# Patient Record
Sex: FEMALE | Race: BLACK OR AFRICAN AMERICAN | NOT HISPANIC OR LATINO | Employment: FULL TIME | URBAN - METROPOLITAN AREA
[De-identification: names, ages, dates, MRNs, and addresses within clinical notes are randomized per-mention and may not be internally consistent; named-entity substitution may affect disease eponyms.]

---

## 2017-01-03 ENCOUNTER — PATIENT MESSAGE (OUTPATIENT)
Dept: ENDOCRINOLOGY | Facility: CLINIC | Age: 52
End: 2017-01-03

## 2017-01-21 DIAGNOSIS — E03.9 HYPOTHYROIDISM: ICD-10-CM

## 2017-01-22 RX ORDER — LEVOTHYROXINE SODIUM 75 UG/1
TABLET ORAL
Qty: 30 TABLET | Refills: 11 | Status: SHIPPED | OUTPATIENT
Start: 2017-01-22 | End: 2017-01-23 | Stop reason: SDUPTHER

## 2017-01-23 DIAGNOSIS — E03.9 HYPOTHYROIDISM, UNSPECIFIED TYPE: ICD-10-CM

## 2017-01-23 RX ORDER — LEVOTHYROXINE SODIUM 75 UG/1
75 TABLET ORAL
Qty: 30 TABLET | Refills: 11 | Status: SHIPPED | OUTPATIENT
Start: 2017-01-23 | End: 2017-12-21 | Stop reason: SDUPTHER

## 2017-01-25 RX ORDER — POTASSIUM CHLORIDE 20 MEQ/1
TABLET, EXTENDED RELEASE ORAL
Qty: 30 TABLET | Refills: 5 | Status: SHIPPED | OUTPATIENT
Start: 2017-01-25 | End: 2017-07-20 | Stop reason: SDUPTHER

## 2017-01-31 DIAGNOSIS — I10 ESSENTIAL HYPERTENSION: ICD-10-CM

## 2017-01-31 DIAGNOSIS — E11.9 TYPE 2 DIABETES MELLITUS WITHOUT COMPLICATION: ICD-10-CM

## 2017-01-31 RX ORDER — TRIAMTERENE AND HYDROCHLOROTHIAZIDE 37.5; 25 MG/1; MG/1
1 CAPSULE ORAL DAILY
Qty: 30 CAPSULE | Refills: 5 | Status: SHIPPED | OUTPATIENT
Start: 2017-01-31 | End: 2017-07-20 | Stop reason: SDUPTHER

## 2017-02-01 RX ORDER — METFORMIN HYDROCHLORIDE 750 MG/1
TABLET, EXTENDED RELEASE ORAL
Qty: 90 TABLET | Refills: 0 | OUTPATIENT
Start: 2017-02-01

## 2017-02-01 RX ORDER — VERAPAMIL HYDROCHLORIDE 240 MG/1
TABLET, FILM COATED, EXTENDED RELEASE ORAL
Qty: 90 TABLET | Refills: 0 | OUTPATIENT
Start: 2017-02-01

## 2017-04-13 ENCOUNTER — OFFICE VISIT (OUTPATIENT)
Dept: OBSTETRICS AND GYNECOLOGY | Facility: CLINIC | Age: 52
End: 2017-04-13
Payer: COMMERCIAL

## 2017-04-13 VITALS
BODY MASS INDEX: 43 KG/M2 | HEIGHT: 61 IN | SYSTOLIC BLOOD PRESSURE: 106 MMHG | DIASTOLIC BLOOD PRESSURE: 70 MMHG | WEIGHT: 227.75 LBS

## 2017-04-13 DIAGNOSIS — Z01.419 ROUTINE GYNECOLOGICAL EXAMINATION: Primary | ICD-10-CM

## 2017-04-13 DIAGNOSIS — Z12.31 SCREENING MAMMOGRAM, ENCOUNTER FOR: ICD-10-CM

## 2017-04-13 DIAGNOSIS — Z12.4 SCREENING FOR MALIGNANT NEOPLASM OF THE CERVIX: ICD-10-CM

## 2017-04-13 PROCEDURE — 88175 CYTOPATH C/V AUTO FLUID REDO: CPT

## 2017-04-13 PROCEDURE — 99396 PREV VISIT EST AGE 40-64: CPT | Mod: S$GLB,,, | Performed by: OBSTETRICS & GYNECOLOGY

## 2017-04-13 PROCEDURE — 3078F DIAST BP <80 MM HG: CPT | Mod: S$GLB,,, | Performed by: OBSTETRICS & GYNECOLOGY

## 2017-04-13 PROCEDURE — 3074F SYST BP LT 130 MM HG: CPT | Mod: S$GLB,,, | Performed by: OBSTETRICS & GYNECOLOGY

## 2017-04-13 PROCEDURE — 99999 PR PBB SHADOW E&M-EST. PATIENT-LVL III: CPT | Mod: PBBFAC,,, | Performed by: OBSTETRICS & GYNECOLOGY

## 2017-04-13 NOTE — PROGRESS NOTES
"OBSTETRIC HISTORY:   OB History      Para Term  AB TAB SAB Ectopic Multiple Living    3 3        3         COMPREHENSIVE GYN HISTORY:  PAP History: Denies abnormal Paps.  Infection History: Denies STDs. Denies PID.  Benign History: Denies uterine fibroids. Denies ovarian cysts. Denies endometriosis.   Cancer History: Denies cervical cancer. Denies uterine cancer or hyperplasia. Denies ovarian cancer. Denies vulvar cancer or pre-cancer. Denies vaginal cancer or pre-cancer. Denies breast cancer. Denies colon cancer.  Sexual Activity History:   reports that she currently engages in sexual activity. She reports using the following method of birth control/protection: Surgical.   Menstrual History: Every 30 days, flows for 5 days. Heavy (changes pad and tampon every 1-2 hours)  flow.  Dysmenorrhea History: Denies dysmenorrhea.      HPI:   51 y.o.  Patient's last menstrual period was 2014.   Patient is  here for her annual gynecologic exam.  She has complaints of nipple itching and bilateral sharp breast pain that comes and goes. Requesting something more for vaginal dryness (OTC vaginal moisturizers not helping). She denies bladder and bowel complaints.    ROS:  GENERAL: Denies weight gain or weight loss. Feeling well overall.   SKIN: Denies rash or lesions.   HEAD: Denies headache.   NODES: Denies enlarged lymph nodes.   CHEST: Denies shortness of breath.   ABDOMEN: No abdominal pain, constipation, diarrhea, nausea, vomiting or rectal bleeding.   URINARY: No frequency, dysuria, hematuria, or burning on urination.  REPRODUCTIVE: See HPI.   BREASTS: The patient denies lumps, or nipple discharge.   HEMATOLOGIC: No easy bruisability.   MUSCULOSKELETAL: Denies joint pain or back pain.   NEUROLOGIC: Denies weakness.   PSYCHIATRIC: Denies depression, anxiety or mood swings.    PE:   /70  Ht 5' 1" (1.549 m)  Wt 103.3 kg (227 lb 11.8 oz)  LMP 2014  BMI 43.03 kg/m2  APPEARANCE: Well nourished, " well developed, in no acute distress.  NECK: Neck symmetric without  thyromegaly.  NODES: No inguinal, cervical lymph node enlargement.  CHEST: Lungs clear to auscultation.  HEART: Regular rate and rhythm, no murmurs, rubs or gallops.  ABDOMEN: Soft. No tenderness or masses. No hernias.  BREASTS: Symmetrical, no skin changes or visible lesions other than well healed scars. No palpable masses but breasts are very dense and difficult to examine. No nipple discharge or adenopathy bilaterally.  PELVIC:   VULVA: No lesions. Normal female genitalia.  URETHRAL MEATUS: Normal size and location, no lesions, no prolapse.  URETHRA: No masses, tenderness, prolapse or scarring.  VAGINA: Moist and well rugated, no discharge, no significant cystocele or rectocele.  CERVIX: No lesions and discharge.  UTERUS: Normal size, regular shape, mobile, non-tender, bladder base nontender.  ADNEXA: No masses or tenderness.    PROCEDURES:  Pap smear    Assessment:  Normal Gynecologic Exam  Breast pain  Nipple itching  Atrophy/dyspareunia-- Premarin Cream    Plan:  Mammogram and Colonoscopy if indicated by current recommendations.  Return to clinic in one year or for any problems or complaints.    Counseling:  Patient was counseled today on A.C.S. Pap guidelines and recommendations for yearly pelvic exams and monthly self breast exams; to see her PCP for other health maintenance. Regular exercise and healthy diet.

## 2017-05-15 ENCOUNTER — TELEPHONE (OUTPATIENT)
Dept: INTERNAL MEDICINE | Facility: CLINIC | Age: 52
End: 2017-05-15

## 2017-05-15 DIAGNOSIS — Z00.00 ROUTINE MEDICAL EXAM: Primary | ICD-10-CM

## 2017-05-15 DIAGNOSIS — E11.9 TYPE 2 DIABETES MELLITUS WITHOUT COMPLICATION, WITHOUT LONG-TERM CURRENT USE OF INSULIN: ICD-10-CM

## 2017-05-15 NOTE — TELEPHONE ENCOUNTER
----- Message from Ange Melvin sent at 5/15/2017 10:02 AM CDT -----  Doctor appointment and lab have been scheduled.  Please link lab orders to the lab appointment.  Date of doctor appointment:  7/20  Physical or EP:  epp  Date of lab appointment:  7/13  Comments:

## 2017-06-15 DIAGNOSIS — I10 ESSENTIAL HYPERTENSION: ICD-10-CM

## 2017-06-15 DIAGNOSIS — E11.9 TYPE 2 DIABETES MELLITUS WITHOUT COMPLICATION: ICD-10-CM

## 2017-06-15 RX ORDER — METFORMIN HYDROCHLORIDE 750 MG/1
TABLET, EXTENDED RELEASE ORAL
Qty: 30 TABLET | Refills: 1 | Status: SHIPPED | OUTPATIENT
Start: 2017-06-15 | End: 2017-12-21

## 2017-06-15 RX ORDER — VERAPAMIL HYDROCHLORIDE 240 MG/1
TABLET, FILM COATED, EXTENDED RELEASE ORAL
Qty: 30 TABLET | Refills: 1 | Status: SHIPPED | OUTPATIENT
Start: 2017-06-15 | End: 2017-09-18 | Stop reason: SDUPTHER

## 2017-06-28 DIAGNOSIS — Z12.11 COLON CANCER SCREENING: ICD-10-CM

## 2017-07-13 ENCOUNTER — LAB VISIT (OUTPATIENT)
Dept: LAB | Facility: HOSPITAL | Age: 52
End: 2017-07-13
Payer: COMMERCIAL

## 2017-07-13 DIAGNOSIS — E11.9 TYPE 2 DIABETES MELLITUS WITHOUT COMPLICATION, WITHOUT LONG-TERM CURRENT USE OF INSULIN: ICD-10-CM

## 2017-07-13 DIAGNOSIS — Z00.00 ROUTINE MEDICAL EXAM: ICD-10-CM

## 2017-07-13 LAB
25(OH)D3+25(OH)D2 SERPL-MCNC: 62 NG/ML
ALBUMIN SERPL BCP-MCNC: 3.4 G/DL
ALP SERPL-CCNC: 78 U/L
ALT SERPL W/O P-5'-P-CCNC: 11 U/L
ANION GAP SERPL CALC-SCNC: 8 MMOL/L
AST SERPL-CCNC: 20 U/L
BASOPHILS # BLD AUTO: 0.04 K/UL
BASOPHILS NFR BLD: 0.5 %
BILIRUB SERPL-MCNC: 0.3 MG/DL
BUN SERPL-MCNC: 15 MG/DL
CALCIUM SERPL-MCNC: 9.3 MG/DL
CHLORIDE SERPL-SCNC: 108 MMOL/L
CHOLEST/HDLC SERPL: 5.6 {RATIO}
CO2 SERPL-SCNC: 28 MMOL/L
CREAT SERPL-MCNC: 0.9 MG/DL
DIFFERENTIAL METHOD: ABNORMAL
EOSINOPHIL # BLD AUTO: 0.2 K/UL
EOSINOPHIL NFR BLD: 2.9 %
ERYTHROCYTE [DISTWIDTH] IN BLOOD BY AUTOMATED COUNT: 13.6 %
EST. GFR  (AFRICAN AMERICAN): >60 ML/MIN/1.73 M^2
EST. GFR  (NON AFRICAN AMERICAN): >60 ML/MIN/1.73 M^2
ESTIMATED AVG GLUCOSE: 114 MG/DL
GLUCOSE SERPL-MCNC: 93 MG/DL
HBA1C MFR BLD HPLC: 5.6 %
HCT VFR BLD AUTO: 41.2 %
HDL/CHOLESTEROL RATIO: 17.8 %
HDLC SERPL-MCNC: 185 MG/DL
HDLC SERPL-MCNC: 33 MG/DL
HGB BLD-MCNC: 13.9 G/DL
LDLC SERPL CALC-MCNC: 124.2 MG/DL
LYMPHOCYTES # BLD AUTO: 4.8 K/UL
LYMPHOCYTES NFR BLD: 64.7 %
MCH RBC QN AUTO: 29.9 PG
MCHC RBC AUTO-ENTMCNC: 33.7 %
MCV RBC AUTO: 89 FL
MONOCYTES # BLD AUTO: 0.4 K/UL
MONOCYTES NFR BLD: 4.7 %
NEUTROPHILS # BLD AUTO: 2 K/UL
NEUTROPHILS NFR BLD: 27.1 %
NONHDLC SERPL-MCNC: 152 MG/DL
PLATELET # BLD AUTO: 265 K/UL
PMV BLD AUTO: 10.1 FL
POTASSIUM SERPL-SCNC: 3.6 MMOL/L
PROT SERPL-MCNC: 6.6 G/DL
RBC # BLD AUTO: 4.65 M/UL
SODIUM SERPL-SCNC: 144 MMOL/L
TRIGL SERPL-MCNC: 139 MG/DL
WBC # BLD AUTO: 7.47 K/UL

## 2017-07-13 PROCEDURE — 85025 COMPLETE CBC W/AUTO DIFF WBC: CPT

## 2017-07-13 PROCEDURE — 83036 HEMOGLOBIN GLYCOSYLATED A1C: CPT

## 2017-07-13 PROCEDURE — 36415 COLL VENOUS BLD VENIPUNCTURE: CPT

## 2017-07-13 PROCEDURE — 80053 COMPREHEN METABOLIC PANEL: CPT

## 2017-07-13 PROCEDURE — 82306 VITAMIN D 25 HYDROXY: CPT

## 2017-07-13 PROCEDURE — 80061 LIPID PANEL: CPT

## 2017-07-20 ENCOUNTER — OFFICE VISIT (OUTPATIENT)
Dept: INTERNAL MEDICINE | Facility: CLINIC | Age: 52
End: 2017-07-20
Payer: COMMERCIAL

## 2017-07-20 VITALS
HEART RATE: 61 BPM | HEIGHT: 61 IN | WEIGHT: 227.13 LBS | BODY MASS INDEX: 42.88 KG/M2 | SYSTOLIC BLOOD PRESSURE: 118 MMHG | DIASTOLIC BLOOD PRESSURE: 82 MMHG

## 2017-07-20 DIAGNOSIS — E11.9 TYPE 2 DIABETES MELLITUS WITHOUT COMPLICATION, WITHOUT LONG-TERM CURRENT USE OF INSULIN: ICD-10-CM

## 2017-07-20 DIAGNOSIS — Z00.00 ROUTINE MEDICAL EXAM: Primary | ICD-10-CM

## 2017-07-20 DIAGNOSIS — E11.69 DYSLIPIDEMIA ASSOCIATED WITH TYPE 2 DIABETES MELLITUS: ICD-10-CM

## 2017-07-20 DIAGNOSIS — E66.01 MORBID OBESITY WITH BMI OF 40.0-44.9, ADULT: ICD-10-CM

## 2017-07-20 DIAGNOSIS — Z12.11 COLON CANCER SCREENING: ICD-10-CM

## 2017-07-20 DIAGNOSIS — E78.5 DYSLIPIDEMIA ASSOCIATED WITH TYPE 2 DIABETES MELLITUS: ICD-10-CM

## 2017-07-20 DIAGNOSIS — I10 ESSENTIAL HYPERTENSION: ICD-10-CM

## 2017-07-20 DIAGNOSIS — Z12.31 ENCOUNTER FOR SCREENING MAMMOGRAM FOR BREAST CANCER: ICD-10-CM

## 2017-07-20 LAB
BILIRUB UR QL STRIP: NEGATIVE
CLARITY UR REFRACT.AUTO: CLEAR
COLOR UR AUTO: YELLOW
CREAT UR-MCNC: 97 MG/DL
GLUCOSE UR QL STRIP: NEGATIVE
HGB UR QL STRIP: NEGATIVE
KETONES UR QL STRIP: NEGATIVE
LEUKOCYTE ESTERASE UR QL STRIP: NEGATIVE
MICROALBUMIN UR DL<=1MG/L-MCNC: <2.5 UG/ML
MICROALBUMIN/CREATININE RATIO: NORMAL UG/MG
NITRITE UR QL STRIP: NEGATIVE
PH UR STRIP: 6 [PH] (ref 5–8)
PROT UR QL STRIP: NEGATIVE
SP GR UR STRIP: 1.01 (ref 1–1.03)
URN SPEC COLLECT METH UR: NORMAL
UROBILINOGEN UR STRIP-ACNC: NEGATIVE EU/DL

## 2017-07-20 PROCEDURE — 99999 PR PBB SHADOW E&M-EST. PATIENT-LVL III: CPT | Mod: PBBFAC,,, | Performed by: INTERNAL MEDICINE

## 2017-07-20 PROCEDURE — 99396 PREV VISIT EST AGE 40-64: CPT | Mod: S$GLB,,, | Performed by: INTERNAL MEDICINE

## 2017-07-20 PROCEDURE — 82570 ASSAY OF URINE CREATININE: CPT

## 2017-07-20 PROCEDURE — 81003 URINALYSIS AUTO W/O SCOPE: CPT

## 2017-07-20 RX ORDER — TRIAMTERENE AND HYDROCHLOROTHIAZIDE 37.5; 25 MG/1; MG/1
1 CAPSULE ORAL DAILY
Qty: 30 CAPSULE | Refills: 5 | Status: SHIPPED | OUTPATIENT
Start: 2017-07-20 | End: 2018-02-04 | Stop reason: SDUPTHER

## 2017-07-20 RX ORDER — POTASSIUM CHLORIDE 20 MEQ/1
TABLET, EXTENDED RELEASE ORAL
Qty: 30 TABLET | Refills: 5 | Status: SHIPPED | OUTPATIENT
Start: 2017-07-20 | End: 2017-12-21

## 2017-07-20 NOTE — PROGRESS NOTES
Subjective:       Patient ID: Matt Lomax is a 51 y.o. female.    Chief Complaint: Annual Exam    Last seen 10 months ago - Hypertension and Diabetes were well controlled, but LDL Cholesterol was above goal at 124 - started Pravastatin 10mg. Returns for annual exam and f/u chronic conditions non-compliant with Pravastatin - never took it. In fact, she is hoping to get off her diabetes and blood pressure meds too. Has changed her diet and started exercising a few weeks ago, weight down about 6 pounds from last Fall. Has home BP and glucose monitors but not using them. I advised regular monitoring is crucial if she plans on stopping medication, and she agrees to do this. No new complaints.     Past medical history:     Hypertension.  Pituitary adenoma, with secondary hypothyroidism and secondary adrenal insufficiency.    Morbid Obesity..  Dyslipidemia, TChol 198, , HDL 32,  Sep. '16.   Diabetes Type 2, HbA1c 5.8% Sep. '16.   JUAN on CPAP.   Vitamin D insufficiency    Mammogram normal 3/16 - ordered this year, not done yet. Pelvic exam . BMD ordered, not done. Eye exam 10/16. Podiatry 10/16. Tdap . Flu shot and Pneumovax .       Negative treadmill stress test in 2008. Echocardiogram 2009 showed normal LV function with EF 60%, diastolic dysfunction and mild pulmonary hypertension.     Past Surgical History:  x 1. Transphenoidal Resection of Pituitary Adenoma .    Social History: Nonsmoker, no alcohol consumption,  with 3 children. Drives a school bus.    Family medical history: Mother  age 46 with an MI, and was a diabetic. No family history of cancer.    Allergies: ACE inhibitors - angioedema. Amlodipine - swelling. Clonidine - sedation.    Medications: list reviewed and reconciled.                 Review of Systems   Constitutional: Negative for activity change, appetite change, fatigue, fever and unexpected weight change.   HENT: Negative for  "congestion, hearing loss, rhinorrhea, sneezing, sore throat, trouble swallowing and voice change.    Eyes: Negative for pain, redness and visual disturbance.   Respiratory: Negative for cough, chest tightness, shortness of breath and wheezing.    Cardiovascular: Negative for chest pain, palpitations and leg swelling.   Gastrointestinal: Negative for abdominal pain, blood in stool, constipation, diarrhea, nausea and vomiting.   Genitourinary: Negative for difficulty urinating, dysuria, flank pain, frequency, hematuria, menstrual problem and urgency.   Musculoskeletal: Negative for arthralgias, back pain, joint swelling, myalgias and neck pain.   Skin: Negative for color change and rash.   Neurological: Negative for dizziness, syncope, facial asymmetry, speech difficulty, weakness, numbness and headaches.   Hematological: Negative for adenopathy. Does not bruise/bleed easily.   Psychiatric/Behavioral: Negative for decreased concentration, dysphoric mood and sleep disturbance. The patient is not nervous/anxious.        Objective:    /82, Pulse 61, Ht 5' 1", Wt 227 lbs, BMI=43  Physical Exam   Constitutional: She is oriented to person, place, and time. She appears well-developed and well-nourished. No distress.   HENT:   Head: Normocephalic and atraumatic.   Right Ear: External ear normal.   Left Ear: External ear normal.   Nose: Nose normal.   Mouth/Throat: Oropharynx is clear and moist. No oropharyngeal exudate.   Eyes: Conjunctivae and EOM are normal. Pupils are equal, round, and reactive to light. Right conjunctiva is not injected. Left conjunctiva is not injected. No scleral icterus.   Neck: Normal range of motion. Neck supple. No JVD present. Carotid bruit is not present. No thyromegaly present.   Cardiovascular: Normal rate, regular rhythm, normal heart sounds and intact distal pulses.  Exam reveals no gallop and no friction rub.    No murmur heard.  Pulmonary/Chest: Effort normal and breath sounds normal. " No respiratory distress. She has no wheezes. She has no rhonchi. She has no rales.   Abdominal: Soft. Bowel sounds are normal. She exhibits no distension and no mass. There is no hepatosplenomegaly. There is no tenderness. There is no CVA tenderness.   Musculoskeletal: Normal range of motion. She exhibits no edema, tenderness or deformity.   Lymphadenopathy:     She has no cervical adenopathy.   Neurological: She is alert and oriented to person, place, and time. She has normal strength. No cranial nerve deficit. She exhibits normal muscle tone. Coordination and gait normal.   Skin: Skin is warm and dry. No lesion and no rash noted. She is not diaphoretic. No cyanosis or erythema. No pallor. Nails show no clubbing.   Psychiatric: She has a normal mood and affect. Her behavior is normal. Judgment and thought content normal.   Vitals reviewed.      7/13/17: CBC normal, CMP normal, HbA1c 5.6%, Vit D 62, TChol 185, , HDL 33, .     Assessment:       1. Routine medical exam        Plan:       Routine medical exam  -     Urinalysis    Type 2 diabetes mellitus without complication, without long-term current use of insulin  -     Microalbumin/creatinine urine ratio  -     May hold Metformin, monitor fasting and evening glucose - parameters given to resume treatment.    Essential hypertension well controlled        -     May hold Verapamil, begin home monitoring - parameters given to resume treatment.   -     triamterene-hydrochlorothiazide 37.5-25 mg (DYAZIDE) 37.5-25 mg per capsule; Take 1 capsule by mouth once daily.  Dispense: 30 capsule; Refill: 5  -     potassium chloride SA (K-DUR,KLOR-CON) 20 MEQ tablet; TAKE 1 TABLET (20 MEQ TOTAL) BY MOUTH ONCE DAILY.  Dispense: 30 tablet; Refill: 5    Dyslipidemia associated with type 2 diabetes mellitus - counseled on diet and exercise, she declines medication.    Morbid obesity with BMI of 40.0-44.9, adult - continue diet and exercise.     Encounter for screening  mammogram for breast cancer - MMG ordered by Gyn is scheduled.    Colon cancer screening  -     Case request GI: COLONOSCOPY    Eye exam in October. Flu shot when available.

## 2017-07-28 ENCOUNTER — HOSPITAL ENCOUNTER (OUTPATIENT)
Dept: RADIOLOGY | Facility: HOSPITAL | Age: 52
Discharge: HOME | End: 2017-07-28
Attending: OBSTETRICS & GYNECOLOGY
Payer: COMMERCIAL

## 2017-07-28 DIAGNOSIS — Z12.31 SCREENING MAMMOGRAM, ENCOUNTER FOR: ICD-10-CM

## 2017-07-28 PROCEDURE — 77067 SCR MAMMO BI INCL CAD: CPT | Mod: 26,,, | Performed by: RADIOLOGY

## 2017-07-28 PROCEDURE — 77067 SCR MAMMO BI INCL CAD: CPT | Mod: TC

## 2017-07-28 PROCEDURE — 77063 BREAST TOMOSYNTHESIS BI: CPT | Mod: 26,,, | Performed by: RADIOLOGY

## 2017-07-31 DIAGNOSIS — Z12.11 SPECIAL SCREENING FOR MALIGNANT NEOPLASMS, COLON: Primary | ICD-10-CM

## 2017-07-31 RX ORDER — POLYETHYLENE GLYCOL 3350, SODIUM SULFATE ANHYDROUS, SODIUM BICARBONATE, SODIUM CHLORIDE, POTASSIUM CHLORIDE 236; 22.74; 6.74; 5.86; 2.97 G/4L; G/4L; G/4L; G/4L; G/4L
4 POWDER, FOR SOLUTION ORAL ONCE
Qty: 4000 ML | Refills: 0 | Status: SHIPPED | OUTPATIENT
Start: 2017-07-31 | End: 2017-07-31

## 2017-08-07 ENCOUNTER — PATIENT MESSAGE (OUTPATIENT)
Dept: INTERNAL MEDICINE | Facility: CLINIC | Age: 52
End: 2017-08-07

## 2017-08-07 ENCOUNTER — PATIENT MESSAGE (OUTPATIENT)
Dept: ENDOCRINOLOGY | Facility: CLINIC | Age: 52
End: 2017-08-07

## 2017-08-07 DIAGNOSIS — D35.2 PITUITARY MACROADENOMA: ICD-10-CM

## 2017-08-07 DIAGNOSIS — E03.8 SECONDARY HYPOTHYROIDISM: Primary | ICD-10-CM

## 2017-08-07 DIAGNOSIS — E11.9 TYPE 2 DIABETES MELLITUS WITHOUT COMPLICATION, WITHOUT LONG-TERM CURRENT USE OF INSULIN: Primary | ICD-10-CM

## 2017-08-07 DIAGNOSIS — E11.9 TYPE 2 DIABETES MELLITUS WITHOUT COMPLICATION, WITHOUT LONG-TERM CURRENT USE OF INSULIN: ICD-10-CM

## 2017-08-07 RX ORDER — LANCETS
1 EACH MISCELLANEOUS DAILY
Qty: 100 EACH | Refills: 3 | Status: SHIPPED | OUTPATIENT
Start: 2017-08-07 | End: 2017-10-20

## 2017-08-08 ENCOUNTER — PATIENT MESSAGE (OUTPATIENT)
Dept: ENDOCRINOLOGY | Facility: CLINIC | Age: 52
End: 2017-08-08

## 2017-08-09 ENCOUNTER — ANESTHESIA (OUTPATIENT)
Dept: ENDOSCOPY | Facility: HOSPITAL | Age: 52
End: 2017-08-09
Payer: COMMERCIAL

## 2017-08-09 ENCOUNTER — SURGERY (OUTPATIENT)
Age: 52
End: 2017-08-09

## 2017-08-09 ENCOUNTER — ANESTHESIA EVENT (OUTPATIENT)
Dept: ENDOSCOPY | Facility: HOSPITAL | Age: 52
End: 2017-08-09
Payer: COMMERCIAL

## 2017-08-09 ENCOUNTER — HOSPITAL ENCOUNTER (OUTPATIENT)
Facility: HOSPITAL | Age: 52
Discharge: HOME | End: 2017-08-09
Attending: INTERNAL MEDICINE | Admitting: INTERNAL MEDICINE
Payer: COMMERCIAL

## 2017-08-09 VITALS
TEMPERATURE: 98 F | RESPIRATION RATE: 17 BRPM | WEIGHT: 224 LBS | HEART RATE: 67 BPM | BODY MASS INDEX: 42.29 KG/M2 | DIASTOLIC BLOOD PRESSURE: 77 MMHG | SYSTOLIC BLOOD PRESSURE: 119 MMHG | OXYGEN SATURATION: 97 % | HEIGHT: 61 IN

## 2017-08-09 VITALS — RESPIRATION RATE: 16 BRPM

## 2017-08-09 DIAGNOSIS — Z12.11 COLON CANCER SCREENING: Primary | ICD-10-CM

## 2017-08-09 DIAGNOSIS — Z12.11 SCREENING FOR COLON CANCER: ICD-10-CM

## 2017-08-09 LAB — POCT GLUCOSE: 91 MG/DL (ref 70–110)

## 2017-08-09 PROCEDURE — 45385 COLONOSCOPY W/LESION REMOVAL: CPT | Performed by: INTERNAL MEDICINE

## 2017-08-09 PROCEDURE — D9220A PRA ANESTHESIA: Mod: 33,ANES,, | Performed by: ANESTHESIOLOGY

## 2017-08-09 PROCEDURE — 63600175 PHARM REV CODE 636 W HCPCS: Performed by: NURSE ANESTHETIST, CERTIFIED REGISTERED

## 2017-08-09 PROCEDURE — 82962 GLUCOSE BLOOD TEST: CPT | Performed by: INTERNAL MEDICINE

## 2017-08-09 PROCEDURE — D9220A PRA ANESTHESIA: Mod: 33,CRNA,, | Performed by: NURSE ANESTHETIST, CERTIFIED REGISTERED

## 2017-08-09 PROCEDURE — 88305 TISSUE EXAM BY PATHOLOGIST: CPT | Performed by: PATHOLOGY

## 2017-08-09 PROCEDURE — 25000003 PHARM REV CODE 250: Performed by: INTERNAL MEDICINE

## 2017-08-09 PROCEDURE — 88305 TISSUE EXAM BY PATHOLOGIST: CPT | Mod: 26,,, | Performed by: PATHOLOGY

## 2017-08-09 PROCEDURE — 37000009 HC ANESTHESIA EA ADD 15 MINS: Performed by: ANESTHESIOLOGY

## 2017-08-09 PROCEDURE — 25000003 PHARM REV CODE 250: Performed by: NURSE ANESTHETIST, CERTIFIED REGISTERED

## 2017-08-09 PROCEDURE — 37000008 HC ANESTHESIA 1ST 15 MINUTES: Performed by: INTERNAL MEDICINE

## 2017-08-09 PROCEDURE — 27201089 HC SNARE, DISP (ANY): Performed by: INTERNAL MEDICINE

## 2017-08-09 PROCEDURE — 37000008 HC ANESTHESIA 1ST 15 MINUTES: Performed by: ANESTHESIOLOGY

## 2017-08-09 PROCEDURE — 37000009 HC ANESTHESIA EA ADD 15 MINS: Performed by: INTERNAL MEDICINE

## 2017-08-09 PROCEDURE — 45385 COLONOSCOPY W/LESION REMOVAL: CPT | Mod: 33,,, | Performed by: INTERNAL MEDICINE

## 2017-08-09 RX ORDER — SODIUM CHLORIDE 9 MG/ML
INJECTION, SOLUTION INTRAVENOUS CONTINUOUS
Status: DISCONTINUED | OUTPATIENT
Start: 2017-08-09 | End: 2017-08-09 | Stop reason: HOSPADM

## 2017-08-09 RX ORDER — GLYCOPYRROLATE 0.2 MG/ML
INJECTION INTRAMUSCULAR; INTRAVENOUS
Status: DISCONTINUED | OUTPATIENT
Start: 2017-08-09 | End: 2017-08-09

## 2017-08-09 RX ORDER — PROPOFOL 10 MG/ML
INJECTION, EMULSION INTRAVENOUS
Status: DISCONTINUED | OUTPATIENT
Start: 2017-08-09 | End: 2017-08-09

## 2017-08-09 RX ORDER — PROPOFOL 10 MG/ML
INJECTION, EMULSION INTRAVENOUS CONTINUOUS PRN
Status: DISCONTINUED | OUTPATIENT
Start: 2017-08-09 | End: 2017-08-09

## 2017-08-09 RX ORDER — LIDOCAINE HCL/PF 100 MG/5ML
SYRINGE (ML) INTRAVENOUS
Status: DISCONTINUED | OUTPATIENT
Start: 2017-08-09 | End: 2017-08-09

## 2017-08-09 RX ADMIN — SODIUM CHLORIDE: 0.9 INJECTION, SOLUTION INTRAVENOUS at 09:08

## 2017-08-09 RX ADMIN — PROPOFOL 200 MCG/KG/MIN: 10 INJECTION, EMULSION INTRAVENOUS at 10:08

## 2017-08-09 RX ADMIN — PROPOFOL 70 MG: 10 INJECTION, EMULSION INTRAVENOUS at 10:08

## 2017-08-09 RX ADMIN — LIDOCAINE HYDROCHLORIDE 100 MG: 20 INJECTION, SOLUTION INTRAVENOUS at 10:08

## 2017-08-09 RX ADMIN — GLYCOPYRROLATE 0.2 MG: 0.2 INJECTION, SOLUTION INTRAMUSCULAR; INTRAVENOUS at 10:08

## 2017-08-09 NOTE — ANESTHESIA POSTPROCEDURE EVALUATION
"Anesthesia Post Evaluation    Patient: Matt Lomax    Procedure(s) Performed: Procedure(s) (LRB):  COLONOSCOPY (N/A)    Final Anesthesia Type: general  Patient location during evaluation: GI PACU  Patient participation: Yes- Able to Participate  Level of consciousness: awake and alert, oriented and awake  Post-procedure vital signs: reviewed and stable  Pain management: adequate  Airway patency: patent  PONV status at discharge: No PONV  Anesthetic complications: no      Cardiovascular status: blood pressure returned to baseline and hemodynamically stable  Respiratory status: unassisted, spontaneous ventilation and room air  Hydration status: euvolemic  Follow-up not needed.        Visit Vitals  /77 (BP Location: Left arm, Patient Position: Sitting, BP Method: Automatic)   Pulse 67   Temp 36.6 °C (97.8 °F) (Oral)   Resp 17   Ht 5' 1" (1.549 m)   Wt 101.6 kg (224 lb)   LMP 07/21/2014   SpO2 97%   Breastfeeding? No   BMI 42.32 kg/m²       Pain/Tye Score: Pain Assessment Performed: Yes (8/9/2017 11:15 AM)  Presence of Pain: denies (8/9/2017 11:15 AM)  Pain Rating Prior to Med Admin: 0 (8/9/2017 11:15 AM)  Tye Score: 10 (8/9/2017 11:15 AM)      "

## 2017-08-09 NOTE — TRANSFER OF CARE
"Anesthesia Transfer of Care Note    Patient: Matt Lomax    Procedure(s) Performed: Procedure(s) (LRB):  COLONOSCOPY (N/A)    Patient location: GI    Anesthesia Type: general    Transport from OR: Transported from OR on room air with adequate spontaneous ventilation    Post pain: adequate analgesia    Post assessment: no apparent anesthetic complications and tolerated procedure well    Post vital signs: stable    Level of consciousness: awake, alert and oriented    Nausea/Vomiting: no nausea/vomiting    Complications: none    Transfer of care protocol was followed      Last vitals:   Visit Vitals  /84 (Patient Position: Lying, BP Method: Automatic)   Pulse 71   Temp 37.1 °C (98.8 °F) (Oral)   Resp 16   Ht 5' 1" (1.549 m)   Wt 101.6 kg (224 lb)   LMP 07/21/2014   SpO2 97%   Breastfeeding? No   BMI 42.32 kg/m²     "

## 2017-08-09 NOTE — PATIENT INSTRUCTIONS
Discharge Summary/Instructions after an Endoscopic Procedure  Patient Name: Matt Lomax  Patient MRN: [id number]  Patient YOB: 1965 Wednesday, August 09, 2017  Ciro Rebolledo MD  RESTRICTIONS ON ACTIVITY:  - DO NOT drive a car, operate machinery, make legal/financial decisions, or   drink alcohol until the day after the procedure.    - The following day: return to full activity including work, except no heavy   lifting, straining or running for 3 days if polyps were removed.  - Diet: Eat and drink normally unless instructed otherwise.  TREATMENT FOR COMMON SIDE EFFECTS:  - Mild abdominal pain, bloating or excessive gas: rest, eat lightly and use   a heating pad.  - Sore Throat - treat with throat lozenges. Gargle with warm salt water.  SYMPTOMS TO WATCH FOR AND REPORT TO YOUR PHYSICIAN:  1. Severe abdominal pain or bloating.  2. Pain in chest.  3. Chills or fever occurring within 24 hours after a procedure.  4. A large amount of rectal bleeding, which would show as bright red,   maroon, or black stools. (A small amount of blood from the rectum is not   serious, especially if hemorrhoids are present.)  5. Because air was used during the procedure, expelling large amounts of air   from your rectum or belching is normal.  6. If a bowel prep was taken, you may not have a bowel movement for 1-3   days.  This is normal.  7. Go directly to the emergency room if you notice any of the following:   Chills and/or fever over 101 F   Persistent vomiting   Severe abdominal pain, other than gas cramps   Severe chest pain   Black, tarry stools   Any bleeding - exceeding one tablespoon  Your doctor recommends these additional instructions:  If any biopsies were performed, my office will call you in 5 to 6 business   days with any results.  You have a contact number available for emergencies.  The signs and symptoms   of potential delayed complications were discussed with you.  You may return   to normal  activities tomorrow.  Written discharge instructions were   provided to you.   You are being discharged to home.   Resume your previous diet.   Continue your present medications.   We are waiting for your pathology results.   Your physician has recommended a repeat colonoscopy in five years for   surveillance.  For questions, problems or results please call your physician - Ciro Rebolledo MD at Work:  [phone].  [facility], EMERGENCY ROOM PHONE NUMBER: [phone]  IF A COMPLICATION OR EMERGENCY SITUATION ARISES AND YOU ARE UNABLE TO REACH   YOUR PHYSICIAN - GO TO THE EMERGENCY ROOM.  Ciro Rebolledo MD  8/9/2017 10:39:54 AM  This report has been verified and signed electronically.

## 2017-08-09 NOTE — INTERVAL H&P NOTE
The patient has been examined and the H&P has been reviewed:    There is no interval changes since last encounter.  Colonoscopy: Screening for CRC  Sedation: GA  ASA: per anesthesia  Mallampati: Per anesthesia    Endoscopy risks, benefits and alternative options discussed and understood by patient/family.          Active Hospital Problems    Diagnosis  POA    Screening for colon cancer [Z12.11]  Not Applicable      Resolved Hospital Problems    Diagnosis Date Resolved POA   No resolved problems to display.

## 2017-08-09 NOTE — ANESTHESIA PREPROCEDURE EVALUATION
08/09/2017  Matt Lomax is a 51 y.o., female.    Anesthesia Evaluation    I have reviewed the Patient Summary Reports.     I have reviewed the Medications.   Cortisone    Review of Systems  Anesthesia Hx:  No problems with previous Anesthesia Denies Hx of Anesthetic complications  History of prior surgery of interest to airway management or planning: Denies Family Hx of Anesthesia complications.   Denies Personal Hx of Anesthesia complications.   Cardiovascular:   Hypertension    Pulmonary:   Sleep Apnea    Renal/:  Renal/ Normal     Hepatic/GI:  Hepatic/GI Normal    Neurological:   Pituitary tumor resection 7/21/14   Endocrine:  Endocrine Normal        Physical Exam  General:  Well nourished, Morbid Obesity    Airway/Jaw/Neck:  Airway Findings: Mouth Opening: Normal Tongue: Normal  General Airway Assessment: Adult, Average  Mallampati: III  TM Distance: Normal, at least 6 cm      Dental:  Dental Findings: In tact   Chest/Lungs:  Chest/Lungs Findings: Clear to auscultation, Normal Respiratory Rate     Heart/Vascular:  Heart Findings: Rate: Normal  Rhythm: Regular Rhythm  Sounds: Normal        Mental Status:  Mental Status Findings:  Alert and Oriented, Cooperative, Flat Affect         Anesthesia Plan  Type of Anesthesia, risks & benefits discussed:  Anesthesia Type:  general  Patient's Preference: general  Intra-op Monitoring Plan: standard ASA monitors  Intra-op Monitoring Plan Comments:   Post Op Pain Control Plan:   Post Op Pain Control Plan Comments:   Induction:   IV  Beta Blocker:  Patient is not currently on a Beta-Blocker (No further documentation required).       Informed Consent: Patient understands risks and agrees with Anesthesia plan.  Questions answered. Anesthesia consent signed with patient.  ASA Score: 3     Day of Surgery Review of History & Physical:    H&P update referred to  the surgeon.         Ready For Surgery From Anesthesia Perspective.

## 2017-08-10 ENCOUNTER — TELEPHONE (OUTPATIENT)
Dept: GASTROENTEROLOGY | Facility: CLINIC | Age: 52
End: 2017-08-10

## 2017-08-13 ENCOUNTER — PATIENT MESSAGE (OUTPATIENT)
Dept: INTERNAL MEDICINE | Facility: CLINIC | Age: 52
End: 2017-08-13

## 2017-08-16 ENCOUNTER — TELEPHONE (OUTPATIENT)
Dept: ENDOSCOPY | Facility: HOSPITAL | Age: 52
End: 2017-08-16

## 2017-09-18 DIAGNOSIS — I10 ESSENTIAL HYPERTENSION: ICD-10-CM

## 2017-09-18 RX ORDER — VERAPAMIL HYDROCHLORIDE 240 MG/1
TABLET, FILM COATED, EXTENDED RELEASE ORAL
Qty: 30 TABLET | Refills: 5 | Status: SHIPPED | OUTPATIENT
Start: 2017-09-18 | End: 2018-03-18 | Stop reason: SDUPTHER

## 2017-10-20 ENCOUNTER — OFFICE VISIT (OUTPATIENT)
Dept: INTERNAL MEDICINE | Facility: CLINIC | Age: 52
End: 2017-10-20
Payer: COMMERCIAL

## 2017-10-20 ENCOUNTER — IMMUNIZATION (OUTPATIENT)
Dept: INTERNAL MEDICINE | Facility: CLINIC | Age: 52
End: 2017-10-20
Payer: COMMERCIAL

## 2017-10-20 VITALS
WEIGHT: 220.88 LBS | DIASTOLIC BLOOD PRESSURE: 82 MMHG | HEART RATE: 58 BPM | SYSTOLIC BLOOD PRESSURE: 116 MMHG | HEIGHT: 61 IN | BODY MASS INDEX: 41.7 KG/M2

## 2017-10-20 DIAGNOSIS — E78.5 DYSLIPIDEMIA ASSOCIATED WITH TYPE 2 DIABETES MELLITUS: ICD-10-CM

## 2017-10-20 DIAGNOSIS — M25.562 ACUTE PAIN OF LEFT KNEE: ICD-10-CM

## 2017-10-20 DIAGNOSIS — I10 ESSENTIAL HYPERTENSION: ICD-10-CM

## 2017-10-20 DIAGNOSIS — E11.69 DYSLIPIDEMIA ASSOCIATED WITH TYPE 2 DIABETES MELLITUS: ICD-10-CM

## 2017-10-20 DIAGNOSIS — E11.9 TYPE 2 DIABETES MELLITUS WITHOUT COMPLICATION, WITHOUT LONG-TERM CURRENT USE OF INSULIN: Primary | ICD-10-CM

## 2017-10-20 DIAGNOSIS — Z23 INFLUENZA VACCINE NEEDED: ICD-10-CM

## 2017-10-20 DIAGNOSIS — M25.521 RIGHT ELBOW PAIN: ICD-10-CM

## 2017-10-20 DIAGNOSIS — G47.00 INSOMNIA, UNSPECIFIED TYPE: ICD-10-CM

## 2017-10-20 PROCEDURE — 90686 IIV4 VACC NO PRSV 0.5 ML IM: CPT | Mod: S$GLB,,, | Performed by: INTERNAL MEDICINE

## 2017-10-20 PROCEDURE — 99214 OFFICE O/P EST MOD 30 MIN: CPT | Mod: S$GLB,,, | Performed by: INTERNAL MEDICINE

## 2017-10-20 PROCEDURE — 90471 IMMUNIZATION ADMIN: CPT | Mod: S$GLB,,, | Performed by: INTERNAL MEDICINE

## 2017-10-20 PROCEDURE — 99999 PR PBB SHADOW E&M-EST. PATIENT-LVL III: CPT | Mod: PBBFAC,,, | Performed by: INTERNAL MEDICINE

## 2017-10-20 RX ORDER — DIPHENHYDRAMINE HCL 25 MG
TABLET ORAL
COMMUNITY
Start: 2017-08-08 | End: 2019-03-08

## 2017-10-20 RX ORDER — ZOLPIDEM TARTRATE 5 MG/1
5 TABLET ORAL NIGHTLY PRN
Qty: 20 TABLET | Refills: 0 | Status: SHIPPED | OUTPATIENT
Start: 2017-10-20 | End: 2018-09-14 | Stop reason: SDUPTHER

## 2017-10-20 RX ORDER — GLUCOSAM/CHON-MSM1/C/MANG/BOSW 500-416.6
TABLET ORAL
COMMUNITY
Start: 2017-08-07 | End: 2019-03-08

## 2017-10-20 NOTE — PROGRESS NOTES
Subjective:       Patient ID: Matt Lomax is a 51 y.o. female.    Chief Complaint: Hypertension    Last seen 3 months ago for annual exam. She had started a new diet and exercise regimen and was hoping to reduce medication. Never took the statin ordered for mild dyslipidemia. And wanted to stop Metformin. Returns with glucose readings ranging mostly , occasionally up to 124 on diet and exercise alone. Goes to the gym 3-4 days per week doing a variety of exercises with a . Has lost some weight. Currently c/o acute right elbow pain since yesterday and acute left knee pain for a few days, no trauma in either instance. Does not recall any incident at the gym, but her exercise may certainly be contributing. No swelling of either joint. Gets relief with a topical analgesic and Ibuprofen 200mg three tabs BID (I advised she reduce this to 400mg). Also acute onset insomnia which includes both difficulty falling asleep and staying asleep, not due to any physical discomfort or emotional stress. No relief with Melatonin or ZZQuil.     Past medical history:     Hypertension.  Pituitary adenoma, with secondary hypothyroidism and secondary adrenal insufficiency, Endo following.    Morbid Obesity..  Dyslipidemia.  Diabetes Type 2, HbA1c 5.6% .   JUAN on CPAP.   Vitamin D insufficiency    Mammogram normal . Pelvic exam . BMD ordered, not done. Colonoscopy  one 3mm hyperplastic polyp, repeat in 5 yrs. Eye exam 10/16. Podiatry 10/16. Tdap . Flu shot and Pneumovax .  Labs : CBC and CMP normal, Vit D 62, TChol 185, , HDL 33, , Urinalysis clear, Urine Microalbumin normal.      Negative treadmill stress test in 2008. Echocardiogram 2009 showed normal LV function with EF 60%, diastolic dysfunction and mild pulmonary hypertension.     Past Surgical History:  x 1. Transphenoidal Resection of Pituitary Adenoma .    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    Family medical history: Mother  age 46 with an MI, and was a diabetic. No family history of cancer.    Allergies: ACE inhibitors - angioedema. Amlodipine - swelling. Clonidine - sedation.    Medications: list reviewed and reconciled. Off Metformin x 3 months.                 Review of Systems   Constitutional: Negative for fatigue and fever.   Respiratory: Negative for cough and shortness of breath.    Cardiovascular: Negative for chest pain, palpitations and leg swelling.   Gastrointestinal: Negative for abdominal pain, nausea and vomiting.   Endocrine: Negative for polydipsia, polyphagia and polyuria.   Skin: Negative for color change and rash.   Neurological: Negative for dizziness, tremors, seizures, speech difficulty, weakness and headaches.   Psychiatric/Behavioral: Negative for agitation, confusion and dysphoric mood. The patient is not nervous/anxious.        Objective:    /82, Pulse 58, Wt 221 lbs (from 227)  Physical Exam   Constitutional: She is oriented to person, place, and time. She appears well-developed and well-nourished. No distress.   HENT:   Nose: Nose normal.   Mouth/Throat: Oropharynx is clear and moist.   Eyes: Conjunctivae are normal. No scleral icterus.   Neck: Normal range of motion. Neck supple. No JVD present.   Cardiovascular: Normal rate, regular rhythm and normal heart sounds.    Pulmonary/Chest: Effort normal and breath sounds normal. No respiratory distress. She has no wheezes. She has no rales.   Musculoskeletal: Normal range of motion. She exhibits no edema, tenderness or deformity.   Right elbow and left knee exams are normal, no swelling/effusion, erythema, warmth, tenderness or pain on range of motion .   Neurological: She is alert and oriented to person, place, and time.   Skin: Skin is warm and dry. She is not diaphoretic.       Assessment:       1. Type 2 diabetes mellitus without complication,  without long-term current use of insulin    2. Essential hypertension    3. Dyslipidemia associated with type 2 diabetes mellitus    4. Insomnia, unspecified type    5. Right elbow pain    6. Acute pain of left knee    7. Influenza vaccine needed        Plan:       Type 2 diabetes mellitus without complication, without long-term current use of insulin - stable off med, HbA1c as scheduled for Endo next month.     Essential hypertension well controlled, continue same.    Dyslipidemia associated with type 2 diabetes mellitus - diet and exercise.    Insomnia, unspecified type  -     zolpidem (AMBIEN) 5 MG Tab; Take 1 tablet (5 mg total) by mouth nightly as needed.  Dispense: 20 tablet; Refill: 0 - short term use only.    Right elbow pain  Acute pain of left knee        -     Minor soft tissue sprain suspected, continue NSAIDS, topical analgesics, ice. Note activities that aggravate it.     Influenza vaccine needed        -     Flu shot today.

## 2017-10-20 NOTE — PROGRESS NOTES
Flu vaccine ordered per Dr Hardy . Two patient identifiers addressed, allergies reviewed, and vaccine verified. Flu vaccine administered to left deltoid. Patient  tolerated injection well; no swelling, redness, or bruising noted at injection site. Patient advised to remain in clinic 15 minutes following injection for observation,verbalizes understanding.

## 2017-11-27 ENCOUNTER — LAB VISIT (OUTPATIENT)
Dept: LAB | Facility: HOSPITAL | Age: 52
End: 2017-11-27
Attending: INTERNAL MEDICINE
Payer: COMMERCIAL

## 2017-11-27 DIAGNOSIS — E03.8 SECONDARY HYPOTHYROIDISM: ICD-10-CM

## 2017-11-27 DIAGNOSIS — D35.2 PITUITARY MACROADENOMA: ICD-10-CM

## 2017-11-27 DIAGNOSIS — E11.9 TYPE 2 DIABETES MELLITUS WITHOUT COMPLICATION, WITHOUT LONG-TERM CURRENT USE OF INSULIN: ICD-10-CM

## 2017-11-27 LAB
ESTIMATED AVG GLUCOSE: 108 MG/DL
HBA1C MFR BLD HPLC: 5.4 %
PROLACTIN SERPL IA-MCNC: 24.3 NG/ML
T4 FREE SERPL-MCNC: 1.12 NG/DL

## 2017-11-27 PROCEDURE — 84439 ASSAY OF FREE THYROXINE: CPT

## 2017-11-27 PROCEDURE — 83036 HEMOGLOBIN GLYCOSYLATED A1C: CPT

## 2017-11-27 PROCEDURE — 84146 ASSAY OF PROLACTIN: CPT

## 2017-11-27 PROCEDURE — 36415 COLL VENOUS BLD VENIPUNCTURE: CPT

## 2017-12-21 ENCOUNTER — OFFICE VISIT (OUTPATIENT)
Dept: ENDOCRINOLOGY | Facility: CLINIC | Age: 52
End: 2017-12-21
Payer: COMMERCIAL

## 2017-12-21 VITALS
HEIGHT: 61 IN | BODY MASS INDEX: 41.78 KG/M2 | DIASTOLIC BLOOD PRESSURE: 70 MMHG | HEART RATE: 78 BPM | WEIGHT: 221.31 LBS | SYSTOLIC BLOOD PRESSURE: 116 MMHG

## 2017-12-21 DIAGNOSIS — E66.01 OBESITY, MORBID, BMI 40.0-49.9: ICD-10-CM

## 2017-12-21 DIAGNOSIS — E27.49 SECONDARY ADRENAL INSUFFICIENCY: ICD-10-CM

## 2017-12-21 DIAGNOSIS — E03.9 HYPOTHYROIDISM, UNSPECIFIED TYPE: ICD-10-CM

## 2017-12-21 DIAGNOSIS — D35.2 PITUITARY MACROADENOMA: Primary | ICD-10-CM

## 2017-12-21 DIAGNOSIS — E22.1 HYPERPROLACTINEMIA: ICD-10-CM

## 2017-12-21 DIAGNOSIS — R73.02 IMPAIRED GLUCOSE TOLERANCE: ICD-10-CM

## 2017-12-21 DIAGNOSIS — E03.8 SECONDARY HYPOTHYROIDISM: ICD-10-CM

## 2017-12-21 PROCEDURE — 99999 PR PBB SHADOW E&M-EST. PATIENT-LVL IV: CPT | Mod: PBBFAC,,, | Performed by: INTERNAL MEDICINE

## 2017-12-21 PROCEDURE — 99214 OFFICE O/P EST MOD 30 MIN: CPT | Mod: S$GLB,,, | Performed by: INTERNAL MEDICINE

## 2017-12-21 RX ORDER — LEVOTHYROXINE SODIUM 75 UG/1
75 TABLET ORAL
Qty: 30 TABLET | Refills: 11 | Status: SHIPPED | OUTPATIENT
Start: 2017-12-21 | End: 2019-01-24 | Stop reason: SDUPTHER

## 2017-12-21 RX ORDER — HYDROCORTISONE 10 MG/1
TABLET ORAL
Qty: 45 TABLET | Refills: 11 | Status: SHIPPED | OUTPATIENT
Start: 2017-12-21 | End: 2019-01-10 | Stop reason: SDUPTHER

## 2017-12-21 NOTE — PROGRESS NOTES
"Subjective:      Patient ID: Matt Lomax is a 52 y.o. female.    Chief Complaint: Secondary adrenal insufficiency     is presenting for follow up of secondary AI, hypothyroidism, prolactinoma.    Last saw Dr. Bangura in 12/2016    Ms.Neatrice HENRI Lomax was diagnosed with prolactinoma ~ in 2006 since then she was on cabergoline and was getting regular MRI. Her MRI did not show decreased in size but increase in size so she was referred to  and she had surgery in 7/2014.       Denies breast tenderness or nipple discharge.     Secondary adrenal insufficiency. .  HC 10-0-5-0.   She denies any dizziness, nausea, vomiting, lightheadedness. Decreased appetite. Weight stable. No myalgias     Optometry visit 10/2016 - no retinopathy     Secondary hypothyroidism  On levothyroxine 75 mcg daily. Takes on empty stomach separate from other meds.  Weight stable. Occ cold intolerance. Has hot flashes as well. Regular bm's. Has some dry skin. No excessive hair loss. No tremors or palpitations.      HbA1c improved and metformin d/c by PCP in summer 2017     Has hypertension on dyazide and verapamil with stable blood pressure.     Vit d insufficiency - takes vit d 2000 IU daily    Doing ellipitical and  3-4 days per week    Review of Systems   Constitutional: Negative for unexpected weight change.   Eyes: Negative for visual disturbance.   Respiratory: Negative for shortness of breath.    Cardiovascular: Negative for chest pain.   Gastrointestinal: Negative for abdominal pain.   Musculoskeletal: Negative for arthralgias.   Skin: Negative for wound.   Neurological: Negative for headaches.   Hematological: Does not bruise/bleed easily.   Psychiatric/Behavioral: Negative for sleep disturbance.       Objective:     /70   Pulse 78   Ht 5' 1" (1.549 m)   Wt 100.4 kg (221 lb 5.5 oz)   LMP 07/21/2014   BMI 41.82 kg/m²      Physical Exam   Neck: No thyromegaly present.   Cardiovascular: Normal rate.  "   Pulmonary/Chest: Effort normal.   Abdominal: Soft.   Musculoskeletal: She exhibits no edema.   Vitals reviewed.      Assessment:     1. Pituitary macroadenoma    2. Hyperprolactinemia    3. Obesity, morbid, BMI 40.0-49.9    4. Impaired glucose tolerance    5. Secondary adrenal insufficiency    6. Secondary hypothyroidism    7. Hypothyroidism, unspecified type        Plan:   1. Discussed with Dr. Kiersten Bower and she recommend repeating MRI brain. Will order MRI brain to ensure no tumor recurrence.  2. Prolactin level normal. Monitor prolactin yearly.  3. Currently exercising and losing weight.  4. Off of metformin per pcp. Periodic a1c monitoring.  5. Continue HC 10mg in the morning, 5mg afternoon. Recommended medical alert bracelet. She plans to order online. She declined emergency dexamethasone Rx. Discussed sick day precautions including increasing HC by 2-3x for moderate/severe illness for 3 days.  6. FT4 in normal range. Goal upper half normal range. Continue lt4 75mcg daily. Yearly FT4.  7. Management as above.      Return in about 1 year (around 12/21/2018).    Discussed with Dr. Sveta Staton MD

## 2017-12-29 ENCOUNTER — TELEPHONE (OUTPATIENT)
Dept: ENDOCRINOLOGY | Facility: CLINIC | Age: 52
End: 2017-12-29

## 2017-12-29 DIAGNOSIS — E22.1 HYPERPROLACTINEMIA: Primary | ICD-10-CM

## 2018-01-04 ENCOUNTER — PATIENT MESSAGE (OUTPATIENT)
Dept: ENDOCRINOLOGY | Facility: CLINIC | Age: 53
End: 2018-01-04

## 2018-01-04 ENCOUNTER — TELEPHONE (OUTPATIENT)
Dept: ENDOCRINOLOGY | Facility: CLINIC | Age: 53
End: 2018-01-04

## 2018-01-04 DIAGNOSIS — D35.2 PITUITARY ADENOMA: Primary | ICD-10-CM

## 2018-01-15 ENCOUNTER — TELEPHONE (OUTPATIENT)
Dept: ENDOCRINOLOGY | Facility: CLINIC | Age: 53
End: 2018-01-15

## 2018-01-15 ENCOUNTER — TELEPHONE (OUTPATIENT)
Dept: NEUROSURGERY | Facility: CLINIC | Age: 53
End: 2018-01-15

## 2018-01-15 ENCOUNTER — PATIENT MESSAGE (OUTPATIENT)
Dept: ENDOCRINOLOGY | Facility: CLINIC | Age: 53
End: 2018-01-15

## 2018-01-15 DIAGNOSIS — E23.7 PITUITARY ABNORMALITY: Primary | ICD-10-CM

## 2018-01-15 NOTE — TELEPHONE ENCOUNTER
Spoke with pt regarding follow-up with Dr. Willis and visual field testing.  Discussed recent MRI findings.

## 2018-01-15 NOTE — TELEPHONE ENCOUNTER
----- Message from Kavitha Willingham MA sent at 1/4/2018  2:47 PM CST -----  Dr. Enoch Staton would like to ask if you could schedule patient for Neurosurgery follow up for ptiuitary adenoma, thanks

## 2018-01-15 NOTE — TELEPHONE ENCOUNTER
Spoke to patient and she did read the message and she is confused due to she thought the tumor was totally taking out and not sure why she needs these appointment's. She asked if Dr Carrion could please call her not message her threw the protal to go over everything with her.

## 2018-01-15 NOTE — TELEPHONE ENCOUNTER
----- Message from Marci Franklin sent at 1/15/2018  3:25 PM CST -----  Contact: self  Pt is calling in regards to why she was scheduled to see Dr. Willis. Per pt would like a call back in regards to this.    Pt can be reached at [phone].    Thank you

## 2018-01-16 ENCOUNTER — TELEPHONE (OUTPATIENT)
Dept: NEUROSURGERY | Facility: CLINIC | Age: 53
End: 2018-01-16

## 2018-01-16 NOTE — TELEPHONE ENCOUNTER
Spoke to pt about having labs today at [facility] (since IGF-1 goes to [place]). Pt agreed.

## 2018-01-19 ENCOUNTER — PATIENT MESSAGE (OUTPATIENT)
Dept: ENDOCRINOLOGY | Facility: CLINIC | Age: 53
End: 2018-01-19

## 2018-01-19 ENCOUNTER — OFFICE VISIT (OUTPATIENT)
Dept: NEUROSURGERY | Facility: CLINIC | Age: 53
End: 2018-01-19
Payer: COMMERCIAL

## 2018-01-19 VITALS
SYSTOLIC BLOOD PRESSURE: 138 MMHG | DIASTOLIC BLOOD PRESSURE: 86 MMHG | HEIGHT: 60 IN | TEMPERATURE: 99 F | HEART RATE: 65 BPM

## 2018-01-19 DIAGNOSIS — E23.7 PITUITARY ABNORMALITY: ICD-10-CM

## 2018-01-19 DIAGNOSIS — D35.2 PITUITARY ADENOMA: Primary | ICD-10-CM

## 2018-01-19 PROCEDURE — 99999 PR PBB SHADOW E&M-EST. PATIENT-LVL III: CPT | Mod: PBBFAC,,, | Performed by: NEUROLOGICAL SURGERY

## 2018-01-19 PROCEDURE — 99214 OFFICE O/P EST MOD 30 MIN: CPT | Mod: S$GLB,,, | Performed by: NEUROLOGICAL SURGERY

## 2018-01-19 NOTE — PATIENT INSTRUCTIONS
MRI Brain W/WO contrast dated 1/02/2018:  MRI stable, no new nodular or mass effect. Grossly unchanged from previous films.     Patient knows to call Dr. Staton's office to see if he can lower her thyroid replacement medication or further evaluate for the low TSH levels.    Will see patient in 2 years for follow up with MRI.

## 2018-01-19 NOTE — PROGRESS NOTES
Subjective:   I, Mabel Heaton, attest that this documentation has been prepared under the direction and in the presence of Iker Willis MD.     Patient ID: Matt Lomax is a 52 y.o. female     Chief Complaint: No chief complaint on file.      HPI  The patient is a 52 y.o. female who presents today for a follow up. The patient has a known pituitary tumor diagnosed 4 years ago. We did a resection of a transphenoidal pituitary adenoma on 07/21/2014. Patient last followed up 2 years ago. The patient reports she has been doing well, she reports recent 50lb weight loss for her health. She has no complaints at this time.       Review of Systems   Constitutional: Negative for activity change, fatigue, fever and unexpected weight change.   HENT: Negative for facial swelling.    Eyes: Negative.    Respiratory: Negative.    Cardiovascular: Negative.    Gastrointestinal: Negative for diarrhea, nausea and vomiting.   Genitourinary: Negative.    Musculoskeletal: Negative for back pain, joint swelling, myalgias and neck pain.   Neurological: Negative for dizziness, weakness, numbness and headaches.   Psychiatric/Behavioral: Negative.       Past Medical History:   Diagnosis Date    HTN (hypertension)     Hyperprolactinemia     Morbid obesity     JUAN (obstructive sleep apnea)     Pituitary adenoma     Secondary hypothyroidism 4/21/2015    Type II or unspecified type diabetes mellitus without mention of complication, uncontrolled     Vitamin D deficiency disease        Objective:      Vitals:    01/19/18 1317   BP: 138/86   Pulse: 65   Temp: 99 °F (37.2 °C)      Physical Exam   Constitutional: She is oriented to person, place, and time. She appears well-developed and well-nourished.   HENT:   Head: Normocephalic and atraumatic.   Neck: Neck supple.   Neurological: She is alert and oriented to person, place, and time. No cranial nerve deficit. She displays a negative Romberg sign. GCS eye subscore is 4. GCS verbal  subscore is 5. GCS motor subscore is 6.     Labs only significant for low TSH. Patient states she is on thyroid replacement therapy and will consult with her endocrinologist for this issue.    IMAGING:  MRI Brain W/WO contrast dated 1/02/2018:  MRI stable, no new nodular or mass effect. Grossly unchanged from previous films.     I have personally reviewed the images with the pt.      I, Dr. Iker Willis, personally performed the services described in this documentation. All medical record entries made by the scribe, Mabel Heaton, were at my direction and in my presence.  I have reviewed the chart and agree that the record reflects my personal performance and is accurate and complete. Iker Willis MD.  12:59 PM 01/19/2018     Assessment:       1. Pituitary adenoma    2. Pituitary abnormality         Plan:       MRI Brain W/WO contrast dated 1/02/2018:  MRI stable, no new nodular or mass effect. Grossly unchanged from previous films.     Patient knows to call Dr. Staton's office to see if he can lower her thyroid replacement medication or further evaluate for the low TSH levels found on labs.    Will see patient in 2 years for follow up with MRI.

## 2018-01-19 NOTE — LETTER
January 19, 2018      Enoch Staton MD  [address]  [facility] [address]           [facility]  [facility]  [address]  [facility] [address]  Phone: [phone]          Patient: Matt Lomax   MR Number: [id number]   YOB: 1965   Date of Visit: 1/19/2018       Dear Dr. Enoch Staton:    Thank you for referring Matt Lomax to me for evaluation. Attached you will find relevant portions of my assessment and plan of care.    If you have questions, please do not hesitate to call me. I look forward to following Matt Lomax along with you.    Sincerely,    Jacqueline Beavers, RN    Enclosure  CC:  No Recipients    If you would like to receive this communication electronically, please contact [email or web address] or [phone] to request more information on [employer] access.    For providers and/or their staff who would like to refer a patient to [facility], please contact us through our one-stop-shop provider referral line, [facility], at [phone].    If you feel you have received this communication in error or would no longer like to receive these types of communications, please e-mail [email or web address]

## 2018-01-22 ENCOUNTER — PATIENT MESSAGE (OUTPATIENT)
Dept: ENDOCRINOLOGY | Facility: CLINIC | Age: 53
End: 2018-01-22

## 2018-01-22 ENCOUNTER — PATIENT MESSAGE (OUTPATIENT)
Dept: NEUROSURGERY | Facility: CLINIC | Age: 53
End: 2018-01-22

## 2018-01-22 ENCOUNTER — TELEPHONE (OUTPATIENT)
Dept: ENDOCRINOLOGY | Facility: CLINIC | Age: 53
End: 2018-01-22

## 2018-02-04 DIAGNOSIS — I10 ESSENTIAL HYPERTENSION: ICD-10-CM

## 2018-02-04 RX ORDER — TRIAMTERENE AND HYDROCHLOROTHIAZIDE 37.5; 25 MG/1; MG/1
1 CAPSULE ORAL DAILY
Qty: 30 CAPSULE | Refills: 5 | Status: SHIPPED | OUTPATIENT
Start: 2018-02-04 | End: 2018-07-19 | Stop reason: SDUPTHER

## 2018-03-18 DIAGNOSIS — I10 ESSENTIAL HYPERTENSION: ICD-10-CM

## 2018-03-18 RX ORDER — VERAPAMIL HYDROCHLORIDE 240 MG/1
TABLET, FILM COATED, EXTENDED RELEASE ORAL
Qty: 30 TABLET | Refills: 5 | Status: SHIPPED | OUTPATIENT
Start: 2018-03-18 | End: 2018-09-13 | Stop reason: SDUPTHER

## 2018-06-11 ENCOUNTER — CLINICAL SUPPORT (OUTPATIENT)
Dept: OCCUPATIONAL MEDICINE | Facility: CLINIC | Age: 53
End: 2018-06-11

## 2018-06-11 DIAGNOSIS — Z00.00 ENCOUNTER FOR PHYSICAL EXAMINATION: ICD-10-CM

## 2018-06-11 PROCEDURE — 99499 UNLISTED E&M SERVICE: CPT | Mod: S$GLB,,, | Performed by: PREVENTIVE MEDICINE

## 2018-07-19 DIAGNOSIS — I10 ESSENTIAL HYPERTENSION: ICD-10-CM

## 2018-07-19 RX ORDER — TRIAMTERENE AND HYDROCHLOROTHIAZIDE 37.5; 25 MG/1; MG/1
1 CAPSULE ORAL DAILY
Qty: 90 CAPSULE | Refills: 1 | Status: SHIPPED | OUTPATIENT
Start: 2018-07-19 | End: 2019-02-15 | Stop reason: SDUPTHER

## 2018-08-17 DIAGNOSIS — E11.9 TYPE 2 DIABETES MELLITUS WITHOUT COMPLICATION, UNSPECIFIED WHETHER LONG TERM INSULIN USE: ICD-10-CM

## 2018-09-13 DIAGNOSIS — I10 ESSENTIAL HYPERTENSION: ICD-10-CM

## 2018-09-13 RX ORDER — VERAPAMIL HYDROCHLORIDE 240 MG/1
TABLET, FILM COATED, EXTENDED RELEASE ORAL
Qty: 30 TABLET | Refills: 5 | Status: SHIPPED | OUTPATIENT
Start: 2018-09-13 | End: 2019-04-01 | Stop reason: SDUPTHER

## 2018-09-14 ENCOUNTER — IMMUNIZATION (OUTPATIENT)
Dept: INTERNAL MEDICINE | Facility: CLINIC | Age: 53
End: 2018-09-14
Payer: COMMERCIAL

## 2018-09-14 ENCOUNTER — LAB VISIT (OUTPATIENT)
Dept: LAB | Facility: HOSPITAL | Age: 53
End: 2018-09-14
Attending: INTERNAL MEDICINE
Payer: COMMERCIAL

## 2018-09-14 ENCOUNTER — OFFICE VISIT (OUTPATIENT)
Dept: INTERNAL MEDICINE | Facility: CLINIC | Age: 53
End: 2018-09-14
Payer: COMMERCIAL

## 2018-09-14 VITALS
HEART RATE: 65 BPM | SYSTOLIC BLOOD PRESSURE: 112 MMHG | WEIGHT: 220.5 LBS | BODY MASS INDEX: 43.29 KG/M2 | DIASTOLIC BLOOD PRESSURE: 70 MMHG | HEIGHT: 60 IN

## 2018-09-14 DIAGNOSIS — E11.9 TYPE 2 DIABETES MELLITUS WITHOUT COMPLICATION, WITHOUT LONG-TERM CURRENT USE OF INSULIN: ICD-10-CM

## 2018-09-14 DIAGNOSIS — Z23 INFLUENZA VACCINE NEEDED: ICD-10-CM

## 2018-09-14 DIAGNOSIS — G47.00 INSOMNIA, UNSPECIFIED TYPE: ICD-10-CM

## 2018-09-14 DIAGNOSIS — Z00.00 ROUTINE MEDICAL EXAM: Primary | ICD-10-CM

## 2018-09-14 DIAGNOSIS — G47.33 OSA ON CPAP: ICD-10-CM

## 2018-09-14 DIAGNOSIS — E03.8 SECONDARY HYPOTHYROIDISM: ICD-10-CM

## 2018-09-14 DIAGNOSIS — I10 ESSENTIAL HYPERTENSION: ICD-10-CM

## 2018-09-14 DIAGNOSIS — Z12.31 ENCOUNTER FOR SCREENING MAMMOGRAM FOR BREAST CANCER: ICD-10-CM

## 2018-09-14 DIAGNOSIS — Z00.00 ROUTINE MEDICAL EXAM: ICD-10-CM

## 2018-09-14 DIAGNOSIS — E66.01 MORBID OBESITY WITH BMI OF 40.0-44.9, ADULT: ICD-10-CM

## 2018-09-14 DIAGNOSIS — N95.2 POST-MENOPAUSAL ATROPHIC VAGINITIS: ICD-10-CM

## 2018-09-14 DIAGNOSIS — E11.69 DYSLIPIDEMIA ASSOCIATED WITH TYPE 2 DIABETES MELLITUS: ICD-10-CM

## 2018-09-14 DIAGNOSIS — E78.5 DYSLIPIDEMIA ASSOCIATED WITH TYPE 2 DIABETES MELLITUS: ICD-10-CM

## 2018-09-14 LAB
25(OH)D3+25(OH)D2 SERPL-MCNC: 34 NG/ML
ALBUMIN SERPL BCP-MCNC: 4 G/DL
ALP SERPL-CCNC: 85 U/L
ALT SERPL W/O P-5'-P-CCNC: 12 U/L
ANION GAP SERPL CALC-SCNC: 7 MMOL/L
AST SERPL-CCNC: 20 U/L
BASOPHILS # BLD AUTO: 0.05 K/UL
BASOPHILS NFR BLD: 0.8 %
BILIRUB SERPL-MCNC: 0.4 MG/DL
BILIRUB UR QL STRIP: NEGATIVE
BUN SERPL-MCNC: 13 MG/DL
CALCIUM SERPL-MCNC: 9.6 MG/DL
CHLORIDE SERPL-SCNC: 104 MMOL/L
CHOLEST SERPL-MCNC: 216 MG/DL
CHOLEST/HDLC SERPL: 6.2 {RATIO}
CLARITY UR REFRACT.AUTO: CLEAR
CO2 SERPL-SCNC: 30 MMOL/L
COLOR UR AUTO: YELLOW
CREAT SERPL-MCNC: 1 MG/DL
DIFFERENTIAL METHOD: NORMAL
EOSINOPHIL # BLD AUTO: 0.1 K/UL
EOSINOPHIL NFR BLD: 1.8 %
ERYTHROCYTE [DISTWIDTH] IN BLOOD BY AUTOMATED COUNT: 13.5 %
EST. GFR  (AFRICAN AMERICAN): >60 ML/MIN/1.73 M^2
EST. GFR  (NON AFRICAN AMERICAN): >60 ML/MIN/1.73 M^2
ESTIMATED AVG GLUCOSE: 111 MG/DL
GLUCOSE SERPL-MCNC: 93 MG/DL
GLUCOSE UR QL STRIP: NEGATIVE
HBA1C MFR BLD HPLC: 5.5 %
HCT VFR BLD AUTO: 43.1 %
HDLC SERPL-MCNC: 35 MG/DL
HDLC SERPL: 16.2 %
HGB BLD-MCNC: 14.5 G/DL
HGB UR QL STRIP: NEGATIVE
KETONES UR QL STRIP: NEGATIVE
LDLC SERPL CALC-MCNC: 151.6 MG/DL
LEUKOCYTE ESTERASE UR QL STRIP: NEGATIVE
LYMPHOCYTES # BLD AUTO: 2.9 K/UL
LYMPHOCYTES NFR BLD: 43.4 %
MCH RBC QN AUTO: 29.9 PG
MCHC RBC AUTO-ENTMCNC: 33.6 G/DL
MCV RBC AUTO: 89 FL
MONOCYTES # BLD AUTO: 0.3 K/UL
MONOCYTES NFR BLD: 4.2 %
NEUTROPHILS # BLD AUTO: 3.3 K/UL
NEUTROPHILS NFR BLD: 49.6 %
NITRITE UR QL STRIP: NEGATIVE
NONHDLC SERPL-MCNC: 181 MG/DL
PH UR STRIP: 7 [PH] (ref 5–8)
PLATELET # BLD AUTO: 292 K/UL
PMV BLD AUTO: 10.5 FL
POTASSIUM SERPL-SCNC: 3.6 MMOL/L
PROT SERPL-MCNC: 7.7 G/DL
PROT UR QL STRIP: NEGATIVE
RBC # BLD AUTO: 4.85 M/UL
SODIUM SERPL-SCNC: 141 MMOL/L
SP GR UR STRIP: 1.01 (ref 1–1.03)
T4 FREE SERPL-MCNC: 1.23 NG/DL
TRIGL SERPL-MCNC: 147 MG/DL
TSH SERPL DL<=0.005 MIU/L-ACNC: <0.01 UIU/ML
URN SPEC COLLECT METH UR: NORMAL
UROBILINOGEN UR STRIP-ACNC: NEGATIVE EU/DL
WBC # BLD AUTO: 6.59 K/UL

## 2018-09-14 PROCEDURE — 3074F SYST BP LT 130 MM HG: CPT | Mod: CPTII,S$GLB,, | Performed by: INTERNAL MEDICINE

## 2018-09-14 PROCEDURE — 99999 PR PBB SHADOW E&M-EST. PATIENT-LVL III: CPT | Mod: PBBFAC,,, | Performed by: INTERNAL MEDICINE

## 2018-09-14 PROCEDURE — 36415 COLL VENOUS BLD VENIPUNCTURE: CPT

## 2018-09-14 PROCEDURE — 83036 HEMOGLOBIN GLYCOSYLATED A1C: CPT

## 2018-09-14 PROCEDURE — 3078F DIAST BP <80 MM HG: CPT | Mod: CPTII,S$GLB,, | Performed by: INTERNAL MEDICINE

## 2018-09-14 PROCEDURE — 80053 COMPREHEN METABOLIC PANEL: CPT

## 2018-09-14 PROCEDURE — 80061 LIPID PANEL: CPT

## 2018-09-14 PROCEDURE — 84439 ASSAY OF FREE THYROXINE: CPT

## 2018-09-14 PROCEDURE — 84443 ASSAY THYROID STIM HORMONE: CPT

## 2018-09-14 PROCEDURE — 82306 VITAMIN D 25 HYDROXY: CPT

## 2018-09-14 PROCEDURE — 3044F HG A1C LEVEL LT 7.0%: CPT | Mod: CPTII,S$GLB,, | Performed by: INTERNAL MEDICINE

## 2018-09-14 PROCEDURE — 85025 COMPLETE CBC W/AUTO DIFF WBC: CPT

## 2018-09-14 PROCEDURE — 90686 IIV4 VACC NO PRSV 0.5 ML IM: CPT | Mod: S$GLB,,, | Performed by: INTERNAL MEDICINE

## 2018-09-14 PROCEDURE — 81003 URINALYSIS AUTO W/O SCOPE: CPT

## 2018-09-14 PROCEDURE — 99396 PREV VISIT EST AGE 40-64: CPT | Mod: 25,S$GLB,, | Performed by: INTERNAL MEDICINE

## 2018-09-14 PROCEDURE — 90471 IMMUNIZATION ADMIN: CPT | Mod: S$GLB,,, | Performed by: INTERNAL MEDICINE

## 2018-09-14 RX ORDER — ZOLPIDEM TARTRATE 5 MG/1
5 TABLET ORAL NIGHTLY PRN
Qty: 20 TABLET | Refills: 1 | Status: SHIPPED | OUTPATIENT
Start: 2018-09-14 | End: 2020-02-28 | Stop reason: SDUPTHER

## 2018-09-14 NOTE — PROGRESS NOTES
Subjective:       Patient ID: Matt Lomax is a 52 y.o. female.    Chief Complaint: Annual Exam    Last seen 11 months ago. Returns for annual exam and f/u chronic medical conditions. No home glucose monitoring for diet-controlled diabetes. She has been exercising at the gym with cardio and weight training five days per week. She feels that she has lost fat and gained muscle because her clothes fit very differently, though her actual weight has not changed.      Past medical history:     Hypertension.  Pituitary adenoma, with secondary hypothyroidism and secondary adrenal insufficiency, Endo and Neurosurgery following, MRI stable .    Morbid Obesity..  Dyslipidemia.  Diabetes Type 2, HbA1c 5.3% Dec. '17.   JUAN on CPAP.   Vitamin D insufficiency    Mammogram normal . Pelvic exam . BMD ordered, not done. Colonoscopy  one 3mm hyperplastic polyp, repeat in 5 yrs. Eye exam 10/16. Podiatry 10/16. Tdap . Flu shot and Pneumovax .  Labs : CBC and CMP normal, Vit D 62, TChol 185, , HDL 33, , Urinalysis clear, Urine Microalbumin normal.      Negative treadmill stress test in 2008. Echocardiogram 2009 showed normal LV function with EF 60%, diastolic dysfunction and mild pulmonary hypertension.     Past Surgical History:  x 1. Transphenoidal Resection of Pituitary Adenoma .    Social History: Nonsmoker, no alcohol consumption,  with 3 children. Drives a school bus.    Family medical history: Mother  age 46 with an MI, and was a diabetic. No family history of cancer.    Allergies: ACE inhibitors - angioedema. Amlodipine - swelling. Clonidine - sedation.    Medications: list reviewed and reconciled.                 Review of Systems   Constitutional: Negative for activity change, appetite change, fatigue, fever and unexpected weight change.   HENT: Negative for congestion, hearing loss, rhinorrhea, sneezing, sore throat, trouble swallowing  and voice change.    Eyes: Negative for pain and visual disturbance.   Respiratory: Negative for cough, chest tightness, shortness of breath and wheezing.    Cardiovascular: Negative for chest pain, palpitations and leg swelling.   Gastrointestinal: Negative for abdominal pain, blood in stool, constipation, diarrhea, nausea and vomiting.   Genitourinary: Negative for difficulty urinating, dysuria, flank pain, frequency, hematuria, menstrual problem and urgency.   Musculoskeletal: Negative for back pain, joint swelling, myalgias and neck pain.        Bilateral foot pain associated with bunions, but she does not want surgery at this time.    Skin: Negative for color change and rash.   Neurological: Negative for dizziness, syncope, facial asymmetry, speech difficulty, weakness, numbness and headaches.   Hematological: Negative for adenopathy. Does not bruise/bleed easily.   Psychiatric/Behavioral: Negative for agitation, dysphoric mood and sleep disturbance. The patient is not nervous/anxious.        Objective:    /70, Pulse 65, Ht 5', Wt 220.5 lbs (unchanged), BMI=43  Physical Exam   Constitutional: She is oriented to person, place, and time. She appears well-developed and well-nourished. No distress.   HENT:   Head: Normocephalic and atraumatic.   Right Ear: External ear normal.   Left Ear: External ear normal.   Nose: Nose normal.   Mouth/Throat: Oropharynx is clear and moist. No oropharyngeal exudate.   Eyes: Conjunctivae and EOM are normal. Pupils are equal, round, and reactive to light. Right conjunctiva is not injected. Left conjunctiva is not injected. No scleral icterus.   Neck: Normal range of motion. Neck supple. No JVD present. Carotid bruit is not present. No thyromegaly present.   Cardiovascular: Normal rate, regular rhythm, normal heart sounds and intact distal pulses. Exam reveals no gallop and no friction rub.   No murmur heard.  Pulmonary/Chest: Effort normal and breath sounds normal. No  respiratory distress. She has no wheezes. She has no rhonchi. She has no rales.   Abdominal: Soft. Bowel sounds are normal. She exhibits no distension and no mass. There is no hepatosplenomegaly. There is no tenderness. There is no CVA tenderness.   Musculoskeletal: Normal range of motion. She exhibits no edema, tenderness or deformity.   Protective Sensation (w/ 10 gram monofilament):  Right: Intact  Left: Intact    Visual Inspection:  Normal -  Bilateral except severe bunion deformity right worse than left, and one small callus on sole of left foot below first MTP joint.    Pedal Pulses:   Right: Present  Left: Present    Posterior tibialis:   Right:Present  Left: Present     Lymphadenopathy:     She has no cervical adenopathy.   Neurological: She is alert and oriented to person, place, and time. She has normal strength and normal reflexes. No cranial nerve deficit. She exhibits normal muscle tone. Coordination and gait normal.   Skin: Skin is warm and dry. No lesion and no rash noted. She is not diaphoretic. No cyanosis or erythema. No pallor. Nails show no clubbing.   Psychiatric: She has a normal mood and affect. Her behavior is normal. Judgment and thought content normal.   Vitals reviewed.      Assessment:       1. Routine medical exam    2. Type 2 diabetes mellitus without complication, without long-term current use of insulin    3. Dyslipidemia associated with type 2 diabetes mellitus    4. Essential hypertension    5. Secondary hypothyroidism    6. JUAN on CPAP    7. Morbid obesity with BMI of 40.0-44.9, adult    8. Insomnia, unspecified type    9. Post-menopausal atrophic vaginitis    10. Encounter for screening mammogram for breast cancer    11. Influenza vaccine needed        Plan:       Routine medical exam  -     CBC auto differential; Future; Expected date: 09/14/2018  -     Comprehensive metabolic panel; Future; Expected date: 09/14/2018  -     Lipid panel; Future; Expected date: 09/14/2018  -      Vitamin D; Future; Expected date: 09/14/2018  -     Urinalysis    Type 2 diabetes mellitus without complication, without long-term current use of insulin  -     Hemoglobin A1c; Future; Expected date: 09/14/2018  -     Microalbumin/creatinine urine ratio  -     Ambulatory Referral to Optometry    Dyslipidemia associated with type 2 diabetes mellitus    Essential hypertension controlled, continue Dyazide and Verapamil recently refilled.    Secondary hypothyroidism  -     TSH; Future; Expected date: 09/14/2018    JUAN on CPAP - she is non-compliant, encouraged nightly use.     Morbid obesity with BMI of 40.0-44.9, adult - continue diet and exercise.     Insomnia, unspecified type  -     zolpidem (AMBIEN) 5 MG Tab; Take 1 tablet (5 mg total) by mouth nightly as needed (Insomnia.).  Dispense: 20 tablet; Refill: 1    Post-menopausal atrophic vaginitis  -     conjugated estrogens (PREMARIN) vaginal cream; Use 1 gram nightly 2- 3 times per week  Dispense: 30 g; Refill: 5    Encounter for screening mammogram for breast cancer  -     Mammo Digital Screening Bilat with CAD due now.    Influenza vaccine needed - Flu shot today.

## 2018-09-23 ENCOUNTER — PATIENT MESSAGE (OUTPATIENT)
Dept: INTERNAL MEDICINE | Facility: CLINIC | Age: 53
End: 2018-09-23

## 2018-09-23 DIAGNOSIS — E78.5 HYPERLIPIDEMIA, UNSPECIFIED HYPERLIPIDEMIA TYPE: ICD-10-CM

## 2018-09-23 RX ORDER — PRAVASTATIN SODIUM 20 MG/1
20 TABLET ORAL DAILY
Qty: 30 TABLET | Refills: 3 | Status: SHIPPED | OUTPATIENT
Start: 2018-09-23 | End: 2018-11-18 | Stop reason: SDUPTHER

## 2018-09-23 NOTE — TELEPHONE ENCOUNTER
Results posted. Starting Pravastatin for high cholesterol, schedule repeat fasting lipids in 2-3 months.

## 2018-09-29 DIAGNOSIS — E78.5 DYSLIPIDEMIA ASSOCIATED WITH TYPE 2 DIABETES MELLITUS: ICD-10-CM

## 2018-09-29 DIAGNOSIS — E11.69 DYSLIPIDEMIA ASSOCIATED WITH TYPE 2 DIABETES MELLITUS: ICD-10-CM

## 2018-09-30 RX ORDER — PRAVASTATIN SODIUM 10 MG/1
10 TABLET ORAL DAILY
Qty: 30 TABLET | Refills: 0 | OUTPATIENT
Start: 2018-09-30

## 2018-10-02 DIAGNOSIS — E78.5 DYSLIPIDEMIA ASSOCIATED WITH TYPE 2 DIABETES MELLITUS: ICD-10-CM

## 2018-10-02 DIAGNOSIS — E11.69 DYSLIPIDEMIA ASSOCIATED WITH TYPE 2 DIABETES MELLITUS: ICD-10-CM

## 2018-10-02 RX ORDER — PRAVASTATIN SODIUM 10 MG/1
10 TABLET ORAL DAILY
Qty: 30 TABLET | Refills: 0 | OUTPATIENT
Start: 2018-10-02

## 2018-10-16 ENCOUNTER — HOSPITAL ENCOUNTER (OUTPATIENT)
Dept: RADIOLOGY | Facility: HOSPITAL | Age: 53
Discharge: HOME | End: 2018-10-16
Attending: INTERNAL MEDICINE
Payer: COMMERCIAL

## 2018-10-16 VITALS — WEIGHT: 220 LBS | BODY MASS INDEX: 43.19 KG/M2 | HEIGHT: 60 IN

## 2018-10-16 DIAGNOSIS — Z12.31 ENCOUNTER FOR SCREENING MAMMOGRAM FOR BREAST CANCER: ICD-10-CM

## 2018-10-16 PROCEDURE — 77067 SCR MAMMO BI INCL CAD: CPT | Mod: 26,,, | Performed by: RADIOLOGY

## 2018-10-16 PROCEDURE — 77063 BREAST TOMOSYNTHESIS BI: CPT | Mod: TC

## 2018-10-16 PROCEDURE — 77063 BREAST TOMOSYNTHESIS BI: CPT | Mod: 26,,, | Performed by: RADIOLOGY

## 2018-10-16 PROCEDURE — 77067 SCR MAMMO BI INCL CAD: CPT | Mod: TC

## 2018-11-01 ENCOUNTER — OFFICE VISIT (OUTPATIENT)
Dept: OPTOMETRY | Facility: CLINIC | Age: 53
End: 2018-11-01
Payer: COMMERCIAL

## 2018-11-01 DIAGNOSIS — H52.203 MYOPIA WITH ASTIGMATISM AND PRESBYOPIA, BILATERAL: ICD-10-CM

## 2018-11-01 DIAGNOSIS — H52.4 MYOPIA WITH ASTIGMATISM AND PRESBYOPIA, BILATERAL: ICD-10-CM

## 2018-11-01 DIAGNOSIS — D35.2 PITUITARY ADENOMA: ICD-10-CM

## 2018-11-01 DIAGNOSIS — H34.211 HOLLENHORST PLAQUE, RIGHT EYE: ICD-10-CM

## 2018-11-01 DIAGNOSIS — H04.123 BILATERAL DRY EYES: ICD-10-CM

## 2018-11-01 DIAGNOSIS — H25.13 NUCLEAR SCLEROTIC CATARACT OF BOTH EYES: ICD-10-CM

## 2018-11-01 DIAGNOSIS — E11.9 TYPE 2 DIABETES MELLITUS WITHOUT RETINOPATHY: Primary | ICD-10-CM

## 2018-11-01 DIAGNOSIS — H11.153 PINGUECULA OF BOTH EYES: ICD-10-CM

## 2018-11-01 DIAGNOSIS — H52.13 MYOPIA WITH ASTIGMATISM AND PRESBYOPIA, BILATERAL: ICD-10-CM

## 2018-11-01 PROCEDURE — 92014 COMPRE OPH EXAM EST PT 1/>: CPT | Mod: S$GLB,,, | Performed by: OPTOMETRIST

## 2018-11-01 PROCEDURE — 99999 PR PBB SHADOW E&M-EST. PATIENT-LVL III: CPT | Mod: PBBFAC,,, | Performed by: OPTOMETRIST

## 2018-11-01 PROCEDURE — 92015 DETERMINE REFRACTIVE STATE: CPT | Mod: S$GLB,,, | Performed by: OPTOMETRIST

## 2018-11-01 NOTE — Clinical Note
Dear Dr. Hardy,Thank you for referring Ms. Lomax for a diabetic eye examination. There is no diabetic retinopathy. However, she has a small cholesterol plaque in one of her retinal vessels. She said she is not taking the Pravastatin, and needs a new prescription for this medication sent to the [facility] in [place] on [address]. Can you please take care of this?Also, I have asked her to start 81mg Aspirin daily. Is this okay with you? I will check her eyes again in 1 year. Please let me know if you have questions.Sincerely,Gertrude Ferrer OD

## 2018-11-01 NOTE — PATIENT INSTRUCTIONS
"You have dryness of your eyes. Please use over-the-counter artificial tears or lubricants (such as Systane, Refresh, Blink, Genteal), 1 drop in each eye 3-4 times per day. Please avoid drops that advertise redness-relief as these can be unhealthy for your eyes and can cause permanent redness if used long-term.    ==============================================    PINGUECULA    A pinguecula (itr-OLPU-[id number]-estela) is a yellowish, slightly raised thickening of the conjunctiva on the white part of the eye (sclera), close to the edge of the cornea. Pingueculae typically occur on the part of the sclera that is between your eyelids and therefore exposed to the sun.  While pingueculae are more common in middle-aged or older people who spend a lot of time in the sun, they can also be found in younger people and even children -- especially those who are often outdoors without protection such as sunglasses or hats.    Pinguecula Signs and Symptoms  In most people, pingueculae cause few symptoms. But a pinguecula that is irritated might create a feeling that something is in the eye (called a "foreign body sensation").  In some cases, pingueculae become swollen and inflamed, a condition called pingueculitis. Irritation and eye redness from pingueculitis usually result from exposure to sun, wind, dust or extremely dry conditions.    ==============================================       "

## 2018-11-01 NOTE — PROGRESS NOTES
HPI     Ms. Matt Lomax was referred by Selin Hardy MD  for a   diabetic eye exam.    Occasional dry eyes, uses OTC drops prn (maybe once per week). She also   reports occasional flare-up of ocular allergies (about once per month).     She reports satisfactory vision all ranges with glasses.  Would patient like a refraction today? yes    (+)drops: OTC drops (name unknown, prn)  (-)flashes  (-)floaters  (-)diplopia  (-)eye pain    Diabetic: ? (pt was referred by PCP for Type 2 DM, but pt denies having DM   and it is not on her Problem List)  Hemoglobin A1C       Date                     Value               Ref Range             Status           09/14/2018               5.5                 4.0 - 5.6 %         Final  12/29/2017               5.3                 4.0 - 5.6 %         Final  11/27/2017               5.4                 4.0 - 5.6 %         Final    OCULAR HISTORY  Last Eye Exam: 10/03/16 with Dr. Ferrer  (-)eye surgery   Pituitary macroadenoma   * S/p removal in 2013.    * Last HVF in 2015 normal OD/OS.   Dry eyes    FAMILY HISTORY  (-)Glaucoma none          Last edited by Gertrude Ferrer, OD on 11/1/2018  2:04 PM. (History)            Assessment /Plan     For exam results, see Encounter Report.    Type 2 diabetes mellitus without retinopathy   No retinopathy noted OU. Monitor with yearly DFE.     Bilateral dry eyes  Pinguecula of both eyes   Recommended artificial tears BID-TID OU and UV protection. Monitor.    Nuclear sclerotic cataract of both eyes   Not visually significant OU. Continue UV protection. Monitor.      Hollenhorst plaque, right eye   Not previously noted. +hyperlipidemia. Pt says she is not taking Pravastatin as prescribed; encouraged her to do so (pt requests that I ask Dr. Hardy to re-send Rx).    Stressed importance of cholesterol control. Continue diet changes and regular exercise. Start daily 81mg Aspirin.   RTC of go to ED with any transient vision loss.     Pituitary  adenoma   H/o pituitary macroadenoma, s/p removal in 2013. Last HVF in 2015 normal OD/OS. Confrontation visual fields normal today OD/OS. Monitor.    Myopia with astigmatism and presbyopia, bilateral   Small change OU. New glasses prescription released, adaptation expected.  New glasses optional.   Eyeglass Final Rx     Eyeglass Final Rx       Sphere Cylinder Axis Add    Right -1.50 +0.75 005 +2.00    Left -1.50 +1.25 010 +2.00    Expiration Date:  11/2/2019                 RTC 1 year

## 2018-11-02 ENCOUNTER — PATIENT MESSAGE (OUTPATIENT)
Dept: INTERNAL MEDICINE | Facility: CLINIC | Age: 53
End: 2018-11-02

## 2018-11-05 ENCOUNTER — PATIENT MESSAGE (OUTPATIENT)
Dept: INTERNAL MEDICINE | Facility: CLINIC | Age: 53
End: 2018-11-05

## 2018-11-16 ENCOUNTER — LAB VISIT (OUTPATIENT)
Dept: LAB | Facility: HOSPITAL | Age: 53
End: 2018-11-16
Attending: INTERNAL MEDICINE
Payer: COMMERCIAL

## 2018-11-16 DIAGNOSIS — E78.5 HYPERLIPIDEMIA, UNSPECIFIED HYPERLIPIDEMIA TYPE: ICD-10-CM

## 2018-11-16 LAB
ALT SERPL W/O P-5'-P-CCNC: 14 U/L
AST SERPL-CCNC: 19 U/L
CHOLEST SERPL-MCNC: 182 MG/DL
CHOLEST/HDLC SERPL: 4.7 {RATIO}
HDLC SERPL-MCNC: 39 MG/DL
HDLC SERPL: 21.4 %
LDLC SERPL CALC-MCNC: 122.6 MG/DL
NONHDLC SERPL-MCNC: 143 MG/DL
TRIGL SERPL-MCNC: 102 MG/DL

## 2018-11-16 PROCEDURE — 84450 TRANSFERASE (AST) (SGOT): CPT

## 2018-11-16 PROCEDURE — 84460 ALANINE AMINO (ALT) (SGPT): CPT

## 2018-11-16 PROCEDURE — 80061 LIPID PANEL: CPT

## 2018-11-16 PROCEDURE — 36415 COLL VENOUS BLD VENIPUNCTURE: CPT

## 2018-11-18 ENCOUNTER — PATIENT MESSAGE (OUTPATIENT)
Dept: INTERNAL MEDICINE | Facility: CLINIC | Age: 53
End: 2018-11-18

## 2018-11-18 DIAGNOSIS — E78.5 HYPERLIPIDEMIA, UNSPECIFIED HYPERLIPIDEMIA TYPE: ICD-10-CM

## 2018-11-18 RX ORDER — PRAVASTATIN SODIUM 20 MG/1
20 TABLET ORAL DAILY
Qty: 90 TABLET | Refills: 1 | Status: SHIPPED | OUTPATIENT
Start: 2018-11-18 | End: 2019-08-12 | Stop reason: SDUPTHER

## 2019-01-10 RX ORDER — HYDROCORTISONE 10 MG/1
TABLET ORAL
Qty: 135 TABLET | Refills: 1 | Status: SHIPPED | OUTPATIENT
Start: 2019-01-10 | End: 2019-05-17 | Stop reason: SDUPTHER

## 2019-01-24 DIAGNOSIS — E03.9 HYPOTHYROIDISM, UNSPECIFIED TYPE: ICD-10-CM

## 2019-01-24 RX ORDER — LEVOTHYROXINE SODIUM 75 UG/1
75 TABLET ORAL
Qty: 90 TABLET | Refills: 3 | Status: SHIPPED | OUTPATIENT
Start: 2019-01-24 | End: 2019-05-17 | Stop reason: SDUPTHER

## 2019-01-24 NOTE — TELEPHONE ENCOUNTER
----- Message from Avtar Jacobo sent at 1/24/2019 12:48 PM CST -----  Contact: self  Pt called to speak with nurse about  levothyroxine (SYNTHROID) 75 MCG tablet she says she completely out.Pt have new pharmacy.            Pt callback number [phone]

## 2019-02-15 DIAGNOSIS — I10 ESSENTIAL HYPERTENSION: ICD-10-CM

## 2019-02-15 RX ORDER — TRIAMTERENE AND HYDROCHLOROTHIAZIDE 37.5; 25 MG/1; MG/1
CAPSULE ORAL
Qty: 90 CAPSULE | Refills: 1 | Status: SHIPPED | OUTPATIENT
Start: 2019-02-15 | End: 2019-08-12 | Stop reason: SDUPTHER

## 2019-03-08 ENCOUNTER — OFFICE VISIT (OUTPATIENT)
Dept: OBSTETRICS AND GYNECOLOGY | Facility: CLINIC | Age: 54
End: 2019-03-08
Payer: COMMERCIAL

## 2019-03-08 VITALS
SYSTOLIC BLOOD PRESSURE: 114 MMHG | WEIGHT: 224.44 LBS | BODY MASS INDEX: 43.83 KG/M2 | DIASTOLIC BLOOD PRESSURE: 78 MMHG

## 2019-03-08 DIAGNOSIS — R82.90 ABNORMAL URINE ODOR: ICD-10-CM

## 2019-03-08 DIAGNOSIS — Z01.419 WELL WOMAN EXAM WITH ROUTINE GYNECOLOGICAL EXAM: Primary | ICD-10-CM

## 2019-03-08 DIAGNOSIS — Z12.4 ROUTINE CERVICAL SMEAR: ICD-10-CM

## 2019-03-08 PROCEDURE — 87510 GARDNER VAG DNA DIR PROBE: CPT

## 2019-03-08 PROCEDURE — 3078F DIAST BP <80 MM HG: CPT | Mod: CPTII,S$GLB,, | Performed by: OBSTETRICS & GYNECOLOGY

## 2019-03-08 PROCEDURE — 3078F PR MOST RECENT DIASTOLIC BLOOD PRESSURE < 80 MM HG: ICD-10-PCS | Mod: CPTII,S$GLB,, | Performed by: OBSTETRICS & GYNECOLOGY

## 2019-03-08 PROCEDURE — 3074F PR MOST RECENT SYSTOLIC BLOOD PRESSURE < 130 MM HG: ICD-10-PCS | Mod: CPTII,S$GLB,, | Performed by: OBSTETRICS & GYNECOLOGY

## 2019-03-08 PROCEDURE — 99396 PR PREVENTIVE VISIT,EST,40-64: ICD-10-PCS | Mod: S$GLB,,, | Performed by: OBSTETRICS & GYNECOLOGY

## 2019-03-08 PROCEDURE — 87086 URINE CULTURE/COLONY COUNT: CPT

## 2019-03-08 PROCEDURE — 3074F SYST BP LT 130 MM HG: CPT | Mod: CPTII,S$GLB,, | Performed by: OBSTETRICS & GYNECOLOGY

## 2019-03-08 PROCEDURE — 88175 CYTOPATH C/V AUTO FLUID REDO: CPT

## 2019-03-08 PROCEDURE — 99999 PR PBB SHADOW E&M-EST. PATIENT-LVL III: ICD-10-PCS | Mod: PBBFAC,,, | Performed by: OBSTETRICS & GYNECOLOGY

## 2019-03-08 PROCEDURE — 99999 PR PBB SHADOW E&M-EST. PATIENT-LVL III: CPT | Mod: PBBFAC,,, | Performed by: OBSTETRICS & GYNECOLOGY

## 2019-03-08 PROCEDURE — 87480 CANDIDA DNA DIR PROBE: CPT

## 2019-03-08 PROCEDURE — 99396 PREV VISIT EST AGE 40-64: CPT | Mod: S$GLB,,, | Performed by: OBSTETRICS & GYNECOLOGY

## 2019-03-08 NOTE — PROGRESS NOTES
OBSTETRIC HISTORY:   OB History      Para Term  AB Living    3 3 0     3    SAB TAB Ectopic Multiple Live Births            3         COMPREHENSIVE GYN HISTORY:  PAP History: Denies abnormal Paps.  Infection History: Denies STDs. Denies PID.  Benign History: Denies uterine fibroids. Denies ovarian cysts. Denies endometriosis.   Cancer History: Denies cervical cancer. Denies uterine cancer or hyperplasia. Denies ovarian cancer. Denies vulvar cancer or pre-cancer. Denies vaginal cancer or pre-cancer. Denies breast cancer. Denies colon cancer.  Sexual Activity History:   reports that she currently engages in sexual activity. She reports using the following method of birth control/protection: Surgical.   Menstrual History: Every 30 days, flows for 5 days. Heavy (changes pad and tampon every 1-2 hours)  flow.  Dysmenorrhea History: Denies dysmenorrhea.         HPI:   53 y.o.  Patient's last menstrual period was 2014.   Patient is  here for her annual gynecologic exam.  She has complaints of urine odor. She denies breast and bowel complaints.    ROS:  GENERAL: Denies weight gain or weight loss. Feeling well overall.   SKIN: Denies rash or lesions.   HEAD: Denies headache.   NODES: Denies enlarged lymph nodes.   CHEST: Denies shortness of breath.   ABDOMEN: No abdominal pain, constipation, diarrhea, nausea, vomiting or rectal bleeding.   URINARY: No frequency, dysuria, hematuria, or burning on urination.  REPRODUCTIVE: See HPI.   BREASTS: The patient denies pain, lumps, or nipple discharge.   HEMATOLOGIC: No easy bruisability.   MUSCULOSKELETAL: Denies joint pain or back pain.   NEUROLOGIC: Denies weakness.   PSYCHIATRIC: Denies depression, anxiety or mood swings.    PE:   /78   Wt 101.8 kg (224 lb 6.9 oz)   LMP 2014   BMI 43.83 kg/m²   APPEARANCE: Well nourished, well developed, in no acute distress.  NECK: Neck symmetric without  thyromegaly.  NODES: No inguinal, cervical lymph node  enlargement.  CHEST: Lungs clear to auscultation.  HEART: Regular rate and rhythm, no murmurs, rubs or gallops.  ABDOMEN: Soft. No tenderness or masses. No hernias.  BREASTS: Symmetrical, no skin changes or visible lesions. No palpable masses, nipple discharge or adenopathy bilaterally.  PELVIC:   VULVA: No lesions. Normal female genitalia.  URETHRAL MEATUS: Normal size and location, no lesions, no prolapse.  URETHRA: No masses, tenderness, prolapse or scarring.  VAGINA: Moist and well rugated, no discharge, no significant cystocele or rectocele.  CERVIX: No lesions and discharge.  UTERUS: Normal size, regular shape, mobile, non-tender, bladder base nontender.  ADNEXA: No masses or tenderness.    PROCEDURES:  Pap smear    Assessment:  Normal Gynecologic Exam  Urine odor-- u/a negative (urine culture and Affirm done)    Plan:  Mammogram and Colonoscopy if indicated by current recommendations.  Return to clinic in one year or for any problems or complaints.    Counseling:  Patient was counseled today on A.C.S. Pap guidelines and recommendations for yearly pelvic exams and monthly self breast exams; to see her PCP for other health maintenance. Regular exercise and healthy diet.

## 2019-03-10 ENCOUNTER — TELEPHONE (OUTPATIENT)
Dept: OBSTETRICS AND GYNECOLOGY | Facility: CLINIC | Age: 54
End: 2019-03-10

## 2019-03-10 LAB
BACTERIA UR CULT: NORMAL
BACTERIAL VAGINOSIS DNA: POSITIVE
CANDIDA GLABRATA DNA: NEGATIVE
CANDIDA KRUSEI DNA: NEGATIVE
CANDIDA RRNA VAG QL PROBE: NEGATIVE
T VAGINALIS RRNA GENITAL QL PROBE: NEGATIVE

## 2019-03-10 RX ORDER — METRONIDAZOLE 500 MG/1
500 TABLET ORAL EVERY 12 HOURS
Qty: 14 TABLET | Refills: 0 | Status: SHIPPED | OUTPATIENT
Start: 2019-03-10 | End: 2019-03-17

## 2019-03-11 NOTE — TELEPHONE ENCOUNTER
Per patient's request, for future prescriptions, preferred pharmacy has been changed to [facility] in [place]

## 2019-04-01 DIAGNOSIS — I10 ESSENTIAL HYPERTENSION: ICD-10-CM

## 2019-04-01 RX ORDER — VERAPAMIL HYDROCHLORIDE 240 MG/1
TABLET, FILM COATED, EXTENDED RELEASE ORAL
Qty: 30 TABLET | Refills: 5 | Status: SHIPPED | OUTPATIENT
Start: 2019-04-01 | End: 2019-09-10 | Stop reason: SDUPTHER

## 2019-04-05 DIAGNOSIS — E11.9 TYPE 2 DIABETES MELLITUS WITHOUT COMPLICATION: ICD-10-CM

## 2019-05-15 DIAGNOSIS — E11.9 TYPE 2 DIABETES MELLITUS WITHOUT COMPLICATION: ICD-10-CM

## 2019-05-17 ENCOUNTER — OFFICE VISIT (OUTPATIENT)
Dept: ENDOCRINOLOGY | Facility: CLINIC | Age: 54
End: 2019-05-17
Payer: COMMERCIAL

## 2019-05-17 VITALS
DIASTOLIC BLOOD PRESSURE: 92 MMHG | HEIGHT: 60 IN | HEART RATE: 72 BPM | SYSTOLIC BLOOD PRESSURE: 124 MMHG | WEIGHT: 227.06 LBS | BODY MASS INDEX: 44.58 KG/M2 | RESPIRATION RATE: 18 BRPM

## 2019-05-17 DIAGNOSIS — Z86.39 H/O SECONDARY HYPOGONADISM: ICD-10-CM

## 2019-05-17 DIAGNOSIS — E03.9 HYPOTHYROIDISM, UNSPECIFIED TYPE: ICD-10-CM

## 2019-05-17 DIAGNOSIS — D35.2 PITUITARY ADENOMA: ICD-10-CM

## 2019-05-17 DIAGNOSIS — E03.8 SECONDARY HYPOTHYROIDISM: ICD-10-CM

## 2019-05-17 DIAGNOSIS — E22.1 HYPERPROLACTINEMIA: Primary | ICD-10-CM

## 2019-05-17 DIAGNOSIS — E27.49 SECONDARY ADRENAL INSUFFICIENCY: ICD-10-CM

## 2019-05-17 DIAGNOSIS — I10 HTN (HYPERTENSION), BENIGN: ICD-10-CM

## 2019-05-17 DIAGNOSIS — E55.9 VITAMIN D DEFICIENCY: ICD-10-CM

## 2019-05-17 DIAGNOSIS — R73.02 IMPAIRED GLUCOSE TOLERANCE: ICD-10-CM

## 2019-05-17 PROCEDURE — 99214 PR OFFICE/OUTPT VISIT, EST, LEVL IV, 30-39 MIN: ICD-10-PCS | Mod: S$GLB,,, | Performed by: INTERNAL MEDICINE

## 2019-05-17 PROCEDURE — 99214 OFFICE O/P EST MOD 30 MIN: CPT | Mod: S$GLB,,, | Performed by: INTERNAL MEDICINE

## 2019-05-17 PROCEDURE — 3080F DIAST BP >= 90 MM HG: CPT | Mod: CPTII,S$GLB,, | Performed by: INTERNAL MEDICINE

## 2019-05-17 PROCEDURE — 99999 PR PBB SHADOW E&M-EST. PATIENT-LVL III: CPT | Mod: PBBFAC,,, | Performed by: INTERNAL MEDICINE

## 2019-05-17 PROCEDURE — 3080F PR MOST RECENT DIASTOLIC BLOOD PRESSURE >= 90 MM HG: ICD-10-PCS | Mod: CPTII,S$GLB,, | Performed by: INTERNAL MEDICINE

## 2019-05-17 PROCEDURE — 3074F SYST BP LT 130 MM HG: CPT | Mod: CPTII,S$GLB,, | Performed by: INTERNAL MEDICINE

## 2019-05-17 PROCEDURE — 3008F BODY MASS INDEX DOCD: CPT | Mod: CPTII,S$GLB,, | Performed by: INTERNAL MEDICINE

## 2019-05-17 PROCEDURE — 3074F PR MOST RECENT SYSTOLIC BLOOD PRESSURE < 130 MM HG: ICD-10-PCS | Mod: CPTII,S$GLB,, | Performed by: INTERNAL MEDICINE

## 2019-05-17 PROCEDURE — 99999 PR PBB SHADOW E&M-EST. PATIENT-LVL III: ICD-10-PCS | Mod: PBBFAC,,, | Performed by: INTERNAL MEDICINE

## 2019-05-17 PROCEDURE — 3008F PR BODY MASS INDEX (BMI) DOCUMENTED: ICD-10-PCS | Mod: CPTII,S$GLB,, | Performed by: INTERNAL MEDICINE

## 2019-05-17 RX ORDER — HYDROCORTISONE 10 MG/1
TABLET ORAL
Qty: 135 TABLET | Refills: 1 | Status: SHIPPED | OUTPATIENT
Start: 2019-05-17 | End: 2020-01-24 | Stop reason: SDUPTHER

## 2019-05-17 RX ORDER — LEVOTHYROXINE SODIUM 75 UG/1
75 TABLET ORAL
Qty: 90 TABLET | Refills: 3 | Status: SHIPPED | OUTPATIENT
Start: 2019-05-17 | End: 2020-02-14 | Stop reason: SDUPTHER

## 2019-05-17 NOTE — PROGRESS NOTES
Endocrinology Return Visit     Matt Lomax is a 53 y.o. female who returns for follow-up of pituitary macroadenoma s/p resection, secondary AI, hypothyroidism, elevated prolactin     Previously seen by , new to me    Diagnosed with macroadenoma, possible prolactinoma, ~ in 2006 and started on cabergoline at that time due to elevated prolactin (highest value I can find is 67).  Monitored with MRI with increase in size over time so she was referred to  and she had surgery in 7/2014.      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     Secondary adrenal insufficiency.   HC 10-0-5-0 but taking afternoon dose right before bed  She denies any dizziness, nausea, vomiting, lightheadedness. Appetite so-so. Weight stable. No myalgias  Cortisol in 2018 13.4    Secondary hypothyroidism  On levothyroxine 75 mcg daily. Takes on empty stomach separate from other meds.  Weight stable. Occ cold intolerance. Has hot flashes as well. Regular bm's. Has some dry skin. No excessive hair loss. No tremors or palpitations.     No cycles since day of surgery  Occasional hot flashes    Optometry visit 11/2018 - no retinopathy  Denies headache or vision change  Seen by [facility] 1/2018. Recommended repeat MRI in 2 years       HbA1c improved and metformin d/c by PCP in summer 2017     Has hypertension on dyazide and verapamil with stable blood pressure.      Vit d insufficiency - takes vit d daily but unsure dose, maybe 1000 units     Will be having foot surgery in coming weeks        MEDICATIONS    Current Outpatient Medications:     conjugated estrogens (PREMARIN) vaginal cream, Use 1 gram nightly 2- 3 times per week, Disp: 30 g, Rfl: 5    hydrocortisone (CORTEF) 10 MG Tab, TAKE 1 TABLET (10MG) WITH BREAKFAST AND 1/2 TABLET (5 MG) WITH DINNER, Disp: 135 tablet, Rfl: 1    levothyroxine (SYNTHROID) 75 MCG  tablet, Take 1 tablet (75 mcg total) by mouth before breakfast., Disp: 90 tablet, Rfl: 3    multivitamin (THERAGRAN) per tablet, Take 1 tablet by mouth every morning. , Disp: , Rfl:     pravastatin (PRAVACHOL) 20 MG tablet, Take 1 tablet (20 mg total) by mouth once daily. For Cholesterol., Disp: 90 tablet, Rfl: 1    triamterene-hydrochlorothiazide 37.5-25 mg (DYAZIDE) 37.5-25 mg per capsule, TAKE 1 CAPSULE BY MOUTH EVERY DAY, Disp: 90 capsule, Rfl: 1    verapamil (CALAN-SR) 240 MG CR tablet, TAKE 1 TABLET (240 MG TOTAL) BY MOUTH ONCE DAILY., Disp: 30 tablet, Rfl: 5    zolpidem (AMBIEN) 5 MG Tab, Take 1 tablet (5 mg total) by mouth nightly as needed (Insomnia.)., Disp: 20 tablet, Rfl: 1    ALLERGIES  Review of patient's allergies indicates:   Allergen Reactions    Lisinopril Hives       PHYSICAL EXAM  BP (!) 124/92   Pulse 72   Resp 18   Ht 5' (1.524 m)   Wt 103 kg (227 lb 1.2 oz)   LMP 07/21/2014   BMI 44.35 kg/m²   Body mass index is 44.35 kg/m².  General Appearance:  well appearing, pleasant, NAD  Skin:  no rashes or lesions, +acanthosis  HEENT:  EOMI, MMM, sclera anicteric  Neck: no thyromegaly  Neurologic:  A&O x3  Psychiatric:  normal mood and affect    LABS  Component      Latest Ref Rng & Units 1/17/2018   Somatomedin (IGF-I)      40 - 217 ng/mL 45   Z Score      -2.0 - 2.0 SD -1.65   Cortisol -8 AM      4.30 - 22.40 ug/dL 13.2   Estrogen      pg/mL 46   FSH      See Text mIU/mL 0.20   Growth Hormone      0.00 - 8.00 ng/mL <0.1   TSH      0.400 - 4.000 uIU/mL <0.015 (L)   Free T4      0.71 - 1.51 ng/dL 1.13   Prolactin      5.2 - 26.5 ng/mL 22.5   LH      See Text mIU/mL <0.1       IMAGING  MRI 1/2018:Stable postsurgical changes from prior transsphenoidal sellar mass partial resection as described above with findings suggestive of a intraosseous or partially calcified meningioma centered at the level of the dorsum sellae (with pituitary macroadenoma in the differential diagnosis). Residual  enhancement appears similar to the postoperative study of 04/21/2015. No new nodular or mass effect.    Path 7/2014:  FINAL PATHOLOGIC DIAGNOSIS  1. A FRAGMENT OF CILIATED COLUMNAR EPITHELIAL LINED SOFT TISSUE. NO NEOPLASIA  IDENTIFIED.  2. FRAGMENTS OF NORMAL APPEARING PITUITARY TISSUE AND SURROUNDING DENSE  FIBROCONNECTIVE TISSUE WITH HEMORRHAGE AND CAUTERY ARTIFACT.  NOTE: THIS CASE WAS REVIEWED BY DR. CHILD WHO CONCURS WITH THE DIAGNOSIS.    ASSESSMENT/PLAN  Pituitary adenoma  S/p resection in 2014  Stable imaging in 1/2018 with repeat planned for 1/2020  Management of panhypopit as below    Hyperprolactinemia  Resolved following adenoma resection and likely due to stalk effect    Secondary adrenal insufficiency  Continue HC 10 mg AM/5 mg PM but move PM dose to early afternoon when reports symptoms of fatigue  Will repeat cortisol after holding preceding PM and AM dose as last 13 but suspect on treatment    Secondary hypothyroidism  Check FT4  TSH not reliable in setting of central disease so would not titrate based on this result  Continue LT4 75 mcg daily with titration pending result    Impaired glucose tolerance  Last A1c at goal  Continue annual monitoring    H/O secondary hypogonadism  Continue premarin vaginal cream  Occasional hot flashes    HTN (hypertension), benign  BP at goal      RTC 1 year. Can be scheduled in pituitary clinic    Trudy Qiu MD

## 2019-05-17 NOTE — ASSESSMENT & PLAN NOTE
Check FT4  TSH not reliable in setting of central disease so would not titrate based on this result  Continue LT4 75 mcg daily with titration pending result

## 2019-05-17 NOTE — PATIENT INSTRUCTIONS
"Surgery HC dosing  The day of surgery: Hydrocortisone IV or oral 25mg every 8hr, first dose given on the way to OR  Can take 25 mg oral in afternoon if discharged home before second dose given    Day 1 after surgery: Hydrocortisone, oral, 25mg twice daily     Day 2 after surgery: Hydrocortisone, oral, 20mg morning and 10mg afternoon     Day 3 after surgery: back to home dose, Hydrocortisone, oral, 10mg morning and 5mg afternoon      Adrenal insufficiency self-care instructions and sick day rules     Adrenal insufficiency, which involves a deficiency in steroid hormones that are normally produced by the body, can result in symptoms that include generalized and severe fatigue, malaise, dizziness, and low blood pressure. Should you develop any of these symptoms, please call us immediately or go to the ER if severe symptoms develop.    Please refer to the following instructions for patients with adrenal insufficiency:  1. Please get an alert bracelet that says "Adrenal insufficiency. Give stress doses of steroids in case of illness."     2. If you become nauseous and are unable to keep hydrocortisone down, you need to go to an emergency room to get this medication via IV.     3. If you are ill with a low-grade fever of  F, please double your dose of hydrocortisone. If you have a fever of >100 F, please triple your hydrocortisone dose for 3 days or until fever resolves.     4. If you are undergoing a planned medical procedure (such as minor surgery, colonoscopy) or a major dental procedure (tooth extraction, root canal etc) you should double your dose the day before and the day of and the day after the procedure.    5. If a major surgery requiring general anesthesia is being planned, please notify your endocrinologist so that we can give instructions to the anesthesia team that will be taking care of you with regards to the amount of hydrocortisone to be given during surgery.    Please call us with any questions " and to notify us that you are ill at home or are heading to ER/hospital so that we can help you through the situation and communicate with the physicians taking care of you.

## 2019-05-17 NOTE — ASSESSMENT & PLAN NOTE
S/p resection in 2014  Stable imaging in 1/2018 with repeat planned for 1/2020  Management of panhypopit as below

## 2019-05-17 NOTE — ASSESSMENT & PLAN NOTE
Continue HC 10 mg AM/5 mg PM but move PM dose to early afternoon when reports symptoms of fatigue  Will repeat cortisol after holding preceding PM and AM dose as last 13 but suspect on treatment

## 2019-05-29 ENCOUNTER — PATIENT MESSAGE (OUTPATIENT)
Dept: ENDOCRINOLOGY | Facility: CLINIC | Age: 54
End: 2019-05-29

## 2019-06-18 ENCOUNTER — OFFICE VISIT (OUTPATIENT)
Dept: INTERNAL MEDICINE | Facility: CLINIC | Age: 54
End: 2019-06-18
Payer: COMMERCIAL

## 2019-06-18 VITALS
BODY MASS INDEX: 45.1 KG/M2 | SYSTOLIC BLOOD PRESSURE: 134 MMHG | HEART RATE: 66 BPM | DIASTOLIC BLOOD PRESSURE: 80 MMHG | HEIGHT: 60 IN | WEIGHT: 229.75 LBS

## 2019-06-18 DIAGNOSIS — Z01.818 PRE-OP EXAMINATION: ICD-10-CM

## 2019-06-18 DIAGNOSIS — I10 HTN (HYPERTENSION), BENIGN: ICD-10-CM

## 2019-06-18 DIAGNOSIS — E78.5 HYPERLIPIDEMIA, UNSPECIFIED HYPERLIPIDEMIA TYPE: ICD-10-CM

## 2019-06-18 DIAGNOSIS — E27.49 SECONDARY ADRENAL INSUFFICIENCY: ICD-10-CM

## 2019-06-18 DIAGNOSIS — G47.33 OSA ON CPAP: ICD-10-CM

## 2019-06-18 DIAGNOSIS — G47.00 INSOMNIA, UNSPECIFIED TYPE: ICD-10-CM

## 2019-06-18 DIAGNOSIS — D35.2 PITUITARY ADENOMA: ICD-10-CM

## 2019-06-18 DIAGNOSIS — R73.02 IMPAIRED GLUCOSE TOLERANCE: ICD-10-CM

## 2019-06-18 DIAGNOSIS — E03.8 SECONDARY HYPOTHYROIDISM: ICD-10-CM

## 2019-06-18 DIAGNOSIS — E66.01 OBESITY, MORBID, BMI 40.0-49.9: ICD-10-CM

## 2019-06-18 DIAGNOSIS — E55.9 VITAMIN D DEFICIENCY: ICD-10-CM

## 2019-06-18 LAB
ALBUMIN/CREAT UR: 8 UG/MG (ref 0–30)
CREAT UR-MCNC: 75 MG/DL (ref 15–325)
MICROALBUMIN UR DL<=1MG/L-MCNC: 6 UG/ML

## 2019-06-18 PROCEDURE — 93005 ELECTROCARDIOGRAM TRACING: CPT | Mod: S$GLB,,, | Performed by: FAMILY MEDICINE

## 2019-06-18 PROCEDURE — 99213 OFFICE O/P EST LOW 20 MIN: CPT | Mod: S$GLB,,, | Performed by: FAMILY MEDICINE

## 2019-06-18 PROCEDURE — 82043 UR ALBUMIN QUANTITATIVE: CPT

## 2019-06-18 PROCEDURE — 3008F BODY MASS INDEX DOCD: CPT | Mod: CPTII,S$GLB,, | Performed by: FAMILY MEDICINE

## 2019-06-18 PROCEDURE — 3079F DIAST BP 80-89 MM HG: CPT | Mod: CPTII,S$GLB,, | Performed by: FAMILY MEDICINE

## 2019-06-18 PROCEDURE — 3075F SYST BP GE 130 - 139MM HG: CPT | Mod: CPTII,S$GLB,, | Performed by: FAMILY MEDICINE

## 2019-06-18 PROCEDURE — 93010 EKG 12-LEAD: ICD-10-PCS | Mod: S$GLB,,, | Performed by: INTERNAL MEDICINE

## 2019-06-18 PROCEDURE — 99999 PR PBB SHADOW E&M-EST. PATIENT-LVL III: ICD-10-PCS | Mod: PBBFAC,,, | Performed by: FAMILY MEDICINE

## 2019-06-18 PROCEDURE — 99213 PR OFFICE/OUTPT VISIT, EST, LEVL III, 20-29 MIN: ICD-10-PCS | Mod: S$GLB,,, | Performed by: FAMILY MEDICINE

## 2019-06-18 PROCEDURE — 93010 ELECTROCARDIOGRAM REPORT: CPT | Mod: S$GLB,,, | Performed by: INTERNAL MEDICINE

## 2019-06-18 PROCEDURE — 3079F PR MOST RECENT DIASTOLIC BLOOD PRESSURE 80-89 MM HG: ICD-10-PCS | Mod: CPTII,S$GLB,, | Performed by: FAMILY MEDICINE

## 2019-06-18 PROCEDURE — 99999 PR PBB SHADOW E&M-EST. PATIENT-LVL III: CPT | Mod: PBBFAC,,, | Performed by: FAMILY MEDICINE

## 2019-06-18 PROCEDURE — 93005 EKG 12-LEAD: ICD-10-PCS | Mod: S$GLB,,, | Performed by: FAMILY MEDICINE

## 2019-06-18 PROCEDURE — 3008F PR BODY MASS INDEX (BMI) DOCUMENTED: ICD-10-PCS | Mod: CPTII,S$GLB,, | Performed by: FAMILY MEDICINE

## 2019-06-18 PROCEDURE — 3075F PR MOST RECENT SYSTOLIC BLOOD PRESS GE 130-139MM HG: ICD-10-PCS | Mod: CPTII,S$GLB,, | Performed by: FAMILY MEDICINE

## 2019-06-18 NOTE — PROGRESS NOTES
Subjective:      Patient ID: Matt Lomax is a 53 y.o. female.    Chief Complaint: Establish Care      HPI:  Matt Lomax is a 53 year old female with history of secondary hypogonadism, hyperlipidemia, hyperprolactinemia, hypertension, hypothyroidism, impaired glucose tolerance, morbid obesity, JUAN on CPAP, pituitary adenoma, and secondary adrenal insufficiency who presents to clinic today to establish care.    States she would like to get a bunion on her right foot removed.  Previously saw Dr. Madrid, podiatry, but was told she needed clearance.  Has not surgery date.  Denies any history of problems with anesthesia.  Denies any recent chest pain, shortness of breath, or palpitations.    ?Can take care of self, such as eat, dress, or use the toilet (1 MET):  YES  ?Can walk up a flight of steps or a hill or walk on level ground at 3 to 4 mph (4 METs):  YES  ?Can do heavy work around the house, such as scrubbing floors or lifting or moving heavy furniture, or climb two flights of stairs (between 4 and 10 METs): YES  ?Can participate in strenuous sports such as swimming, singles tennis, football, basketball, and skiing (>10 METs): YES    Adrenal insufficiency:  Takes hydrocortisone 10 mg by mouth with breakfast and half a tablet with dinner.  Followed by endocrinology    HLD:  Prescribed pravastatin 20 mg by mouth daily.  HDL 39 11/16/18, lipid panel otherwise normal.    HTN:  Blood pressure mildly above goal today.  Prescribed triamterene-hydrochlorothiazide 37.5-25 mg by mouth daily and verapamil 240 mg by mouth daily.  Does not monitor home blood pressures.  Does not adhere to a low sodium.    Hypothyroidism:  Prescribed levothyroxine 75 mcg by mouth daily.  TSH within normal limits 9/14/18, FT4 normal 5/29/19.    Hyperprolactinemia; pituitary adenoma:  Diagnosed with macroadenoma, possible prolactinoma, ~ in 2006 and started on cabergoline at that time due to elevated prolactin (highest value I can  find is 67).  Monitored with MRI with increase in size over time so she was referred to  and she had surgery in 2014.  Prolactin has been normal since time of surgery off cabergoline.  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  Previously on metformin but this was discontinued in 2017 by PCP.    Insomnia:  Prescribed Ambien 5 mg by mouth nightly as needed for sleep.  States she does not take this all the time.    Obesity:  BMI 44.86.  States she walks 3-4 days per week, 3 miles on those occasions.  States she goes to the gym four days per week but has not been going as much recently.    JUAN:  On CPAP; does not use nightly.  States it is hard to use due to her hair dc currently.      Health Care Maintenance:  Last tetanus booster:  16  Shingles vaccination:  Deferred today.  Pneumovax:  16  Last routine labs:  18  Last pap smear:  3/8/19; negative for malignancy  Last mammogram:  10/16/18; no evidence of malignancy; ordered through OBGYN  Last colonoscopy:  17, one 3 mm polyp removed; repeat 5 years      Past Medical History:   Diagnosis Date    HTN (hypertension)     Hyperprolactinemia     Morbid obesity     JUAN (obstructive sleep apnea)     Pituitary adenoma     Secondary hypothyroidism 2015    Type II or unspecified type diabetes mellitus without mention of complication, uncontrolled     Vitamin D deficiency disease        Past Surgical History:   Procedure Laterality Date    Breast Reduction       SECTION      x 3    COLONOSCOPY N/A 2017    [facility] (4TH [facility])    GRAFT- FAT N/A 2014    [facility] at [facility] OR 2ND FLR    INSERTION-SPINAL DRAIN N/A 2014    Performed [facility] at [facility] OR 2ND FLR    REPAIR CSF LEAK- SPINE N/A 2014    Per[facility] at [facility] OR 2ND FLR     RESECTION-TRANSPHENOIDAL PITUITARY ADENOMA N/A 7/21/2014    [facility]    TOTAL REDUCTION MAMMOPLASTY      TRANSPHENOIDAL PITUITARY RESECTION  07/21/14    TUBAL LIGATION         Family History   Problem Relation Age of Onset    Diabetes Mother     Hypertension Mother     Heart disease Mother     Hypertension Father     Diabetes Maternal Aunt     No Known Problems Brother     No Known Problems Daughter     No Known Problems Son     No Known Problems Brother     No Known Problems Daughter     No Known Problems Sister     No Known Problems Maternal Uncle     No Known Problems Paternal Aunt     No Known Problems Paternal Uncle     No Known Problems Maternal Grandmother     No Known Problems Maternal Grandfather     No Known Problems Paternal Grandmother     No Known Problems Paternal Grandfather     Breast cancer Neg Hx     Colon cancer Neg Hx     Ovarian cancer Neg Hx     Amblyopia Neg Hx     Blindness Neg Hx     Cancer Neg Hx     Cataracts Neg Hx     Glaucoma Neg Hx     Macular degeneration Neg Hx     Retinal detachment Neg Hx     Strabismus Neg Hx     Stroke Neg Hx     Thyroid disease Neg Hx        Social History     Socioeconomic History    Marital status:      Spouse name: Not on file    Number of children: Not on file    Years of education: Not on file    Highest education level: Not on file   Occupational History    Occupation:      Employer: [employer]   Social Needs    Financial resource strain: Not on file    Food insecurity:     Worry: Not on file     Inability: Not on file    Transportation needs:     Medical: Not on file     Non-medical: Not on file   Tobacco Use    Smoking status: Never Smoker    Smokeless tobacco: Never Used   Substance and Sexual Activity    Alcohol use: No    Drug use: No    Sexual activity: Not Currently     Partners: Male     Birth control/protection: Surgical,  Post-menopausal   Lifestyle    Physical activity:     Days per week: Not on file     Minutes per session: Not on file    Stress: Not on file   Relationships    Social connections:     Talks on phone: Not on file     Gets together: Not on file     Attends Holiness service: Not on file     Active member of club or organization: Not on file     Attends meetings of clubs or organizations: Not on file     Relationship status: Not on file   Other Topics Concern    Not on file   Social History Narrative    Not on file       Review of Systems   Constitutional: Negative for chills, fatigue and fever.   HENT: Negative for congestion, hearing loss, nosebleeds, rhinorrhea, sore throat and trouble swallowing.    Eyes: Negative for pain and visual disturbance.   Respiratory: Negative for cough, shortness of breath and wheezing.    Cardiovascular: Negative for chest pain and palpitations.   Gastrointestinal: Negative for abdominal distention, abdominal pain, constipation, diarrhea, nausea and vomiting.   Genitourinary: Negative for decreased urine volume, difficulty urinating, dysuria, hematuria and urgency.   Musculoskeletal: Negative for arthralgias, back pain and myalgias.        + Bunions   Skin: Negative for color change and rash.   Neurological: Negative for dizziness, tremors, weakness, light-headedness, numbness and headaches.   Psychiatric/Behavioral: Negative for agitation, behavioral problems and confusion. The patient is not nervous/anxious.      Objective:     Vitals:    06/18/19 0903   BP: 134/80   BP Location: Left arm   Patient Position: Sitting   BP Method: Large (Automatic)   Pulse: 66   Weight: 104.2 kg (229 lb 11.5 oz)   Height: 5' (1.524 m)       Physical Exam   Constitutional: She is oriented to person, place, and time. She appears well-developed and well-nourished.   HENT:   Head: Normocephalic and atraumatic.   Right Ear: External ear normal.   Left Ear: External ear normal.   Nose: Nose normal.    Mouth/Throat: Oropharynx is clear and moist.   Eyes: Pupils are equal, round, and reactive to light. Conjunctivae and EOM are normal.   Neck: Normal range of motion. Neck supple. No tracheal deviation present.   Cardiovascular: Normal rate, regular rhythm and normal heart sounds. Exam reveals no gallop and no friction rub.   No murmur heard.  Pulmonary/Chest: Effort normal and breath sounds normal. No respiratory distress. She has no wheezes. She has no rales.   Abdominal: Soft. Bowel sounds are normal. She exhibits no distension. There is no tenderness. There is no rebound and no guarding.   Musculoskeletal: Normal range of motion. She exhibits no edema.        Right foot: There is deformity.        Left foot: There is deformity.   Bunions bilaterally   Neurological: She is alert and oriented to person, place, and time. Coordination normal.   Skin: Skin is warm and dry.   Psychiatric: She has a normal mood and affect. Her behavior is normal.   Nursing note and vitals reviewed.     Assessment:      1. Hyperlipidemia, unspecified hyperlipidemia type    2. HTN (hypertension), benign    3. Impaired glucose tolerance    4. Insomnia, unspecified type    5. Obesity, morbid, BMI 40.0-49.9    6. JUAN on CPAP    7. Pituitary adenoma    8. Pre-op examination    9. Secondary adrenal insufficiency    10. Secondary hypothyroidism    11. Vitamin D deficiency      Plan:   Matt was seen today for establish care.    Diagnoses and all orders for this visit:    Hyperlipidemia, unspecified hyperlipidemia type  -     Lipid panel; Future; continue statin    HTN (hypertension), benign  -     CBC auto differential; Future  -     Comprehensive metabolic panel; Future  -     MICROALBUMIN / CREATININE RATIO URINE  -     Continue current regimen; recommended low sodium diet, daily aerobic exercise, and weight loss    Impaired glucose tolerance  -     Hemoglobin A1c; Future; recommended weight loss    Insomnia, unspecified type        -      Stable, continue current regimen.    Obesity, morbid, BMI 40.0-49.9        -     Counseled patient on the importance of a healthy low calorie diet, increased daily aerobic exercise, and weight loss.    JUAN on CPAP        -     Instructed patient to use CPAP nightly; recommended weight loss.    Pituitary adenoma        -     Continue regular follow up with endocrinology    Pre-op examination  -     IN OFFICE EKG 12-LEAD (to Muse) showing sinus bradycardia and shallow T wave inversions in all leads  -     Walking treadmill stress test ordered; EKG portion negative for ischemia, no chest pain reported, average exercise capacity without dysrhythmias  -      Cleared for surgery and anesthesia.    Secondary adrenal insufficiency        -     Reviewed surgery steroid dosing as recommended by endocrinology; recommendations included in after visit summary    Secondary hypothyroidism  -     TSH; Future  -     T4, free; Future  -     Continue current regimen    Vitamin D deficiency  -     Vitamin D; Future

## 2019-06-18 NOTE — PATIENT INSTRUCTIONS
The day of surgery: Hydrocortisone IV or oral 25mg every 8hr, first dose given on the way to OR  Can take 25 mg oral in afternoon if discharged home before second dose given     Day 1 after surgery: Hydrocortisone, oral, 25mg twice daily      Day 2 after surgery: Hydrocortisone, oral, 20mg morning and 10mg afternoon      Day 3 after surgery: back to home dose, Hydrocortisone, oral, 10mg morning and 5mg afternoon

## 2019-06-19 ENCOUNTER — PATIENT MESSAGE (OUTPATIENT)
Dept: INTERNAL MEDICINE | Facility: CLINIC | Age: 54
End: 2019-06-19

## 2019-06-21 ENCOUNTER — LAB VISIT (OUTPATIENT)
Dept: LAB | Facility: HOSPITAL | Age: 54
End: 2019-06-21
Payer: COMMERCIAL

## 2019-06-21 ENCOUNTER — HOSPITAL ENCOUNTER (OUTPATIENT)
Dept: CARDIOLOGY | Facility: CLINIC | Age: 54
Discharge: HOME | End: 2019-06-21
Attending: FAMILY MEDICINE
Payer: COMMERCIAL

## 2019-06-21 DIAGNOSIS — E03.8 SECONDARY HYPOTHYROIDISM: ICD-10-CM

## 2019-06-21 DIAGNOSIS — E78.5 HYPERLIPIDEMIA, UNSPECIFIED HYPERLIPIDEMIA TYPE: ICD-10-CM

## 2019-06-21 DIAGNOSIS — R73.02 IMPAIRED GLUCOSE TOLERANCE: ICD-10-CM

## 2019-06-21 DIAGNOSIS — Z01.818 PRE-OP EXAMINATION: ICD-10-CM

## 2019-06-21 DIAGNOSIS — E55.9 VITAMIN D DEFICIENCY: ICD-10-CM

## 2019-06-21 DIAGNOSIS — I10 HTN (HYPERTENSION), BENIGN: ICD-10-CM

## 2019-06-21 LAB
25(OH)D3+25(OH)D2 SERPL-MCNC: 23 NG/ML (ref 30–96)
ALBUMIN SERPL BCP-MCNC: 3.7 G/DL (ref 3.5–5.2)
ALP SERPL-CCNC: 82 U/L (ref 55–135)
ALT SERPL W/O P-5'-P-CCNC: 9 U/L (ref 10–44)
ANION GAP SERPL CALC-SCNC: 8 MMOL/L (ref 8–16)
AST SERPL-CCNC: 16 U/L (ref 10–40)
BASOPHILS # BLD AUTO: 0.03 K/UL (ref 0–0.2)
BASOPHILS NFR BLD: 0.5 % (ref 0–1.9)
BILIRUB SERPL-MCNC: 0.4 MG/DL (ref 0.1–1)
BUN SERPL-MCNC: 14 MG/DL (ref 6–20)
CALCIUM SERPL-MCNC: 9.7 MG/DL (ref 8.7–10.5)
CHLORIDE SERPL-SCNC: 103 MMOL/L (ref 95–110)
CHOLEST SERPL-MCNC: 162 MG/DL (ref 120–199)
CHOLEST/HDLC SERPL: 5.4 {RATIO} (ref 2–5)
CO2 SERPL-SCNC: 31 MMOL/L (ref 23–29)
CREAT SERPL-MCNC: 1 MG/DL (ref 0.5–1.4)
CV STRESS BASE HR: 70 BPM
DIASTOLIC BLOOD PRESSURE: 80 MMHG
DIFFERENTIAL METHOD: ABNORMAL
EOSINOPHIL # BLD AUTO: 0.2 K/UL (ref 0–0.5)
EOSINOPHIL NFR BLD: 3.7 % (ref 0–8)
ERYTHROCYTE [DISTWIDTH] IN BLOOD BY AUTOMATED COUNT: 13.1 % (ref 11.5–14.5)
EST. GFR  (AFRICAN AMERICAN): >60 ML/MIN/1.73 M^2
EST. GFR  (NON AFRICAN AMERICAN): >60 ML/MIN/1.73 M^2
ESTIMATED AVG GLUCOSE: 117 MG/DL (ref 68–131)
GLUCOSE SERPL-MCNC: 99 MG/DL (ref 70–110)
HBA1C MFR BLD HPLC: 5.7 % (ref 4–5.6)
HCT VFR BLD AUTO: 42.5 % (ref 37–48.5)
HDLC SERPL-MCNC: 30 MG/DL (ref 40–75)
HDLC SERPL: 18.5 % (ref 20–50)
HGB BLD-MCNC: 14.2 G/DL (ref 12–16)
LDLC SERPL CALC-MCNC: 81.6 MG/DL (ref 63–159)
LYMPHOCYTES # BLD AUTO: 3.6 K/UL (ref 1–4.8)
LYMPHOCYTES NFR BLD: 57.9 % (ref 18–48)
MCH RBC QN AUTO: 29.8 PG (ref 27–31)
MCHC RBC AUTO-ENTMCNC: 33.4 G/DL (ref 32–36)
MCV RBC AUTO: 89 FL (ref 82–98)
MONOCYTES # BLD AUTO: 0.2 K/UL (ref 0.3–1)
MONOCYTES NFR BLD: 3.9 % (ref 4–15)
NEUTROPHILS # BLD AUTO: 2.1 K/UL (ref 1.8–7.7)
NEUTROPHILS NFR BLD: 33.8 % (ref 38–73)
NONHDLC SERPL-MCNC: 132 MG/DL
OHS CV CPX 1 MINUTE RECOVERY HEART RATE: 115 BPM
OHS CV CPX 85 PERCENT MAX PREDICTED HEART RATE MALE: 136
OHS CV CPX ESTIMATED METS: 11
OHS CV CPX MAX PREDICTED HEART RATE: 160
OHS CV CPX PATIENT IS FEMALE: 1
OHS CV CPX PATIENT IS MALE: 0
OHS CV CPX PEAK DIASTOLIC BLOOD PRESSURE: 64 MMHG
OHS CV CPX PEAK HEAR RATE: 136 BPM
OHS CV CPX PEAK RATE PRESSURE PRODUCT: NORMAL
OHS CV CPX PEAK SYSTOLIC BLOOD PRESSURE: 188 MMHG
OHS CV CPX PERCENT MAX PREDICTED HEART RATE ACHIEVED: 85
OHS CV CPX RATE PRESSURE PRODUCT PRESENTING: 9940
PLATELET # BLD AUTO: 275 K/UL (ref 150–350)
PMV BLD AUTO: 9.9 FL (ref 9.2–12.9)
POTASSIUM SERPL-SCNC: 3.4 MMOL/L (ref 3.5–5.1)
PROT SERPL-MCNC: 7.3 G/DL (ref 6–8.4)
RBC # BLD AUTO: 4.76 M/UL (ref 4–5.4)
SODIUM SERPL-SCNC: 142 MMOL/L (ref 136–145)
STRESS ECHO POST EXERCISE DUR MIN: 6 MINUTES
STRESS ECHO POST EXERCISE DUR SEC: 40 SECONDS
STRESS ECHO TARGET HR: 142.8 BPM
SYSTOLIC BLOOD PRESSURE: 142 MMHG
T4 FREE SERPL-MCNC: 1.05 NG/DL (ref 0.71–1.51)
TRIGL SERPL-MCNC: 252 MG/DL (ref 30–150)
TSH SERPL DL<=0.005 MIU/L-ACNC: <0.01 UIU/ML (ref 0.4–4)
WBC # BLD AUTO: 6.17 K/UL (ref 3.9–12.7)

## 2019-06-21 PROCEDURE — 36415 COLL VENOUS BLD VENIPUNCTURE: CPT

## 2019-06-21 PROCEDURE — 93015 TREADMILL STRESS TEST (CUPID ONLY): ICD-10-PCS | Mod: S$GLB,,, | Performed by: INTERNAL MEDICINE

## 2019-06-21 PROCEDURE — 84443 ASSAY THYROID STIM HORMONE: CPT

## 2019-06-21 PROCEDURE — 85025 COMPLETE CBC W/AUTO DIFF WBC: CPT

## 2019-06-21 PROCEDURE — 80061 LIPID PANEL: CPT

## 2019-06-21 PROCEDURE — 80053 COMPREHEN METABOLIC PANEL: CPT

## 2019-06-21 PROCEDURE — 83036 HEMOGLOBIN GLYCOSYLATED A1C: CPT

## 2019-06-21 PROCEDURE — 93015 CV STRESS TEST SUPVJ I&R: CPT | Mod: S$GLB,,, | Performed by: INTERNAL MEDICINE

## 2019-06-21 PROCEDURE — 82306 VITAMIN D 25 HYDROXY: CPT

## 2019-06-21 PROCEDURE — 84439 ASSAY OF FREE THYROXINE: CPT

## 2019-06-22 ENCOUNTER — PATIENT MESSAGE (OUTPATIENT)
Dept: INTERNAL MEDICINE | Facility: CLINIC | Age: 54
End: 2019-06-22

## 2019-06-24 ENCOUNTER — PATIENT MESSAGE (OUTPATIENT)
Dept: INTERNAL MEDICINE | Facility: CLINIC | Age: 54
End: 2019-06-24

## 2019-06-25 ENCOUNTER — PATIENT MESSAGE (OUTPATIENT)
Dept: INTERNAL MEDICINE | Facility: CLINIC | Age: 54
End: 2019-06-25

## 2019-06-28 ENCOUNTER — OCCUPATIONAL HEALTH (OUTPATIENT)
Dept: URGENT CARE | Facility: CLINIC | Age: 54
End: 2019-06-28

## 2019-06-28 DIAGNOSIS — Z02.89 ENCOUNTER FOR EXAMINATION REQUIRED BY DEPARTMENT OF TRANSPORTATION (DOT): Primary | ICD-10-CM

## 2019-06-28 PROCEDURE — 99499 UNLISTED E&M SERVICE: CPT | Mod: S$GLB,,, | Performed by: NURSE PRACTITIONER

## 2019-06-28 PROCEDURE — 99499 PHYSICAL, RECERT DOT/CDL: ICD-10-PCS | Mod: S$GLB,,, | Performed by: NURSE PRACTITIONER

## 2019-07-02 ENCOUNTER — PATIENT MESSAGE (OUTPATIENT)
Dept: INTERNAL MEDICINE | Facility: CLINIC | Age: 54
End: 2019-07-02

## 2019-08-12 DIAGNOSIS — I10 ESSENTIAL HYPERTENSION: ICD-10-CM

## 2019-08-12 DIAGNOSIS — E78.5 HYPERLIPIDEMIA, UNSPECIFIED HYPERLIPIDEMIA TYPE: ICD-10-CM

## 2019-08-12 RX ORDER — TRIAMTERENE AND HYDROCHLOROTHIAZIDE 37.5; 25 MG/1; MG/1
CAPSULE ORAL
Qty: 90 CAPSULE | Refills: 3 | Status: SHIPPED | OUTPATIENT
Start: 2019-08-12 | End: 2020-07-31 | Stop reason: SDUPTHER

## 2019-08-12 RX ORDER — PRAVASTATIN SODIUM 20 MG/1
20 TABLET ORAL DAILY
Qty: 90 TABLET | Refills: 3 | Status: SHIPPED | OUTPATIENT
Start: 2019-08-12 | End: 2020-07-31 | Stop reason: SDUPTHER

## 2019-09-10 DIAGNOSIS — I10 ESSENTIAL HYPERTENSION: ICD-10-CM

## 2019-09-10 RX ORDER — VERAPAMIL HYDROCHLORIDE 240 MG/1
TABLET, FILM COATED, EXTENDED RELEASE ORAL
Qty: 30 TABLET | Refills: 11 | Status: SHIPPED | OUTPATIENT
Start: 2019-09-10 | End: 2020-07-31 | Stop reason: SDUPTHER

## 2019-11-21 ENCOUNTER — OFFICE VISIT (OUTPATIENT)
Dept: URGENT CARE | Facility: CLINIC | Age: 54
End: 2019-11-21
Payer: COMMERCIAL

## 2019-11-21 VITALS
BODY MASS INDEX: 44.96 KG/M2 | DIASTOLIC BLOOD PRESSURE: 82 MMHG | SYSTOLIC BLOOD PRESSURE: 128 MMHG | HEIGHT: 60 IN | WEIGHT: 229 LBS | TEMPERATURE: 98 F | OXYGEN SATURATION: 97 % | RESPIRATION RATE: 16 BRPM | HEART RATE: 59 BPM

## 2019-11-21 DIAGNOSIS — R05.9 COUGH: ICD-10-CM

## 2019-11-21 DIAGNOSIS — J32.9 SINUSITIS, UNSPECIFIED CHRONICITY, UNSPECIFIED LOCATION: Primary | ICD-10-CM

## 2019-11-21 LAB
CTP QC/QA: YES
CTP QC/QA: YES
FLUAV AG NPH QL: NEGATIVE
FLUBV AG NPH QL: NEGATIVE
S PYO RRNA THROAT QL PROBE: NEGATIVE

## 2019-11-21 PROCEDURE — 87880 POCT RAPID STREP A: ICD-10-PCS | Mod: QW,S$GLB,, | Performed by: NURSE PRACTITIONER

## 2019-11-21 PROCEDURE — 87880 STREP A ASSAY W/OPTIC: CPT | Mod: QW,S$GLB,, | Performed by: NURSE PRACTITIONER

## 2019-11-21 PROCEDURE — 99214 OFFICE O/P EST MOD 30 MIN: CPT | Mod: S$GLB,,, | Performed by: NURSE PRACTITIONER

## 2019-11-21 PROCEDURE — 99214 PR OFFICE/OUTPT VISIT, EST, LEVL IV, 30-39 MIN: ICD-10-PCS | Mod: S$GLB,,, | Performed by: NURSE PRACTITIONER

## 2019-11-21 PROCEDURE — 3074F SYST BP LT 130 MM HG: CPT | Mod: CPTII,S$GLB,, | Performed by: NURSE PRACTITIONER

## 2019-11-21 PROCEDURE — 3008F PR BODY MASS INDEX (BMI) DOCUMENTED: ICD-10-PCS | Mod: CPTII,S$GLB,, | Performed by: NURSE PRACTITIONER

## 2019-11-21 PROCEDURE — 87804 INFLUENZA ASSAY W/OPTIC: CPT | Mod: QW,S$GLB,, | Performed by: NURSE PRACTITIONER

## 2019-11-21 PROCEDURE — 87804 POCT INFLUENZA A/B: ICD-10-PCS | Mod: 59,QW,S$GLB, | Performed by: NURSE PRACTITIONER

## 2019-11-21 PROCEDURE — 3079F PR MOST RECENT DIASTOLIC BLOOD PRESSURE 80-89 MM HG: ICD-10-PCS | Mod: CPTII,S$GLB,, | Performed by: NURSE PRACTITIONER

## 2019-11-21 PROCEDURE — 3008F BODY MASS INDEX DOCD: CPT | Mod: CPTII,S$GLB,, | Performed by: NURSE PRACTITIONER

## 2019-11-21 PROCEDURE — 3079F DIAST BP 80-89 MM HG: CPT | Mod: CPTII,S$GLB,, | Performed by: NURSE PRACTITIONER

## 2019-11-21 PROCEDURE — 3074F PR MOST RECENT SYSTOLIC BLOOD PRESSURE < 130 MM HG: ICD-10-PCS | Mod: CPTII,S$GLB,, | Performed by: NURSE PRACTITIONER

## 2019-11-21 RX ORDER — AMOXICILLIN AND CLAVULANATE POTASSIUM 875; 125 MG/1; MG/1
1 TABLET, FILM COATED ORAL 2 TIMES DAILY
Qty: 20 TABLET | Refills: 0 | Status: SHIPPED | OUTPATIENT
Start: 2019-11-21 | End: 2019-12-01

## 2019-11-21 RX ORDER — FLUCONAZOLE 150 MG/1
150 TABLET ORAL DAILY
Qty: 1 TABLET | Refills: 0 | Status: SHIPPED | OUTPATIENT
Start: 2019-11-21 | End: 2019-11-22

## 2019-11-21 RX ORDER — PROMETHAZINE HYDROCHLORIDE AND DEXTROMETHORPHAN HYDROBROMIDE 6.25; 15 MG/5ML; MG/5ML
5 SYRUP ORAL NIGHTLY PRN
Qty: 118 ML | Refills: 0 | Status: SHIPPED | OUTPATIENT
Start: 2019-11-21 | End: 2019-12-01

## 2019-11-21 NOTE — PATIENT INSTRUCTIONS
"Please follow up with your Primary care provider within 2-5 days if your signs and symptoms have not resolved or worsen.  The usual course of cold symptoms are 10-14 days.     If your condition worsens or fails to improve we recommend that you receive another evaluation at the emergency room immediately or contact your primary medical clinic to discuss your concerns.     You must understand that you have received an Urgent Care treatment only and that you may be released before all of your medical problems are known or treated.   You, the patient, will arrange for follow up care as instructed.     Tylenol or Ibuprofen can also be used as directed for pain/fever unless you have an allergy to them or medical condition such as stomach ulcers, kidney or liver disease or blood thinners etc for which you should not be taking these type of medications.     Take over the counter cough medication as directed as needed for cough.  You should avoid medications with pseudoephedrine or phenylephrine (any medication with "D") if you have high blood pressure as this can cause an elevation in your blood pressure. Instead consider Corcidin HBP as needed to prevent an elevated blood pressure.     Natural remedies of symptoms (as needed) include humidification, saline nasal sprays, and/or steamy showers.  Increase fluids, warm tea with honey, cough drops as needed.  You may also use salt water gargles for sore throat.    IF you received a steroid shot today - As discussed, this can elevate your blood pressure, elevate your blood sugar, water weight gain, nervous energy, redness to the face and dimpling of the skin at the injection site.       You have been given Augmentin for an infection today.  Please take all the antibiotic as prescribed on the bottle.      Augmentin is hard on the stomach and should always be taken with food.      Augmentin can cause diarrhea.  As with any antibiotics, use probiotics and/or high culture yogurt " about 2 hours apart from the antibiotic and about 1 week after the antibiotic to replace the gut javier lost with antibiotic use.      If you are female and on BCP use additional methods to prevent pregnancy while on antibiotics and for one cycle after.       Sinusitis (Antibiotic Treatment)    The sinuses are air-filled spaces within the bones of the face. They connect to the inside of the nose. Sinusitis is an inflammation of the tissue lining the sinus cavity. Sinus inflammation can occur during a cold. It can also be due to allergies to pollens and other particles in the air. Sinusitis can cause symptoms of sinus congestion and fullness. A sinus infection causes fever, headache and facial pain. There is often green or yellow drainage from the nose or into the back of the throat (post-nasal drip). You have been given antibiotics to treat this condition.  Home care:  · Take the full course of antibiotics as instructed. Do not stop taking them, even if you feel better.  · Drink plenty of water, hot tea, and other liquids. This may help thin mucus. It also may promote sinus drainage.  · Heat may help soothe painful areas of the face. Use a towel soaked in hot water. Or,  the shower and direct the hot spray onto your face. Using a vaporizer along with a menthol rub at night may also help.   · An expectorant containing guaifenesin may help thin the mucus and promote drainage from the sinuses.  · Over-the-counter decongestants may be used unless a similar medicine was prescribed. Nasal sprays work the fastest. Use one that contains phenylephrine or oxymetazoline. First blow the nose gently. Then use the spray. Do not use these medicines more often than directed on the label or symptoms may get worse. You may also use tablets containing pseudoephedrine. Avoid products that combine ingredients, because side effects may be increased. Read labels. You can also ask the pharmacist for help. (NOTE: Persons with high  blood pressure should not use decongestants. They can raise blood pressure.)  · Over-the-counter antihistamines may help if allergies contributed to your sinusitis.    · Do not use nasal rinses or irrigation during an acute sinus infection, unless told to by your health care provider. Rinsing may spread the infection to other sinuses.  · Use acetaminophen or ibuprofen to control pain, unless another pain medicine was prescribed. (If you have chronic liver or kidney disease or ever had a stomach ulcer, talk with your doctor before using these medicines. Aspirin should never be used in anyone under 18 years of age who is ill with a fever. It may cause severe liver damage.)  · Don't smoke. This can worsen symptoms.  Follow-up care  Follow up with your healthcare provider or our staff if you are not improving within the next week.  When to seek medical advice  Call your healthcare provider if any of these occur:  · Facial pain or headache becoming more severe  · Stiff neck  · Unusual drowsiness or confusion  · Swelling of the forehead or eyelids  · Vision problems, including blurred or double vision  · Fever of 100.4ºF (38ºC) or higher, or as directed by your healthcare provider  · Seizure  · Breathing problems  · Symptoms not resolving within 10 days  Date Last Reviewed: 4/13/2015  © 4483-1962 [employer]. [address] [place] [address], [place] [address]. All rights reserved. This information is not intended as a substitute for professional medical care. Always follow your healthcare professional's instructions.

## 2019-11-21 NOTE — PROGRESS NOTES
Subjective:       Patient ID: Matt Lomax is a 53 y.o. female.    Vitals:  height is 5' (1.524 m) and weight is 103.9 kg (229 lb). Her tympanic temperature is 97.6 °F (36.4 °C). Her blood pressure is 128/82 and her pulse is 59 (abnormal). Her respiration is 16 and oxygen saturation is 97%.     Chief Complaint: Sore Throat    Sore Throat    This is a new problem. Episode onset: 5 days. The problem has been gradually worsening. The pain is at a severity of 5/10. The pain is moderate. Associated symptoms include congestion, coughing, ear pain, headaches and a hoarse voice. Pertinent negatives include no abdominal pain, diarrhea, drooling, ear discharge, plugged ear sensation, neck pain, shortness of breath, stridor, swollen glands, trouble swallowing or vomiting. Treatments tried: cough drops. The treatment provided no relief.       Constitution: Positive for chills. Negative for sweating, fatigue and fever.   HENT: Positive for ear pain, congestion and sore throat. Negative for ear discharge, drooling, sinus pain, sinus pressure, trouble swallowing and voice change.    Neck: Negative for neck pain and painful lymph nodes.   Eyes: Negative for eye redness.   Respiratory: Positive for cough and sputum production. Negative for chest tightness, bloody sputum, COPD, shortness of breath, stridor, wheezing and asthma.    Gastrointestinal: Negative for abdominal pain, nausea, vomiting and diarrhea.   Musculoskeletal: Negative for muscle ache.   Skin: Negative for rash.   Allergic/Immunologic: Negative for seasonal allergies and asthma.   Neurological: Positive for headaches.   Hematologic/Lymphatic: Negative for swollen lymph nodes.       Objective:      Physical Exam   Constitutional: She is oriented to person, place, and time. She appears well-developed and well-nourished. She is cooperative.  Non-toxic appearance. She does not have a sickly appearance. She does not appear ill. No distress.   HENT:   Head:  Normocephalic and atraumatic.   Right Ear: Hearing, tympanic membrane, external ear and ear canal normal.   Left Ear: Hearing, tympanic membrane, external ear and ear canal normal.   Nose: Mucosal edema and rhinorrhea present. No nasal deformity. No epistaxis. Right sinus exhibits frontal sinus tenderness. Right sinus exhibits no maxillary sinus tenderness. Left sinus exhibits frontal sinus tenderness. Left sinus exhibits no maxillary sinus tenderness.   Mouth/Throat: Uvula is midline and mucous membranes are normal. No trismus in the jaw. Normal dentition. No uvula swelling. Posterior oropharyngeal erythema present. No oropharyngeal exudate or posterior oropharyngeal edema.   Eyes: Conjunctivae and lids are normal. No scleral icterus.   Neck: Trachea normal, full passive range of motion without pain and phonation normal. Neck supple. No neck rigidity. No edema and no erythema present.   Cardiovascular: Normal rate, regular rhythm, normal heart sounds, intact distal pulses and normal pulses.   Pulmonary/Chest: Effort normal and breath sounds normal. No respiratory distress. She has no decreased breath sounds. She has no rhonchi.   Musculoskeletal: Normal range of motion. She exhibits no edema or deformity.   Lymphadenopathy:     She has cervical adenopathy.        Right cervical: Superficial cervical adenopathy present. No deep cervical and no posterior cervical adenopathy present.       Left cervical: Superficial cervical adenopathy present. No deep cervical and no posterior cervical adenopathy present.   Neurological: She is alert and oriented to person, place, and time. She exhibits normal muscle tone. Coordination normal.   Skin: Skin is warm, dry, intact, not diaphoretic and not pale.   Psychiatric: She has a normal mood and affect. Her speech is normal and behavior is normal. Judgment and thought content normal. Cognition and memory are normal.   Nursing note and vitals reviewed.        Assessment:       1.  Sinusitis, unspecified chronicity, unspecified location    2. Cough        Plan:       Results for orders placed or performed in visit on 11/21/19   POCT Influenza A/B   Result Value Ref Range    Rapid Influenza A Ag Negative Negative    Rapid Influenza B Ag Negative Negative     Acceptable Yes    POCT rapid strep A   Result Value Ref Range    Rapid Strep A Screen Negative Negative     Acceptable Yes        Sinusitis, unspecified chronicity, unspecified location  -     POCT rapid strep A  -     amoxicillin-clavulanate 875-125mg (AUGMENTIN) 875-125 mg per tablet; Take 1 tablet by mouth 2 (two) times daily. for 10 days  Dispense: 20 tablet; Refill: 0    Cough  -     POCT Influenza A/B  -     promethazine-dextromethorphan (PROMETHAZINE-DM) 6.25-15 mg/5 mL Syrp; Take 5 mLs by mouth nightly as needed.  Dispense: 118 mL; Refill: 0    Other orders  -     fluconazole (DIFLUCAN) 150 MG Tab; Take 1 tablet (150 mg total) by mouth once daily. for 1 day  Dispense: 1 tablet; Refill: 0      Patient Instructions   Please follow up with your Primary care provider within 2-5 days if your signs and symptoms have not resolved or worsen.  The usual course of cold symptoms are 10-14 days.     If your condition worsens or fails to improve we recommend that you receive another evaluation at the emergency room immediately or contact your primary medical clinic to discuss your concerns.     You must understand that you have received an Urgent Care treatment only and that you may be released before all of your medical problems are known or treated.   You, the patient, will arrange for follow up care as instructed.     Tylenol or Ibuprofen can also be used as directed for pain/fever unless you have an allergy to them or medical condition such as stomach ulcers, kidney or liver disease or blood thinners etc for which you should not be taking these type of medications.     Take over the counter cough medication as  "directed as needed for cough.  You should avoid medications with pseudoephedrine or phenylephrine (any medication with "D") if you have high blood pressure as this can cause an elevation in your blood pressure. Instead consider Corcidin HBP as needed to prevent an elevated blood pressure.     Natural remedies of symptoms (as needed) include humidification, saline nasal sprays, and/or steamy showers.  Increase fluids, warm tea with honey, cough drops as needed.  You may also use salt water gargles for sore throat.    IF you received a steroid shot today - As discussed, this can elevate your blood pressure, elevate your blood sugar, water weight gain, nervous energy, redness to the face and dimpling of the skin at the injection site.       You have been given Augmentin for an infection today.  Please take all the antibiotic as prescribed on the bottle.      Augmentin is hard on the stomach and should always be taken with food.      Augmentin can cause diarrhea.  As with any antibiotics, use probiotics and/or high culture yogurt about 2 hours apart from the antibiotic and about 1 week after the antibiotic to replace the gut javier lost with antibiotic use.      If you are female and on BCP use additional methods to prevent pregnancy while on antibiotics and for one cycle after.       Sinusitis (Antibiotic Treatment)    The sinuses are air-filled spaces within the bones of the face. They connect to the inside of the nose. Sinusitis is an inflammation of the tissue lining the sinus cavity. Sinus inflammation can occur during a cold. It can also be due to allergies to pollens and other particles in the air. Sinusitis can cause symptoms of sinus congestion and fullness. A sinus infection causes fever, headache and facial pain. There is often green or yellow drainage from the nose or into the back of the throat (post-nasal drip). You have been given antibiotics to treat this condition.  Home care:  · Take the full course of " antibiotics as instructed. Do not stop taking them, even if you feel better.  · Drink plenty of water, hot tea, and other liquids. This may help thin mucus. It also may promote sinus drainage.  · Heat may help soothe painful areas of the face. Use a towel soaked in hot water. Or,  the shower and direct the hot spray onto your face. Using a vaporizer along with a menthol rub at night may also help.   · An expectorant containing guaifenesin may help thin the mucus and promote drainage from the sinuses.  · Over-the-counter decongestants may be used unless a similar medicine was prescribed. Nasal sprays work the fastest. Use one that contains phenylephrine or oxymetazoline. First blow the nose gently. Then use the spray. Do not use these medicines more often than directed on the label or symptoms may get worse. You may also use tablets containing pseudoephedrine. Avoid products that combine ingredients, because side effects may be increased. Read labels. You can also ask the pharmacist for help. (NOTE: Persons with high blood pressure should not use decongestants. They can raise blood pressure.)  · Over-the-counter antihistamines may help if allergies contributed to your sinusitis.    · Do not use nasal rinses or irrigation during an acute sinus infection, unless told to by your health care provider. Rinsing may spread the infection to other sinuses.  · Use acetaminophen or ibuprofen to control pain, unless another pain medicine was prescribed. (If you have chronic liver or kidney disease or ever had a stomach ulcer, talk with your doctor before using these medicines. Aspirin should never be used in anyone under 18 years of age who is ill with a fever. It may cause severe liver damage.)  · Don't smoke. This can worsen symptoms.  Follow-up care  Follow up with your healthcare provider or our staff if you are not improving within the next week.  When to seek medical advice  Call your healthcare provider if any of  these occur:  · Facial pain or headache becoming more severe  · Stiff neck  · Unusual drowsiness or confusion  · Swelling of the forehead or eyelids  · Vision problems, including blurred or double vision  · Fever of 100.4ºF (38ºC) or higher, or as directed by your healthcare provider  · Seizure  · Breathing problems  · Symptoms not resolving within 10 days  Date Last Reviewed: 4/13/2015  © 7365-2891 [employer]. [address] [place] [address], [place] [address]. All rights reserved. This information is not intended as a substitute for professional medical care. Always follow your healthcare professional's instructions.

## 2019-12-09 ENCOUNTER — TELEPHONE (OUTPATIENT)
Dept: NEUROSURGERY | Facility: CLINIC | Age: 54
End: 2019-12-09

## 2019-12-09 DIAGNOSIS — D35.2 PITUITARY ADENOMA: Primary | ICD-10-CM

## 2020-01-14 ENCOUNTER — PATIENT MESSAGE (OUTPATIENT)
Dept: NEUROSURGERY | Facility: CLINIC | Age: 55
End: 2020-01-14

## 2020-01-24 DIAGNOSIS — E22.1 HYPERPROLACTINEMIA: Primary | ICD-10-CM

## 2020-01-24 RX ORDER — HYDROCORTISONE 10 MG/1
TABLET ORAL
Qty: 135 TABLET | Refills: 2 | Status: SHIPPED | OUTPATIENT
Start: 2020-01-24 | End: 2020-12-21

## 2020-01-31 ENCOUNTER — PATIENT MESSAGE (OUTPATIENT)
Dept: ENDOCRINOLOGY | Facility: CLINIC | Age: 55
End: 2020-01-31

## 2020-02-02 NOTE — TELEPHONE ENCOUNTER
Spoke with pt regarding recent labs ordered by Dr. Willis. Will proceed with ordering formal visual field testing.  
Gen: No fever, normal appetite  Eyes: No eye irritation or discharge  ENT: No ear pain, congestion, sore throat  Resp: No cough or trouble breathing  Cardiovascular: + chest pain  Gastroenteric: + abdominal pain, nausea   :  No change in urine output; no dysuria  MS: No joint or muscle pain  Skin: No rashes  Neuro: No headache; no abnormal movements  Remainder negative, except as per the HPI

## 2020-02-10 ENCOUNTER — PATIENT OUTREACH (OUTPATIENT)
Dept: ADMINISTRATIVE | Facility: OTHER | Age: 55
End: 2020-02-10

## 2020-02-10 DIAGNOSIS — Z12.31 ENCOUNTER FOR SCREENING MAMMOGRAM FOR MALIGNANT NEOPLASM OF BREAST: ICD-10-CM

## 2020-02-10 DIAGNOSIS — Z12.31 ENCOUNTER FOR MAMMOGRAM TO ESTABLISH BASELINE MAMMOGRAM: ICD-10-CM

## 2020-02-10 DIAGNOSIS — E11.9 DIABETES MELLITUS WITHOUT COMPLICATION: Primary | ICD-10-CM

## 2020-02-10 NOTE — PROGRESS NOTES
Chart reviewed.   Immunizations: updated  Orders placed: Mammogram and DM eye exam  Upcoming appts to satisfy SHANIKA topics: 02/14/2020 Endo appt with Dr. Lazarus Clark previously ordered

## 2020-02-14 ENCOUNTER — IMMUNIZATION (OUTPATIENT)
Dept: PHARMACY | Facility: CLINIC | Age: 55
End: 2020-02-14
Payer: COMMERCIAL

## 2020-02-14 ENCOUNTER — OFFICE VISIT (OUTPATIENT)
Dept: ENDOCRINOLOGY | Facility: CLINIC | Age: 55
End: 2020-02-14
Payer: COMMERCIAL

## 2020-02-14 VITALS
RESPIRATION RATE: 18 BRPM | SYSTOLIC BLOOD PRESSURE: 150 MMHG | WEIGHT: 218.69 LBS | HEIGHT: 60 IN | HEART RATE: 68 BPM | DIASTOLIC BLOOD PRESSURE: 84 MMHG | BODY MASS INDEX: 42.94 KG/M2

## 2020-02-14 DIAGNOSIS — E03.9 HYPOTHYROIDISM, UNSPECIFIED TYPE: ICD-10-CM

## 2020-02-14 DIAGNOSIS — E27.49 SECONDARY ADRENAL INSUFFICIENCY: ICD-10-CM

## 2020-02-14 DIAGNOSIS — D35.2 PITUITARY ADENOMA: ICD-10-CM

## 2020-02-14 DIAGNOSIS — E22.1 HYPERPROLACTINEMIA: ICD-10-CM

## 2020-02-14 DIAGNOSIS — Z86.39 H/O SECONDARY HYPOGONADISM: ICD-10-CM

## 2020-02-14 DIAGNOSIS — E55.9 VITAMIN D DEFICIENCY: ICD-10-CM

## 2020-02-14 DIAGNOSIS — E66.01 OBESITY, MORBID, BMI 40.0-49.9: ICD-10-CM

## 2020-02-14 DIAGNOSIS — G47.33 OSA ON CPAP: ICD-10-CM

## 2020-02-14 DIAGNOSIS — I10 HTN (HYPERTENSION), BENIGN: ICD-10-CM

## 2020-02-14 DIAGNOSIS — E03.8 SECONDARY HYPOTHYROIDISM: Primary | ICD-10-CM

## 2020-02-14 PROCEDURE — 3079F DIAST BP 80-89 MM HG: CPT | Mod: CPTII,S$GLB,, | Performed by: INTERNAL MEDICINE

## 2020-02-14 PROCEDURE — 3008F BODY MASS INDEX DOCD: CPT | Mod: CPTII,S$GLB,, | Performed by: INTERNAL MEDICINE

## 2020-02-14 PROCEDURE — 3008F PR BODY MASS INDEX (BMI) DOCUMENTED: ICD-10-PCS | Mod: CPTII,S$GLB,, | Performed by: INTERNAL MEDICINE

## 2020-02-14 PROCEDURE — 3079F PR MOST RECENT DIASTOLIC BLOOD PRESSURE 80-89 MM HG: ICD-10-PCS | Mod: CPTII,S$GLB,, | Performed by: INTERNAL MEDICINE

## 2020-02-14 PROCEDURE — 3077F PR MOST RECENT SYSTOLIC BLOOD PRESSURE >= 140 MM HG: ICD-10-PCS | Mod: CPTII,S$GLB,, | Performed by: INTERNAL MEDICINE

## 2020-02-14 PROCEDURE — 99214 PR OFFICE/OUTPT VISIT, EST, LEVL IV, 30-39 MIN: ICD-10-PCS | Mod: S$GLB,,, | Performed by: INTERNAL MEDICINE

## 2020-02-14 PROCEDURE — 99999 PR PBB SHADOW E&M-EST. PATIENT-LVL IV: CPT | Mod: PBBFAC,,, | Performed by: INTERNAL MEDICINE

## 2020-02-14 PROCEDURE — 99214 OFFICE O/P EST MOD 30 MIN: CPT | Mod: S$GLB,,, | Performed by: INTERNAL MEDICINE

## 2020-02-14 PROCEDURE — 99999 PR PBB SHADOW E&M-EST. PATIENT-LVL IV: ICD-10-PCS | Mod: PBBFAC,,, | Performed by: INTERNAL MEDICINE

## 2020-02-14 PROCEDURE — 3077F SYST BP >= 140 MM HG: CPT | Mod: CPTII,S$GLB,, | Performed by: INTERNAL MEDICINE

## 2020-02-14 RX ORDER — LEVOTHYROXINE SODIUM 75 UG/1
75 TABLET ORAL
Qty: 90 TABLET | Refills: 3 | Status: SHIPPED | OUTPATIENT
Start: 2020-02-14 | End: 2021-02-17 | Stop reason: SDUPTHER

## 2020-02-14 NOTE — ASSESSMENT & PLAN NOTE
Encouraged efforts at weight loss and she is making  Focus on regular meals rather than high calorie snacks as she is doing now

## 2020-02-14 NOTE — ASSESSMENT & PLAN NOTE
BP mildly elevated today but she reports better values when checked at home  Cont current medications

## 2020-02-14 NOTE — ASSESSMENT & PLAN NOTE
S/p resection in 2014  Stable imaging in 1/2018 with repeat planned this year  Management of panhypopit as below

## 2020-02-14 NOTE — ASSESSMENT & PLAN NOTE
On adequate replacement dose. Suspect fatigue related primarily to untreated JUAN as improves on nights she uses CPAP  Cont HC 10/5 mg

## 2020-02-14 NOTE — PATIENT INSTRUCTIONS
Labs in June    Bone density scan in next few weeks    Start taking vitamin D3 1000 units daily  
normal...

## 2020-02-14 NOTE — PROGRESS NOTES
Matt Lomax is a 54 y.o. female with HTN, HLD, JUAN presenting for follow-up of pituitary macroadenoma s/p resection, secondary AI, hypothyroidism, elevated prolactin    History of Present Illness  Diagnosed with macroadenoma, possible prolactinoma, ~ in 2006 and started on cabergoline at that time due to elevated prolactin (highest value I can find is 67).  Monitored with MRI with increase in size over time so she was referred to  and she had surgery in 7/2014.       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     Secondary adrenal insufficiency   HC 10-0-5-0  She denies any dizziness, nausea, vomiting, lightheadedness. Appetite ok but eats snacks throughout the day so not always hungry for meals.   Energy low particularly in the morning when does not wear CPAP. Feels better after nights she uses CPAP  +intentional weight loss     Secondary hypothyroidism  On levothyroxine 75 mcg daily. Takes on empty stomach separate from other meds.  Free T4 at goal 6/2019    Weight loss as above.  Regular bm's. + dry skin. No excessive hair loss. No tremors or palpitations.      No cycles since day of surgery  Occasional hot flashes  No previous DXA     Optometry visit 11/2018 - no retinopathy  Denies headache or vision change  Seen by [facility] 1/2018. Due for repeat MRI and visit now but difficulty in getting MRI due to cost. Will save and try to do later in the year once meets deductible    Denies headache, vision change     HbA1c improved and metformin d/c by PCP in summer 2017     Has hypertension on dyazide and verapamil with stable blood pressure.      Vit d insufficiency -off supplement for past few months         Current Outpatient Medications:     hydrocortisone (CORTEF) 10 MG Tab, TAKE 1 TABLET (10MG) WITH BREAKFAST AND 1/2 TABLET (5 MG) WITH DINNER, Disp: 135 tablet, Rfl: 2    ibuprofen  (ADVIL,MOTRIN) 600 MG tablet, Take 1 tablet (600 mg total) by mouth every 6 (six) hours as needed for Pain., Disp: 20 tablet, Rfl: 0    levothyroxine (SYNTHROID) 75 MCG tablet, Take 1 tablet (75 mcg total) by mouth before breakfast., Disp: 90 tablet, Rfl: 3    multivitamin (THERAGRAN) per tablet, Take 1 tablet by mouth every morning. , Disp: , Rfl:     mupirocin (BACTROBAN) 2 % ointment, Apply topically 2 (two) times daily., Disp: 15 g, Rfl: 0    pravastatin (PRAVACHOL) 20 MG tablet, TAKE 1 TABLET (20 MG TOTAL) BY MOUTH ONCE DAILY. FOR CHOLESTEROL., Disp: 90 tablet, Rfl: 3    triamterene-hydrochlorothiazide 37.5-25 mg (DYAZIDE) 37.5-25 mg per capsule, TAKE 1 CAPSULE BY MOUTH EVERY DAY, Disp: 90 capsule, Rfl: 3    verapamil (CALAN-SR) 240 MG CR tablet, TAKE 1 TABLET (240 MG TOTAL) BY MOUTH ONCE DAILY., Disp: 30 tablet, Rfl: 11    conjugated estrogens (PREMARIN) vaginal cream, Use 1 gram nightly 2- 3 times per week (Patient not taking: Reported on 11/21/2019), Disp: 30 g, Rfl: 5    flu vacc [id number] 6mos up,PF, (FLUARIX QUAD [id number], PF,) 60 mcg (15 mcg x 4)/0.5 mL Syrg, Inject 0.5 mLs into the muscle once. For one dose. for 1 dose, Disp: 0.5 mL, Rfl: 0    zolpidem (AMBIEN) 5 MG Tab, Take 1 tablet (5 mg total) by mouth nightly as needed (Insomnia.). (Patient not taking: Reported on 11/21/2019), Disp: 20 tablet, Rfl: 1    Review of Systems   Constitutional: Negative for activity change and unexpected weight change.   Respiratory: Negative for shortness of breath.    Cardiovascular: Negative for chest pain and leg swelling.   Gastrointestinal: Negative for abdominal pain.   Genitourinary: Negative for dysuria.   Skin: Negative for rash.   Neurological: Negative for headaches.   Psychiatric/Behavioral: Negative for confusion.       Objective:     Vitals:    02/14/20 1314   BP: (!) 150/84   Pulse: 68   Resp: 18     Wt Readings from Last 3 Encounters:   02/14/20 99.2 kg (218 lb 11.1 oz)   12/11/19 102.1 kg (225  lb)   11/21/19 103.9 kg (229 lb)     Body mass index is 42.71 kg/m².  Physical Exam   Constitutional: She is oriented to person, place, and time. She appears well-developed and well-nourished.   HENT:   Head: Normocephalic.   Eyes: Conjunctivae and EOM are normal.   Pulmonary/Chest: Effort normal.   Musculoskeletal: Normal range of motion. She exhibits no edema.   Neurological: She is alert and oriented to person, place, and time.   Skin: Skin is warm. No rash noted.   +acanthosis       LABS    Chemistry        Component Value Date/Time     06/21/2019 0830    K 3.4 (L) 06/21/2019 0830     06/21/2019 0830    CO2 31 (H) 06/21/2019 0830    BUN 14 06/21/2019 0830    CREATININE 1.0 06/21/2019 0830    GLU 99 06/21/2019 0830        Component Value Date/Time    CALCIUM 9.7 06/21/2019 0830    ALKPHOS 82 06/21/2019 0830    AST 16 06/21/2019 0830    ALT 9 (L) 06/21/2019 0830    BILITOT 0.4 06/21/2019 0830    ESTGFRAFRICA >60 06/21/2019 0830    EGFRNONAA >60 06/21/2019 0830        Lab Results   Component Value Date    HGBA1C 5.7 (H) 06/21/2019     Vit D, 25-Hydroxy   Date Value Ref Range Status   06/21/2019 23 (L) 30 - 96 ng/mL Final     Comment:     Vitamin D deficiency.........<10 ng/mL                              Vitamin D insufficiency......10-29 ng/mL       Vitamin D sufficiency........> or equal to 30 ng/mL  Vitamin D toxicity............>100 ng/mL       Component      Latest Ref Rng & Units 6/21/2019 5/29/2019   Prolactin      5.2 - 26.5 ng/mL  20.0   Cortisol -8 AM      4.30 - 22.40 ug/dL  <1.00 (L)   Free T4      0.71 - 1.51 ng/dL 1.05         IMAGING  MRI 1/2018:Stable postsurgical changes from prior transsphenoidal sellar mass partial resection as described above with findings suggestive of a intraosseous or partially calcified meningioma centered at the level of the dorsum sellae (with pituitary macroadenoma in the differential diagnosis). Residual enhancement appears similar to the postoperative study  of 04/21/2015. No new nodular or mass effect.       Assessment and Plan     Pituitary adenoma  S/p resection in 2014  Stable imaging in 1/2018 with repeat planned this year  Management of panhypopit as below       Secondary adrenal insufficiency  On adequate replacement dose. Suspect fatigue related primarily to untreated JUAN as improves on nights she uses CPAP  Cont HC 10/5 mg      Secondary hypothyroidism  Free T4 at goal  Repeat 6/2020  Cont levothyroxine 75 mcg daily    H/O secondary hypogonadism  Check DXA    Hyperprolactinemia  resolved    HTN (hypertension), benign  BP mildly elevated today but she reports better values when checked at home  Cont current medications    Obesity, morbid, BMI 40.0-49.9  Encouraged efforts at weight loss and she is making  Focus on regular meals rather than high calorie snacks as she is doing now    JUAN on CPAP  Refer to sleep medicine for CPAP titration as none since 2010 per her        RTC 1 year    Trudy Qiu MD

## 2020-02-21 ENCOUNTER — PATIENT MESSAGE (OUTPATIENT)
Dept: SLEEP MEDICINE | Facility: CLINIC | Age: 55
End: 2020-02-21

## 2020-02-24 ENCOUNTER — PATIENT OUTREACH (OUTPATIENT)
Dept: ADMINISTRATIVE | Facility: OTHER | Age: 55
End: 2020-02-24

## 2020-02-24 NOTE — PROGRESS NOTES
Chart reviewed.   Immunizations: Triggered Imm Registry     Orders placed: n/a  Upcoming appts to satisfy SHANIKA topics: Mammo 3/4/2020 A1c 3/11/2020

## 2020-02-26 ENCOUNTER — OFFICE VISIT (OUTPATIENT)
Dept: SLEEP MEDICINE | Facility: CLINIC | Age: 55
End: 2020-02-26
Payer: COMMERCIAL

## 2020-02-26 VITALS
DIASTOLIC BLOOD PRESSURE: 92 MMHG | WEIGHT: 219.13 LBS | HEART RATE: 73 BPM | SYSTOLIC BLOOD PRESSURE: 146 MMHG | HEIGHT: 60 IN | BODY MASS INDEX: 43.02 KG/M2

## 2020-02-26 DIAGNOSIS — E66.01 OBESITY, MORBID, BMI 40.0-49.9: ICD-10-CM

## 2020-02-26 DIAGNOSIS — G47.33 OBSTRUCTIVE SLEEP APNEA: Primary | ICD-10-CM

## 2020-02-26 DIAGNOSIS — I10 HTN (HYPERTENSION), BENIGN: ICD-10-CM

## 2020-02-26 DIAGNOSIS — G47.33 OSA ON CPAP: ICD-10-CM

## 2020-02-26 PROCEDURE — 3008F BODY MASS INDEX DOCD: CPT | Mod: CPTII,S$GLB,, | Performed by: NURSE PRACTITIONER

## 2020-02-26 PROCEDURE — 99213 PR OFFICE/OUTPT VISIT, EST, LEVL III, 20-29 MIN: ICD-10-PCS | Mod: S$GLB,,, | Performed by: NURSE PRACTITIONER

## 2020-02-26 PROCEDURE — 3077F PR MOST RECENT SYSTOLIC BLOOD PRESSURE >= 140 MM HG: ICD-10-PCS | Mod: CPTII,S$GLB,, | Performed by: NURSE PRACTITIONER

## 2020-02-26 PROCEDURE — 3077F SYST BP >= 140 MM HG: CPT | Mod: CPTII,S$GLB,, | Performed by: NURSE PRACTITIONER

## 2020-02-26 PROCEDURE — 99999 PR PBB SHADOW E&M-EST. PATIENT-LVL IV: ICD-10-PCS | Mod: PBBFAC,,, | Performed by: NURSE PRACTITIONER

## 2020-02-26 PROCEDURE — 99999 PR PBB SHADOW E&M-EST. PATIENT-LVL IV: CPT | Mod: PBBFAC,,, | Performed by: NURSE PRACTITIONER

## 2020-02-26 PROCEDURE — 3080F DIAST BP >= 90 MM HG: CPT | Mod: CPTII,S$GLB,, | Performed by: NURSE PRACTITIONER

## 2020-02-26 PROCEDURE — 3008F PR BODY MASS INDEX (BMI) DOCUMENTED: ICD-10-PCS | Mod: CPTII,S$GLB,, | Performed by: NURSE PRACTITIONER

## 2020-02-26 PROCEDURE — 3080F PR MOST RECENT DIASTOLIC BLOOD PRESSURE >= 90 MM HG: ICD-10-PCS | Mod: CPTII,S$GLB,, | Performed by: NURSE PRACTITIONER

## 2020-02-26 PROCEDURE — 99213 OFFICE O/P EST LOW 20 MIN: CPT | Mod: S$GLB,,, | Performed by: NURSE PRACTITIONER

## 2020-02-26 NOTE — LETTER
February 26, 2020      Trudy Qiu MD  [address]  [facility] [address]           [facility] [place] [facility] [address]  [address]  [facility] [address]  Phone: [phone]          Patient: Matt Lomax   MR Number: [id number]   YOB: 1965   Date of Visit: 2/26/2020       Dear Dr. Trudy Qiu:    Thank you for referring Matt Lomax to me for evaluation. Attached you will find relevant portions of my assessment and plan of care.    If you have questions, please do not hesitate to call me. I look forward to following Matt Lomax along with you.    Sincerely,    Britni Emanuel, NP    Enclosure  CC:  No Recipients    If you would like to receive this communication electronically, please contact [email or web address] or [phone] to request more information on [employer] Link access.    For providers and/or their staff who would like to refer a patient to [facility], please contact us through our one-stop-shop provider referral line, [facility], at [phone].    If you feel you have received this communication in error or would no longer like to receive these types of communications, please e-mail [email or web address]

## 2020-02-26 NOTE — PROGRESS NOTES
Matt Lomax was seen as a new patient, referred by Dr. Qiu , for the management of obstructive sleep apnea.     CHIEF COMPLAINT: Snoring, excessive daytime sleepiness    HISTORY OF PRESENT ILLNESS:Matt Lomax a 54 y.o. female presents for the management of obstructive sleep apnea. She was diagnosed with sleep apnea by study done 2011.  Used her cpap 9cm off and on many years. More consistently now. Machine is very old and using nose mask with forehead apparatus. Tired and sleep when not using it. Sleeps alone/. Snoring resolved with  Mask use. +oral drying. Not using chin strap. 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   EPWORTH SLEEPINESS SCALE 2/26/2020   Sitting and reading 1   Watching TV 0   Sitting, inactive in a public place (e.g. a theatre or a meeting) 1   As a passenger in a car for an hour without a break 0   Lying down to rest in the afternoon when circumstances permit 0   Sitting and talking to someone 0   Sitting quietly after a lunch without alcohol 2   In a car, while stopped for a few minutes in traffic 1   Total score 5     Sleep Clinic New Patient 2/26/2020   What time do you go to bed on a week day? (Give a range) 10-11 PM   What time do you go to bed on a day off? (Give a range) 10-11 PM   How long does it take you to fall asleep? (Give a range) 30 min-1 hr   On average, how many times per night do you wake up? 2-3   How long does it take you to fall back into sleep? (Give a range) 30   What time do you wake up to start your day on a week day? (Give a range) 5:30 AM   What time do you wake up to start your day on a day off? (Give a range) 7:00 AM   What time do you get out of bed? (Give a range) 6 AM   On average, how many hours do you sleep? 6-7 hrs   On average, how many naps do you take per day? 0   Rate your sleep quality from 0 to 5 (0-poor, 5-great). 2   Have you experienced:  Weight loss?   How much  weight have you lost or gained (in lbs.) in the last year? N/A   On average, how many times per night do you go to the bathroom?  3   Have you ever had a sleep study/CPAP machine/surgery for sleep apnea? Yes   Have you ever had a CPAP machine for sleep apnea? Yes   Have you ever had surgery for sleep apnea? No           FAMILY HISTORY: son +JUAN    REVIEW OF SYSTEMS:  Sleep related symptoms as per [facility]  Sleep Clinic ROS  2/26/2020   Difficulty breathing through the nose?  No   Sore throat or dry mouth in the morning? Yes   Irregular or very fast heart beat?  No   Shortness of breath?  No   Acid reflux? No   Body aches and pains?  Sometimes   Morning headaches? Sometimes   Dizziness? No   Mood changes?  Sometimes   Do you exercise?  Sometimes   Do you feel like moving your legs a lot?  No         Interrogation- total 473, avg 4.8h/n. Nose mask  PSG (258#) 2/1/11 AHI 24.6/low sat 85% (REM AHI 78), titrated cpap 9cm        PHYSICAL EXAM:   Ht 5' (1.524 m)   Wt 99.4 kg (219 lb 2.2 oz)   LMP 07/21/2014   BMI 42.80 kg/m²   GENERAL: morbid obese body habitus, well groomed   HEENT: Conjunctivae are non-erythematous; Pupils equal, round, and reactive to light; Nose is symmetrical  SKIN: On face and neck: No abrasions, no rashes, no lesions  RESPIRATORY: Normal chest expansion and non-labored breathing at rest.   EXTREMITIES: No edema. No clubbing. No cyanosis. Station normal. Gait normal.   NEURO/PSYCH: Oriented to time, place and person. Normal attention span and concentration. Affect is full. Mood is normal.       ASSESSMENT:     JUAN, moderate, worse during REM. Remains adherent with cpap but machine is old/no AHI capabilityand has lost wgt  She has medical comorbidities of morbid obesity, hypertension, secondary adrenal insufficiency      PLAN:   1.NEW machine apap 6-10m with adherence monitoring, [facility] [employer]   2. Discussed etiology of JUAN and potential ramifications of untreated JUAN, including stroke, heart disease, HTN  3.  The patient was advised to abstain from driving should she feel sleepy or drowsy.       Thank you for allowing me the opportunity to participate in the care of your patient

## 2020-02-28 ENCOUNTER — OFFICE VISIT (OUTPATIENT)
Dept: INTERNAL MEDICINE | Facility: CLINIC | Age: 55
End: 2020-02-28
Payer: COMMERCIAL

## 2020-02-28 VITALS
HEART RATE: 60 BPM | BODY MASS INDEX: 43.33 KG/M2 | DIASTOLIC BLOOD PRESSURE: 78 MMHG | OXYGEN SATURATION: 97 % | HEIGHT: 60 IN | TEMPERATURE: 99 F | SYSTOLIC BLOOD PRESSURE: 126 MMHG | WEIGHT: 220.69 LBS

## 2020-02-28 DIAGNOSIS — F32.A DEPRESSION, UNSPECIFIED DEPRESSION TYPE: ICD-10-CM

## 2020-02-28 DIAGNOSIS — G47.00 INSOMNIA, UNSPECIFIED TYPE: ICD-10-CM

## 2020-02-28 DIAGNOSIS — F41.8 SITUATIONAL ANXIETY: ICD-10-CM

## 2020-02-28 PROCEDURE — 99999 PR PBB SHADOW E&M-EST. PATIENT-LVL IV: ICD-10-PCS | Mod: PBBFAC,,, | Performed by: FAMILY MEDICINE

## 2020-02-28 PROCEDURE — 3074F PR MOST RECENT SYSTOLIC BLOOD PRESSURE < 130 MM HG: ICD-10-PCS | Mod: CPTII,S$GLB,, | Performed by: FAMILY MEDICINE

## 2020-02-28 PROCEDURE — 99213 PR OFFICE/OUTPT VISIT, EST, LEVL III, 20-29 MIN: ICD-10-PCS | Mod: S$GLB,,, | Performed by: FAMILY MEDICINE

## 2020-02-28 PROCEDURE — 99999 PR PBB SHADOW E&M-EST. PATIENT-LVL IV: CPT | Mod: PBBFAC,,, | Performed by: FAMILY MEDICINE

## 2020-02-28 PROCEDURE — 99213 OFFICE O/P EST LOW 20 MIN: CPT | Mod: S$GLB,,, | Performed by: FAMILY MEDICINE

## 2020-02-28 PROCEDURE — 3078F DIAST BP <80 MM HG: CPT | Mod: CPTII,S$GLB,, | Performed by: FAMILY MEDICINE

## 2020-02-28 PROCEDURE — 3074F SYST BP LT 130 MM HG: CPT | Mod: CPTII,S$GLB,, | Performed by: FAMILY MEDICINE

## 2020-02-28 PROCEDURE — 3078F PR MOST RECENT DIASTOLIC BLOOD PRESSURE < 80 MM HG: ICD-10-PCS | Mod: CPTII,S$GLB,, | Performed by: FAMILY MEDICINE

## 2020-02-28 PROCEDURE — 3008F BODY MASS INDEX DOCD: CPT | Mod: CPTII,S$GLB,, | Performed by: FAMILY MEDICINE

## 2020-02-28 PROCEDURE — 3008F PR BODY MASS INDEX (BMI) DOCUMENTED: ICD-10-PCS | Mod: CPTII,S$GLB,, | Performed by: FAMILY MEDICINE

## 2020-02-28 RX ORDER — ZOLPIDEM TARTRATE 5 MG/1
5 TABLET ORAL NIGHTLY PRN
Qty: 30 TABLET | Refills: 0 | Status: SHIPPED | OUTPATIENT
Start: 2020-02-28 | End: 2022-03-31 | Stop reason: SDUPTHER

## 2020-02-28 RX ORDER — HYDROXYZINE PAMOATE 25 MG/1
25 CAPSULE ORAL EVERY 8 HOURS PRN
Qty: 30 CAPSULE | Refills: 2 | Status: SHIPPED | OUTPATIENT
Start: 2020-02-28 | End: 2021-02-17

## 2020-02-28 NOTE — PROGRESS NOTES
"Subjective:      Patient ID: Matt Lomax is a 54 y.o. female.    Chief Complaint: Anxiety and Depression      HPI:  Matt Lomax is a 54 year old female with history of secondary hypogonadism, hyperlipidemia, hyperprolactinemia, hypertension, hypothyroidism, impaired glucose tolerance, morbid obesity, JUAN on CPAP, pituitary adenoma, and secondary adrenal insufficiency who presents to clinic today with a chief complaint of anxiety and depression.    States she may be having bouts of depression after going through a divorce.  States sometimes she does not feel like getting out of bed.  States she has noticed depressive symptoms for the past couple of months.  States her symptoms are stable.  Denies associated HI/SI.  Endorses decreased motivation.  States she doesn't talk about her symptoms with others much but just tries to push through her symptoms.  Endorses associated decreased appetite.  Would like to see a therapist.    States she is currently in school.  States when she take tests she "blanks out."  Endorses anxiety associated with this.  States her anxiety causes her to forget things.      Requests refills on Ambien.  Uses intermittently.        Past Medical History:   Diagnosis Date    HTN (hypertension)     Hyperprolactinemia     Morbid obesity     JUAN (obstructive sleep apnea)     Pituitary adenoma     Secondary hypothyroidism 2015    Type II or unspecified type diabetes mellitus without mention of complication, uncontrolled     Vitamin D deficiency disease        Past Surgical History:   Procedure Laterality Date    Breast Reduction       SECTION      x 3    COLONOSCOPY N/A 2017    Procedure: COLONOSCOPY;  Surgeon: Ciro Rebolledo MD;  Location: [address]);  Service: Endoscopy;  Laterality: N/A;    TOTAL REDUCTION MAMMOPLASTY      TRANSPHENOIDAL PITUITARY RESECTION  14    TUBAL LIGATION         Family History   Problem Relation Age of Onset    " Diabetes Mother     Hypertension Mother     Heart disease Mother     Hypertension Father     Diabetes Maternal Aunt     No Known Problems Brother     No Known Problems Daughter     No Known Problems Son     No Known Problems Brother     No Known Problems Daughter     No Known Problems Sister     No Known Problems Maternal Uncle     No Known Problems Paternal Aunt     No Known Problems Paternal Uncle     No Known Problems Maternal Grandmother     No Known Problems Maternal Grandfather     No Known Problems Paternal Grandmother     No Known Problems Paternal Grandfather     Breast cancer Neg Hx     Colon cancer Neg Hx     Ovarian cancer Neg Hx     Amblyopia Neg Hx     Blindness Neg Hx     Cancer Neg Hx     Cataracts Neg Hx     Glaucoma Neg Hx     Macular degeneration Neg Hx     Retinal detachment Neg Hx     Strabismus Neg Hx     Stroke Neg Hx     Thyroid disease Neg Hx        Social History     Socioeconomic History    Marital status:      Spouse name: Not on file    Number of children: Not on file    Years of education: Not on file    Highest education level: Not on file   Occupational History    Occupation:      Employer: [employer]   Social Needs    Financial resource strain: Not on file    Food insecurity:     Worry: Not on file     Inability: Not on file    Transportation needs:     Medical: Not on file     Non-medical: Not on file   Tobacco Use    Smoking status: Never Smoker    Smokeless tobacco: Never Used   Substance and Sexual Activity    Alcohol use: No    Drug use: No    Sexual activity: Not Currently     Partners: Male     Birth control/protection: Surgical, Post-menopausal   Lifestyle    Physical activity:     Days per week: Not on file     Minutes per session: Not on file    Stress: Not on file   Relationships    Social connections:     Talks on phone: Not on file     Gets together: Not on file     Attends [employer] service:  Not on file     Active member of club or organization: Not on file     Attends meetings of clubs or organizations: Not on file     Relationship status: Not on file   Other Topics Concern    Not on file   Social History Narrative    Not on file       Review of Systems   Constitutional: Negative for chills, fatigue and fever.   HENT: Negative for congestion, hearing loss, nosebleeds, rhinorrhea, sore throat and trouble swallowing.    Eyes: Negative for pain and visual disturbance.   Respiratory: Negative for cough, shortness of breath and wheezing.    Cardiovascular: Negative for chest pain and palpitations.   Gastrointestinal: Negative for abdominal distention, abdominal pain, constipation, diarrhea, nausea and vomiting.   Genitourinary: Negative for decreased urine volume, difficulty urinating, dysuria, hematuria and urgency.   Musculoskeletal: Negative for arthralgias, back pain and myalgias.   Skin: Negative for color change and rash.   Neurological: Negative for dizziness, tremors, weakness, light-headedness, numbness and headaches.   Psychiatric/Behavioral: Positive for dysphoric mood. Negative for agitation, behavioral problems and confusion. The patient is nervous/anxious.      Objective:     Vitals:    02/28/20 1054   BP: 126/78   BP Location: Left arm   Patient Position: Sitting   BP Method: Large (Manual)   Pulse: 60   Temp: 98.7 °F (37.1 °C)   TempSrc: Oral   SpO2: 97%   Weight: 100.1 kg (220 lb 10.9 oz)   Height: 5' (1.524 m)       Physical Exam   Constitutional: She appears well-developed and well-nourished.   HENT:   Head: Normocephalic and atraumatic.   Right Ear: External ear normal.   Left Ear: External ear normal.   Nose: Nose normal.   Mouth/Throat: Oropharynx is clear and moist.   Eyes: Pupils are equal, round, and reactive to light. Conjunctivae are normal.   Neck: Neck supple. No tracheal deviation present.   Cardiovascular: Normal rate, regular rhythm and normal heart sounds. Exam reveals no  gallop and no friction rub.   No murmur heard.  Pulmonary/Chest: Effort normal and breath sounds normal. No respiratory distress. She has no wheezes. She has no rales.   Abdominal: Soft. Bowel sounds are normal. She exhibits no distension. There is no tenderness. There is no rebound and no guarding.   Musculoskeletal: She exhibits no edema or deformity.   Neurological: She is alert.   Skin: Skin is warm and dry.   Psychiatric: She has a normal mood and affect. Her behavior is normal.   Nursing note and vitals reviewed.     Assessment:      1. Situational anxiety    2. Depression, unspecified depression type    3. Insomnia, unspecified type      Plan:   Matt was seen today for anxiety and depression.    Diagnoses and all orders for this visit:    Situational anxiety  -     hydrOXYzine pamoate (VISTARIL) 25 MG Cap; Take 1 capsule (25 mg total) by mouth every 8 (eight) hours as needed (anxiety).  Counseled patient on potential adverse effects.  To notify me if no improvement or for any adverse effects.  -     Ambulatory referral/consult to Psychology; Future    Depression, unspecified depression type  -     Ambulatory referral/consult to Psychology; Future  -     To notify me if no improvement with psychology; offered Rx today but patient prefers to wait, consider bupropion if patient amenable in the future    Insomnia, unspecified type  -     Stable continue zolpidem (AMBIEN) 5 MG Tab; Take 1 tablet (5 mg total) by mouth nightly as needed (Insomnia.), refilled.

## 2020-03-03 ENCOUNTER — PATIENT OUTREACH (OUTPATIENT)
Dept: ADMINISTRATIVE | Facility: OTHER | Age: 55
End: 2020-03-03

## 2020-03-03 NOTE — PROGRESS NOTES
Chart reviewed.   Immunizations: updated  Orders placed: n/a  Upcoming appts to satisfy SHANIKA topics: Mammogram, Optometry 3/04, Hemoglobin A1c 3/11

## 2020-03-04 ENCOUNTER — OFFICE VISIT (OUTPATIENT)
Dept: OPTOMETRY | Facility: CLINIC | Age: 55
End: 2020-03-04
Payer: COMMERCIAL

## 2020-03-04 ENCOUNTER — HOSPITAL ENCOUNTER (OUTPATIENT)
Dept: RADIOLOGY | Facility: HOSPITAL | Age: 55
Discharge: HOME | End: 2020-03-04
Attending: FAMILY MEDICINE
Payer: COMMERCIAL

## 2020-03-04 DIAGNOSIS — H25.13 NUCLEAR SCLEROSIS OF BOTH EYES: ICD-10-CM

## 2020-03-04 DIAGNOSIS — H35.033 HYPERTENSIVE RETINOPATHY OF BOTH EYES: Primary | ICD-10-CM

## 2020-03-04 DIAGNOSIS — D35.2 PITUITARY ADENOMA: ICD-10-CM

## 2020-03-04 DIAGNOSIS — H04.123 BILATERAL DRY EYES: ICD-10-CM

## 2020-03-04 DIAGNOSIS — H52.7 REFRACTIVE ERROR: ICD-10-CM

## 2020-03-04 DIAGNOSIS — Z12.31 ENCOUNTER FOR SCREENING MAMMOGRAM FOR MALIGNANT NEOPLASM OF BREAST: ICD-10-CM

## 2020-03-04 PROCEDURE — 77063 MAMMO DIGITAL SCREENING BILAT WITH TOMOSYNTHESIS_CAD: ICD-10-PCS | Mod: 26,,, | Performed by: RADIOLOGY

## 2020-03-04 PROCEDURE — 77067 SCR MAMMO BI INCL CAD: CPT | Mod: TC

## 2020-03-04 PROCEDURE — 77063 BREAST TOMOSYNTHESIS BI: CPT | Mod: 26,,, | Performed by: RADIOLOGY

## 2020-03-04 PROCEDURE — 92014 COMPRE OPH EXAM EST PT 1/>: CPT | Mod: S$GLB,,, | Performed by: OPTOMETRIST

## 2020-03-04 PROCEDURE — 92014 PR EYE EXAM, EST PATIENT,COMPREHESV: ICD-10-PCS | Mod: S$GLB,,, | Performed by: OPTOMETRIST

## 2020-03-04 PROCEDURE — 77067 SCR MAMMO BI INCL CAD: CPT | Mod: 26,,, | Performed by: RADIOLOGY

## 2020-03-04 PROCEDURE — 99999 PR PBB SHADOW E&M-EST. PATIENT-LVL II: CPT | Mod: PBBFAC,,, | Performed by: OPTOMETRIST

## 2020-03-04 PROCEDURE — 99999 PR PBB SHADOW E&M-EST. PATIENT-LVL II: ICD-10-PCS | Mod: PBBFAC,,, | Performed by: OPTOMETRIST

## 2020-03-04 PROCEDURE — 92015 PR REFRACTION: ICD-10-PCS | Mod: S$GLB,,, | Performed by: OPTOMETRIST

## 2020-03-04 PROCEDURE — 77067 MAMMO DIGITAL SCREENING BILAT WITH TOMOSYNTHESIS_CAD: ICD-10-PCS | Mod: 26,,, | Performed by: RADIOLOGY

## 2020-03-04 PROCEDURE — 92015 DETERMINE REFRACTIVE STATE: CPT | Mod: S$GLB,,, | Performed by: OPTOMETRIST

## 2020-03-04 NOTE — PROGRESS NOTES
HPI     Ms. Matt Lomax was referred by self for a Dr puckett    Patient complains of decreased vision distance sometimes she has problems   with glare when reading at home.    Would patient like a refraction today? yes     Paitent denies diplopia, headaches, flashes/floaters, itching, tearing,   burning, redness, and pain.    (-)drops    OCULAR HISTORY  Last Eye Exam: 11/1/18 with Dr Puckett  (-)eye surgery   (-)diagnosed or treated for any eye conditions or diseases    FAMILY HISTORY  (-)Glaucoma        Last edited by Helen Bender, OD on 3/4/2020  3:45 PM. (History)            Assessment /Plan     For exam results, see Encounter Report.    Hypertensive retinopathy of both eyes    Bilateral dry eyes    Nuclear sclerosis of both eyes    Pituitary adenoma    Refractive error        1. Vessel changes OU. H/o Hollenhorst plaque OD. No plaques noted today. Discussed the importance of cholesterol and BP control and to take medications as directed by PCP. Monitor yearly with DFE or sooner if changes noted.     2. Educated pt. Recommend ATs BID-TID for added lubrication and comfort. Monitor.    3. Mild NS OU. Not visually significant. No need for removal at this time. Monitor yearly.     4. Stable. No abnormalities noted with CVF today. Last HVF WNL. Recommend follow-up HVF in the future for comparison. Monitor yearly.     5. Updated SRx. Minimal change from habitual. Monitor yearly.       RTC in 1 year for annual eye exam or sooner if needed.

## 2020-03-11 ENCOUNTER — LAB VISIT (OUTPATIENT)
Dept: LAB | Facility: HOSPITAL | Age: 55
End: 2020-03-11
Attending: INTERNAL MEDICINE
Payer: COMMERCIAL

## 2020-03-11 DIAGNOSIS — Z86.39 H/O SECONDARY HYPOGONADISM: ICD-10-CM

## 2020-03-11 DIAGNOSIS — E55.9 VITAMIN D DEFICIENCY: ICD-10-CM

## 2020-03-11 DIAGNOSIS — E27.49 SECONDARY ADRENAL INSUFFICIENCY: ICD-10-CM

## 2020-03-11 DIAGNOSIS — E03.8 SECONDARY HYPOTHYROIDISM: ICD-10-CM

## 2020-03-11 LAB
25(OH)D3+25(OH)D2 SERPL-MCNC: 28 NG/ML (ref 30–96)
ALBUMIN SERPL BCP-MCNC: 4.2 G/DL (ref 3.5–5.2)
ALP SERPL-CCNC: 86 U/L (ref 55–135)
ALT SERPL W/O P-5'-P-CCNC: 13 U/L (ref 10–44)
ANION GAP SERPL CALC-SCNC: 10 MMOL/L (ref 8–16)
AST SERPL-CCNC: 23 U/L (ref 10–40)
BILIRUB SERPL-MCNC: 0.3 MG/DL (ref 0.1–1)
BUN SERPL-MCNC: 14 MG/DL (ref 6–20)
CALCIUM SERPL-MCNC: 10.1 MG/DL (ref 8.7–10.5)
CHLORIDE SERPL-SCNC: 101 MMOL/L (ref 95–110)
CO2 SERPL-SCNC: 30 MMOL/L (ref 23–29)
CREAT SERPL-MCNC: 0.9 MG/DL (ref 0.5–1.4)
EST. GFR  (AFRICAN AMERICAN): >60 ML/MIN/1.73 M^2
EST. GFR  (NON AFRICAN AMERICAN): >60 ML/MIN/1.73 M^2
ESTIMATED AVG GLUCOSE: 123 MG/DL (ref 68–131)
GLUCOSE SERPL-MCNC: 100 MG/DL (ref 70–110)
HBA1C MFR BLD HPLC: 5.9 % (ref 4–5.6)
POTASSIUM SERPL-SCNC: 3.8 MMOL/L (ref 3.5–5.1)
PROT SERPL-MCNC: 8.2 G/DL (ref 6–8.4)
SODIUM SERPL-SCNC: 141 MMOL/L (ref 136–145)
T4 FREE SERPL-MCNC: 1.02 NG/DL (ref 0.71–1.51)

## 2020-03-11 PROCEDURE — 82306 VITAMIN D 25 HYDROXY: CPT

## 2020-03-11 PROCEDURE — 84439 ASSAY OF FREE THYROXINE: CPT

## 2020-03-11 PROCEDURE — 83036 HEMOGLOBIN GLYCOSYLATED A1C: CPT

## 2020-03-11 PROCEDURE — 80053 COMPREHEN METABOLIC PANEL: CPT

## 2020-03-11 PROCEDURE — 36415 COLL VENOUS BLD VENIPUNCTURE: CPT

## 2020-03-12 ENCOUNTER — PATIENT MESSAGE (OUTPATIENT)
Dept: ENDOCRINOLOGY | Facility: CLINIC | Age: 55
End: 2020-03-12

## 2020-04-01 DIAGNOSIS — G47.33 OBSTRUCTIVE SLEEP APNEA: Primary | ICD-10-CM

## 2020-04-09 ENCOUNTER — TELEPHONE (OUTPATIENT)
Dept: SLEEP MEDICINE | Facility: CLINIC | Age: 55
End: 2020-04-09

## 2020-04-23 ENCOUNTER — OFFICE VISIT (OUTPATIENT)
Dept: SLEEP MEDICINE | Facility: CLINIC | Age: 55
End: 2020-04-23
Payer: COMMERCIAL

## 2020-04-23 DIAGNOSIS — G47.33 OSA ON CPAP: Primary | ICD-10-CM

## 2020-04-23 PROCEDURE — 99213 PR OFFICE/OUTPT VISIT, EST, LEVL III, 20-29 MIN: ICD-10-PCS | Mod: 95,CR,, | Performed by: NURSE PRACTITIONER

## 2020-04-23 PROCEDURE — 99213 OFFICE O/P EST LOW 20 MIN: CPT | Mod: 95,CR,, | Performed by: NURSE PRACTITIONER

## 2020-04-23 NOTE — PROGRESS NOTES
The patient location is: home  The chief complaint leading to consultation is: JUAN mgt  Visit type: TELE AUDIOVISUAL:11585  Each patient to whom he or she provides medical services by telemedicine is:  (1) informed of the relationship between the physician and patient and the respective role of any other health care provider with respect to management of the patient; and (2) notified that he or she may decline to receive medical services by telemedicine and may withdraw from such care at any time. COVID-19    Notes: Since last seen she got new cpap machine 6-10cm. Using qhs. Dreamwear pillows has lot of leak and machine only registers 2h use time when she has it on all night, so went back to [place] and doing better. Sleeping and feeling better.Sleeps alone/. Snoring resolved with mask use. 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 Denies pressure intolerance. Airway drying w/o chin strap use with nose mask. ESS=0  Encore:  DATE RANGE: 3/24/2020 - 4/22/2020   1% leak  90% tile 8.8  Compliance Summary  Days with Device Usage: 26 days  Percentage of Days >=4 Hours: 73.3%  Average Usage (Days Used): 6 hrs. 38 mins. 39 secs.  Average Usage (All Days): 5 hrs. 45 mins. 30 secs.  Apnea Indices  Average AHI: 3.0  Denies symptoms of restless legs or kicking during sleep.     ROS: In addition to sleep symptoms, denies interval medical change. Otherwise a balance review of 10-systems is negative.         Interrogation- total 473, avg 4.8h/n. Nose mask  PSG (258#) 2/1/11 AHI 24.6/low sat 85% (REM AHI 78), titrated cpap 9cm    PHYSICAL EXAM:   GENERAL: morbid obese body habitus, well groomed   NEURO/PSYCH: Oriented to time, place and person. Normal attention span and concentration. Affect is full. Mood is normal.       ASSESSMENT:     JUAN, moderate, worse during REM.She is adherent with cpap, AHI<5. Benefiting from therapy  She has medical comorbidities of morbid obesity, hypertension, secondary adrenal  insufficiency      PLAN:   1. Continue apap 6-10m, supplies via [employer]   2. Discussed effectiveness of her therapy  and potential ramifications of untreated JUAN, including stroke, heart disease, HTN  3. The patient was advised to abstain from driving should she feel sleepy or drowsy.   4. rtc annually

## 2020-04-30 ENCOUNTER — PATIENT MESSAGE (OUTPATIENT)
Dept: OBSTETRICS AND GYNECOLOGY | Facility: CLINIC | Age: 55
End: 2020-04-30

## 2020-04-30 NOTE — TELEPHONE ENCOUNTER
Patient notified to come tomorrow at 8:15 and able to give urine specimen.  Patient verbalized understanding

## 2020-04-30 NOTE — TELEPHONE ENCOUNTER
Pap was negative at last well woman exam on 3/8/2019.  Annual has not been scheduled.  Please advise.

## 2020-05-01 ENCOUNTER — OFFICE VISIT (OUTPATIENT)
Dept: OBSTETRICS AND GYNECOLOGY | Facility: CLINIC | Age: 55
End: 2020-05-01
Payer: COMMERCIAL

## 2020-05-01 VITALS
BODY MASS INDEX: 42.01 KG/M2 | SYSTOLIC BLOOD PRESSURE: 120 MMHG | DIASTOLIC BLOOD PRESSURE: 70 MMHG | WEIGHT: 214 LBS | HEIGHT: 60 IN

## 2020-05-01 DIAGNOSIS — Z12.4 SCREENING FOR CERVICAL CANCER: ICD-10-CM

## 2020-05-01 DIAGNOSIS — R35.0 URINARY FREQUENCY: ICD-10-CM

## 2020-05-01 DIAGNOSIS — Z01.419 WELL WOMAN EXAM WITH ROUTINE GYNECOLOGICAL EXAM: Primary | ICD-10-CM

## 2020-05-01 PROCEDURE — 3074F SYST BP LT 130 MM HG: CPT | Mod: CPTII,S$GLB,, | Performed by: OBSTETRICS & GYNECOLOGY

## 2020-05-01 PROCEDURE — 3078F PR MOST RECENT DIASTOLIC BLOOD PRESSURE < 80 MM HG: ICD-10-PCS | Mod: CPTII,S$GLB,, | Performed by: OBSTETRICS & GYNECOLOGY

## 2020-05-01 PROCEDURE — 99999 PR PBB SHADOW E&M-EST. PATIENT-LVL III: CPT | Mod: PBBFAC,,, | Performed by: OBSTETRICS & GYNECOLOGY

## 2020-05-01 PROCEDURE — 99396 PREV VISIT EST AGE 40-64: CPT | Mod: S$GLB,,, | Performed by: OBSTETRICS & GYNECOLOGY

## 2020-05-01 PROCEDURE — 3074F PR MOST RECENT SYSTOLIC BLOOD PRESSURE < 130 MM HG: ICD-10-PCS | Mod: CPTII,S$GLB,, | Performed by: OBSTETRICS & GYNECOLOGY

## 2020-05-01 PROCEDURE — 88175 CYTOPATH C/V AUTO FLUID REDO: CPT

## 2020-05-01 PROCEDURE — 3078F DIAST BP <80 MM HG: CPT | Mod: CPTII,S$GLB,, | Performed by: OBSTETRICS & GYNECOLOGY

## 2020-05-01 PROCEDURE — 99999 PR PBB SHADOW E&M-EST. PATIENT-LVL III: ICD-10-PCS | Mod: PBBFAC,,, | Performed by: OBSTETRICS & GYNECOLOGY

## 2020-05-01 PROCEDURE — 87624 HPV HI-RISK TYP POOLED RSLT: CPT

## 2020-05-01 PROCEDURE — 99396 PR PREVENTIVE VISIT,EST,40-64: ICD-10-PCS | Mod: S$GLB,,, | Performed by: OBSTETRICS & GYNECOLOGY

## 2020-05-01 PROCEDURE — 87086 URINE CULTURE/COLONY COUNT: CPT

## 2020-05-01 NOTE — PROGRESS NOTES
OBSTETRIC HISTORY:   OB History        6    Para   6    Term   3            AB        Living   3       SAB        TAB        Ectopic        Multiple        Live Births   3                  COMPREHENSIVE GYN HISTORY:  PAP History: Denies abnormal Paps.  Infection History: Denies STDs. Denies PID.  Benign History: Denies uterine fibroids. Denies ovarian cysts. Denies endometriosis.   Cancer History: Denies cervical cancer. Denies uterine cancer or hyperplasia. Denies ovarian cancer. Denies vulvar cancer or pre-cancer. Denies vaginal cancer or pre-cancer. Denies breast cancer. Denies colon cancer.  Sexual Activity History:   reports that she currently engages in sexual activity. She reports using the following method of birth control/protection: Surgical.   Menstrual History: Every 30 days, flows for 5 days. Heavy (changes pad and tampon every 1-2 hours)  flow.  Dysmenorrhea History: Denies dysmenorrhea.         HPI:   54 y.o.  Patient's last menstrual period was 2014.    Patient is  here for her annual gynecologic exam.  She has complaints of low back pain associated with urinary frequency and urine odor. She denies bowel and breast complaints.    ROS:  GENERAL: Denies weight gain or weight loss. Feeling well overall.   SKIN: Denies rash or lesions.   HEAD: Denies headache.   CHEST: Denies shortness of breath.   ABDOMEN: No abdominal pain, constipation, diarrhea, nausea, vomiting or rectal bleeding.   URINARY: No dysuria, hematuria, or burning on urination.  REPRODUCTIVE: See HPI.   BREASTS: The patient denies pain, lumps, or nipple discharge.   HEMATOLOGIC: No easy bruisability.   MUSCULOSKELETAL: Denies joint pain    NEUROLOGIC: Denies weakness.   PSYCHIATRIC: Denies depression, anxiety or mood swings.     PE:   /70 (BP Location: Right arm, Patient Position: Sitting)   Ht 5' (1.524 m)   Wt 97.1 kg (214 lb)   LMP 2014   BMI 41.79 kg/m²   APPEARANCE: Well nourished, well developed,  in no acute distress.  NECK: Neck symmetric without  thyromegaly.  NODES: No inguinal, cervical lymph node enlargement.  CHEST: Lungs clear to auscultation.  HEART: Regular rate and rhythm, no murmurs, rubs or gallops.  ABDOMEN: Soft. No tenderness or masses. No hernias.  BREASTS: Symmetrical, no skin changes or visible lesions. No palpable masses, nipple discharge or adenopathy bilaterally.  PELVIC:   VULVA: No lesions. Normal female genitalia.  URETHRAL MEATUS: Normal size and location, no lesions, no prolapse.  URETHRA: No masses, tenderness, prolapse or scarring.  VAGINA: Atrophic and not well rugated, no discharge, no significant cystocele or rectocele.  CERVIX: No lesions and discharge.   UTERUS: Normal size, regular shape, mobile, non-tender, bladder base nontender.  ADNEXA: No masses or tenderness.    PROCEDURES:  Pap smear  HRHPV  U/A +blood  Urine culture    Assessment:  Normal Gynecologic Exam  Urinary frequency  Abnormal U/A    Plan:  Mammogram and Colonoscopy if indicated by current recommendations.  Return to clinic in one year or for any problems or complaints.    Counseling:  Patient was counseled today on A.C.S. Pap guidelines and recommendations for yearly pelvic exams and monthly self breast exams; to see her PCP for other health maintenance. Regular exercise and healthy diet.

## 2020-05-02 LAB
BACTERIA UR CULT: NORMAL
BACTERIA UR CULT: NORMAL

## 2020-05-04 ENCOUNTER — TELEPHONE (OUTPATIENT)
Dept: OBSTETRICS AND GYNECOLOGY | Facility: CLINIC | Age: 55
End: 2020-05-04

## 2020-05-04 ENCOUNTER — PATIENT MESSAGE (OUTPATIENT)
Dept: OBSTETRICS AND GYNECOLOGY | Facility: CLINIC | Age: 55
End: 2020-05-04

## 2020-05-04 ENCOUNTER — OFFICE VISIT (OUTPATIENT)
Dept: OBSTETRICS AND GYNECOLOGY | Facility: CLINIC | Age: 55
End: 2020-05-04
Payer: COMMERCIAL

## 2020-05-04 VITALS
HEIGHT: 60 IN | DIASTOLIC BLOOD PRESSURE: 80 MMHG | BODY MASS INDEX: 41.94 KG/M2 | SYSTOLIC BLOOD PRESSURE: 140 MMHG | WEIGHT: 213.63 LBS

## 2020-05-04 DIAGNOSIS — R35.0 URINARY FREQUENCY: Primary | ICD-10-CM

## 2020-05-04 LAB
FINAL PATHOLOGIC DIAGNOSIS: NORMAL
Lab: NORMAL

## 2020-05-04 PROCEDURE — 99213 OFFICE O/P EST LOW 20 MIN: CPT | Mod: S$GLB,,, | Performed by: OBSTETRICS & GYNECOLOGY

## 2020-05-04 PROCEDURE — 99213 PR OFFICE/OUTPT VISIT, EST, LEVL III, 20-29 MIN: ICD-10-PCS | Mod: S$GLB,,, | Performed by: OBSTETRICS & GYNECOLOGY

## 2020-05-04 PROCEDURE — 3079F DIAST BP 80-89 MM HG: CPT | Mod: CPTII,S$GLB,, | Performed by: OBSTETRICS & GYNECOLOGY

## 2020-05-04 PROCEDURE — 3008F BODY MASS INDEX DOCD: CPT | Mod: CPTII,S$GLB,, | Performed by: OBSTETRICS & GYNECOLOGY

## 2020-05-04 PROCEDURE — 87086 URINE CULTURE/COLONY COUNT: CPT

## 2020-05-04 PROCEDURE — 99999 PR PBB SHADOW E&M-EST. PATIENT-LVL III: CPT | Mod: PBBFAC,,, | Performed by: OBSTETRICS & GYNECOLOGY

## 2020-05-04 PROCEDURE — 3008F PR BODY MASS INDEX (BMI) DOCUMENTED: ICD-10-PCS | Mod: CPTII,S$GLB,, | Performed by: OBSTETRICS & GYNECOLOGY

## 2020-05-04 PROCEDURE — 99999 PR PBB SHADOW E&M-EST. PATIENT-LVL III: ICD-10-PCS | Mod: PBBFAC,,, | Performed by: OBSTETRICS & GYNECOLOGY

## 2020-05-04 PROCEDURE — 3077F PR MOST RECENT SYSTOLIC BLOOD PRESSURE >= 140 MM HG: ICD-10-PCS | Mod: CPTII,S$GLB,, | Performed by: OBSTETRICS & GYNECOLOGY

## 2020-05-04 PROCEDURE — 3077F SYST BP >= 140 MM HG: CPT | Mod: CPTII,S$GLB,, | Performed by: OBSTETRICS & GYNECOLOGY

## 2020-05-04 PROCEDURE — 3079F PR MOST RECENT DIASTOLIC BLOOD PRESSURE 80-89 MM HG: ICD-10-PCS | Mod: CPTII,S$GLB,, | Performed by: OBSTETRICS & GYNECOLOGY

## 2020-05-04 RX ORDER — NITROFURANTOIN 25; 75 MG/1; MG/1
100 CAPSULE ORAL 2 TIMES DAILY
Qty: 10 CAPSULE | Refills: 0 | Status: SHIPPED | OUTPATIENT
Start: 2020-05-04 | End: 2020-05-09

## 2020-05-04 NOTE — TELEPHONE ENCOUNTER
Spoke with patient and patient states she would like to come into the office to do a Cath urine.     Patient has an appointment today @ 1pm

## 2020-05-04 NOTE — TELEPHONE ENCOUNTER
Notify urine culture contaminated if still having symptoms needs an in and out cath  Or we can send antibiotics at this point and if not better do in and out cath

## 2020-05-04 NOTE — TELEPHONE ENCOUNTER
Options give to patient, she would like to try the antibiotics.  Preferred pharmacy up to date, [facility] in [place].  Patient would like to know how long should she give the antibiotic before calling if not better?

## 2020-05-04 NOTE — PROGRESS NOTES
OBSTETRIC HISTORY:   OB History        3    Para   3    Term   0            AB        Living   3       SAB        TAB        Ectopic        Multiple        Live Births   3                COMPREHENSIVE GYN HISTORY:  PAP History: Denies abnormal Paps.  Infection History: Denies STDs. Denies PID.  Benign History: Denies uterine fibroids. Denies ovarian cysts. Denies endometriosis.   Cancer History: Denies cervical cancer. Denies uterine cancer or hyperplasia. Denies ovarian cancer. Denies vulvar cancer or pre-cancer. Denies vaginal cancer or pre-cancer. Denies breast cancer. Denies colon cancer.  Sexual Activity History:   reports that she currently engages in sexual activity. She reports using the following method of birth control/protection: Surgical.   Menstrual History: Every 30 days, flows for 5 days. Heavy (changes pad and tampon every 1-2 hours)  flow.  Dysmenorrhea History: Denies dysmenorrhea.       HPI:   54 y.o.  Patient's last menstrual period was 2014.   Patient is  here for in and out cath 2/2 contaminated urine culture for urinary frequency.    ROS:  GENERAL: Denies weight gain or weight loss. Feeling well overall.   SKIN: Denies rash or lesions.   HEAD: Denies headache.   NODES: Denies enlarged lymph nodes.   CHEST: Denies shortness of breath.   ABDOMEN: No abdominal pain, constipation, diarrhea, nausea, vomiting or rectal bleeding.   URINARY: No dysuria, hematuria, or burning on urination.  REPRODUCTIVE: See HPI.   BREASTS: The patient denies pain, lumps, or nipple discharge.   HEMATOLOGIC: No easy bruisability.   MUSCULOSKELETAL: Denies joint pain or back pain.   NEUROLOGIC: Denies weakness.   PSYCHIATRIC: Denies depression, anxiety or mood swings.    PE:   BP (!) 140/80   Ht 5' (1.524 m)   Wt 96.9 kg (213 lb 9.6 oz)   LMP 2014   BMI 41.72 kg/m²   APPEARANCE: Well nourished, well developed, in no acute distress.  ABDOMEN: Soft. No tenderness or masses. No  hernias.  PELVIC:   VULVA: No lesions. Normal female genitalia.  URETHRAL MEATUS: Normal size and location, no lesions, no prolapse.  URETHRA: No masses, tenderness, prolapse or scarring.  In and out cath performed    ASSESSMENT:  Urinary Frequency -- urine culture      PLAN:  RTO prn

## 2020-05-04 NOTE — TELEPHONE ENCOUNTER
----- Message from Cristina Dorsey sent at 5/4/2020  9:36 AM CDT -----  Contact: Patient  Type:  Patient Returning Call    Who Called: Matt  Who Left Message for Patient:Arabella  Does the patient know what this is regarding? appointment  Would the patient rather a call back or a response via [facility]? call  Best Call Back Number: [phone]  Additional Information: Patient says she can come in this afternoon Please call to schedule time

## 2020-05-06 ENCOUNTER — PATIENT MESSAGE (OUTPATIENT)
Dept: OBSTETRICS AND GYNECOLOGY | Facility: CLINIC | Age: 55
End: 2020-05-06

## 2020-05-06 LAB — BACTERIA UR CULT: NO GROWTH

## 2020-05-07 ENCOUNTER — TELEPHONE (OUTPATIENT)
Dept: OBSTETRICS AND GYNECOLOGY | Facility: CLINIC | Age: 55
End: 2020-05-07

## 2020-05-07 ENCOUNTER — PATIENT MESSAGE (OUTPATIENT)
Dept: OBSTETRICS AND GYNECOLOGY | Facility: CLINIC | Age: 55
End: 2020-05-07

## 2020-05-07 LAB
HPV HR 12 DNA SPEC QL NAA+PROBE: NEGATIVE
HPV16 AG SPEC QL: NEGATIVE
HPV18 DNA SPEC QL NAA+PROBE: NEGATIVE

## 2020-05-07 NOTE — TELEPHONE ENCOUNTER
----- Message from Sarah Pelaez sent at 5/7/2020  3:27 PM CDT -----  Contact: [phone]-self  Patient is requesting a call back concerning her last visits test results. Please call

## 2020-05-07 NOTE — TELEPHONE ENCOUNTER
Patient states you had given her the results of her urine cult. And states she is still having discomfort with low back pain and is taking 3 ibuprofen's a day.    Please advise.

## 2020-05-08 ENCOUNTER — OFFICE VISIT (OUTPATIENT)
Dept: INTERNAL MEDICINE | Facility: CLINIC | Age: 55
End: 2020-05-08
Payer: COMMERCIAL

## 2020-05-08 ENCOUNTER — HOSPITAL ENCOUNTER (OUTPATIENT)
Dept: RADIOLOGY | Facility: HOSPITAL | Age: 55
Discharge: HOME | End: 2020-05-08
Attending: FAMILY MEDICINE
Payer: COMMERCIAL

## 2020-05-08 VITALS
HEART RATE: 67 BPM | OXYGEN SATURATION: 99 % | SYSTOLIC BLOOD PRESSURE: 128 MMHG | HEIGHT: 60 IN | WEIGHT: 211 LBS | TEMPERATURE: 99 F | DIASTOLIC BLOOD PRESSURE: 84 MMHG | BODY MASS INDEX: 41.43 KG/M2

## 2020-05-08 DIAGNOSIS — M54.50 ACUTE MIDLINE LOW BACK PAIN, UNSPECIFIED WHETHER SCIATICA PRESENT: ICD-10-CM

## 2020-05-08 DIAGNOSIS — Z87.448 HISTORY OF HEMATURIA: ICD-10-CM

## 2020-05-08 PROCEDURE — 3079F PR MOST RECENT DIASTOLIC BLOOD PRESSURE 80-89 MM HG: ICD-10-PCS | Mod: CPTII,S$GLB,, | Performed by: FAMILY MEDICINE

## 2020-05-08 PROCEDURE — 99213 PR OFFICE/OUTPT VISIT, EST, LEVL III, 20-29 MIN: ICD-10-PCS | Mod: S$GLB,,, | Performed by: FAMILY MEDICINE

## 2020-05-08 PROCEDURE — 99999 PR PBB SHADOW E&M-EST. PATIENT-LVL III: ICD-10-PCS | Mod: PBBFAC,,, | Performed by: FAMILY MEDICINE

## 2020-05-08 PROCEDURE — 3074F SYST BP LT 130 MM HG: CPT | Mod: CPTII,S$GLB,, | Performed by: FAMILY MEDICINE

## 2020-05-08 PROCEDURE — 3079F DIAST BP 80-89 MM HG: CPT | Mod: CPTII,S$GLB,, | Performed by: FAMILY MEDICINE

## 2020-05-08 PROCEDURE — 72110 X-RAY EXAM L-2 SPINE 4/>VWS: CPT | Mod: TC

## 2020-05-08 PROCEDURE — 3074F PR MOST RECENT SYSTOLIC BLOOD PRESSURE < 130 MM HG: ICD-10-PCS | Mod: CPTII,S$GLB,, | Performed by: FAMILY MEDICINE

## 2020-05-08 PROCEDURE — 99999 PR PBB SHADOW E&M-EST. PATIENT-LVL III: CPT | Mod: PBBFAC,,, | Performed by: FAMILY MEDICINE

## 2020-05-08 PROCEDURE — 99213 OFFICE O/P EST LOW 20 MIN: CPT | Mod: S$GLB,,, | Performed by: FAMILY MEDICINE

## 2020-05-08 PROCEDURE — 72110 X-RAY EXAM L-2 SPINE 4/>VWS: CPT | Mod: 26,,, | Performed by: RADIOLOGY

## 2020-05-08 PROCEDURE — 3008F BODY MASS INDEX DOCD: CPT | Mod: CPTII,S$GLB,, | Performed by: FAMILY MEDICINE

## 2020-05-08 PROCEDURE — 3008F PR BODY MASS INDEX (BMI) DOCUMENTED: ICD-10-PCS | Mod: CPTII,S$GLB,, | Performed by: FAMILY MEDICINE

## 2020-05-08 PROCEDURE — 72110 XR LUMBAR SPINE COMPLETE 5 VIEW: ICD-10-PCS | Mod: 26,,, | Performed by: RADIOLOGY

## 2020-05-08 NOTE — PROGRESS NOTES
Subjective:      Patient ID: Matt Lomax is a 54 y.o. female.    Chief Complaint: Back Pain      HPI:  Matt Lomax is a 54 year old female with history of secondary hypogonadism, hyperlipidemia, hyperprolactinemia, hypertension, hypothyroidism, impaired glucose tolerance, morbid obesity, JUAN on CPAP, pituitary adenoma, and secondary adrenal insufficiency who presents to clinic today with a chief complaint of back pain.    Complains of lower back pain that started about 1 week ago.  Described as a throbbing sensation.  Endorses associated pain radiating down the back of her legs when trying to stand up; radiated down to the level of the thigh bilaterally.  Pain made worse with lying/sitting for prolonged periods.  Had some suprapubic pressure as well so she went to her OBGYN thinking she had a UTI but was told her urine did not appear to be infected.  Had some vomiting this past Saturday.  States her back pain has persisted.  Denies any further episodes of emesis but did feel nauseated yesterday.  Did spit up some yesterday but no hebert emesis.  Back pain localized to the middle of the lower back.  Denies associated lower extremity numbness/tingling/weakness.  States she takes 3 tabs of ibuprofen with improvement.  Denies associated muscle spasms, urinary/bowel incontinence.  States her OBGYN did notice some blood in the urine initially but was told this may be contaminated.  Wanted to make sure she doesn't have kidney stones; denies associated difficulty urinating but states it is warm when she urinates.  States her urine odor was abnormal.  Was told her pH of the vaginal area was off by OBGYN.  Had chills one night last week (Wed. Or Thurs.) but this has resolved; denies hebert fevers.      Past Medical History:   Diagnosis Date    HTN (hypertension)     Hyperprolactinemia     Morbid obesity     JUAN (obstructive sleep apnea)     Pituitary adenoma     Secondary hypothyroidism 4/21/2015    Type II  or unspecified type diabetes mellitus without mention of complication, uncontrolled     Vitamin D deficiency disease        Past Surgical History:   Procedure Laterality Date    Breast Reduction       SECTION      x 3    COLONOSCOPY N/A 2017    Procedure: COLONOSCOPY;  Surgeon: Ciro Rebolledo MD;  Location: [facility] ([address] [place] [address]);  Service: Endoscopy;  Laterality: N/A;    TOTAL REDUCTION MAMMOPLASTY Bilateral     TRANSPHENOIDAL PITUITARY RESECTION  14    TUBAL LIGATION         Family History   Problem Relation Age of Onset    Diabetes Mother     Hypertension Mother     Heart disease Mother     Hypertension Father     Diabetes Maternal Aunt     No Known Problems Brother     No Known Problems Daughter     No Known Problems Son     No Known Problems Brother     No Known Problems Daughter     No Known Problems Sister     No Known Problems Maternal Uncle     No Known Problems Paternal Aunt     No Known Problems Paternal Uncle     No Known Problems Maternal Grandmother     No Known Problems Maternal Grandfather     No Known Problems Paternal Grandmother     No Known Problems Paternal Grandfather     Breast cancer Neg Hx     Colon cancer Neg Hx     Ovarian cancer Neg Hx     Amblyopia Neg Hx     Blindness Neg Hx     Cancer Neg Hx     Cataracts Neg Hx     Glaucoma Neg Hx     Macular degeneration Neg Hx     Retinal detachment Neg Hx     Strabismus Neg Hx     Stroke Neg Hx     Thyroid disease Neg Hx        Social History     Socioeconomic History    Marital status:      Spouse name: Not on file    Number of children: Not on file    Years of education: Not on file    Highest education level: Not on file   Occupational History    Occupation:      Employer: [employer]   Social Needs    Financial resource strain: Not on file    Food insecurity:     Worry: Not on file     Inability: Not on file    Transportation needs:     Medical:  Not on file     Non-medical: Not on file   Tobacco Use    Smoking status: Never Smoker    Smokeless tobacco: Never Used   Substance and Sexual Activity    Alcohol use: No    Drug use: No    Sexual activity: Not Currently     Partners: Male     Birth control/protection: Surgical, Post-menopausal   Lifestyle    Physical activity:     Days per week: Not on file     Minutes per session: Not on file    Stress: Not on file   Relationships    Social connections:     Talks on phone: Not on file     Gets together: Not on file     Attends [employer] service: Not on file     Active member of club or organization: Not on file     Attends meetings of clubs or organizations: Not on file     Relationship status: Not on file   Other Topics Concern    Not on file   Social History Narrative    Not on file       Review of Systems   Constitutional: Negative for chills, fatigue and fever.   HENT: Negative for congestion, hearing loss, nosebleeds, rhinorrhea, sore throat and trouble swallowing.    Eyes: Negative for pain and visual disturbance.   Respiratory: Negative for cough, shortness of breath and wheezing.    Cardiovascular: Negative for chest pain and palpitations.   Gastrointestinal: Negative for abdominal distention, abdominal pain, constipation, diarrhea, nausea and vomiting.   Genitourinary: Negative for decreased urine volume, difficulty urinating, dysuria, hematuria and urgency.   Musculoskeletal: Positive for back pain and myalgias. Negative for arthralgias.   Skin: Negative for color change and rash.   Neurological: Negative for dizziness, tremors, weakness, light-headedness, numbness and headaches.   Psychiatric/Behavioral: Negative for agitation, behavioral problems and confusion. The patient is not nervous/anxious.      Objective:     Vitals:    05/08/20 1043   BP: 128/84   BP Location: Left arm   Patient Position: Sitting   BP Method: Large (Manual)   Pulse: 67   Temp: 98.8 °F (37.1 °C)   TempSrc: Oral   SpO2:  "99%   Weight: 95.7 kg (211 lb)   Height: 5' (1.524 m)       Physical Exam   Constitutional: She appears well-developed and well-nourished.   HENT:   Head: Normocephalic and atraumatic.   Right Ear: External ear normal.   Left Ear: External ear normal.   Nose: Nose normal.   Eyes: Pupils are equal, round, and reactive to light. Conjunctivae are normal.   Neck: Neck supple. No tracheal deviation present.   Cardiovascular: Normal rate, regular rhythm and normal heart sounds. Exam reveals no gallop and no friction rub.   No murmur heard.  Pulmonary/Chest: Effort normal and breath sounds normal. No respiratory distress. She has no wheezes. She has no rales.   Abdominal: Soft. Bowel sounds are normal. She exhibits no distension. There is no tenderness. There is no rebound, no guarding and no CVA tenderness.   Musculoskeletal: Normal range of motion. She exhibits no deformity.        Lumbar back: She exhibits no tenderness, no bony tenderness, no swelling, no edema, no deformity and no laceration.   Straight leg raise equivocal on the right at approximately 45 degrees of elevation (reported posterior thigh "tightness" with exam); negative on the left.   Neurological: She is alert.   Skin: Skin is warm and dry.   Psychiatric: She has a normal mood and affect. Her behavior is normal.   Nursing note and vitals reviewed.     Assessment:      1. Acute midline low back pain, unspecified whether sciatica present    2. History of hematuria      Plan:   Matt was seen today for back pain.    Diagnoses and all orders for this visit:    Acute midline low back pain, unspecified whether sciatica present  -     X-Ray Lumbar Spine Complete 5 View; Future  -     Recommended she continue OTC ibuprofen (maximum 800 mg PO Q8H PRN with food), ice packs/cold compresses vs. Heating pad, avoidance of exacerbating activities/heavy lifting, OTC topical such as Aspercreme PRN.  To notify me if no improvement or if symptoms not adequately " controlled with above.  Consider PT pending X-ray results.    History of hematuria  -     Urinalysis, Reflex to Urine Culture Urine, Clean Catch  -     CVA tenderness negative on exam.  Check UA; if hematuria persistent will check renal US.

## 2020-05-13 ENCOUNTER — TELEPHONE (OUTPATIENT)
Dept: INTERNAL MEDICINE | Facility: CLINIC | Age: 55
End: 2020-05-13

## 2020-05-13 ENCOUNTER — HOSPITAL ENCOUNTER (OUTPATIENT)
Dept: RADIOLOGY | Facility: CLINIC | Age: 55
Discharge: HOME | End: 2020-05-13
Attending: INTERNAL MEDICINE
Payer: COMMERCIAL

## 2020-05-13 DIAGNOSIS — M54.50 ACUTE LOW BACK PAIN, UNSPECIFIED BACK PAIN LATERALITY, UNSPECIFIED WHETHER SCIATICA PRESENT: Primary | ICD-10-CM

## 2020-05-13 DIAGNOSIS — Z86.39 H/O SECONDARY HYPOGONADISM: ICD-10-CM

## 2020-05-13 DIAGNOSIS — E03.8 SECONDARY HYPOTHYROIDISM: ICD-10-CM

## 2020-05-13 DIAGNOSIS — E27.49 SECONDARY ADRENAL INSUFFICIENCY: ICD-10-CM

## 2020-05-13 DIAGNOSIS — Z87.448 HISTORY OF HEMATURIA: ICD-10-CM

## 2020-05-13 PROCEDURE — 77080 DXA BONE DENSITY AXIAL: CPT | Mod: TC

## 2020-05-13 PROCEDURE — 77080 DXA BONE DENSITY AXIAL: CPT | Mod: 26,,, | Performed by: INTERNAL MEDICINE

## 2020-05-13 PROCEDURE — 77080 DEXA BONE DENSITY SPINE HIP: ICD-10-PCS | Mod: 26,,, | Performed by: INTERNAL MEDICINE

## 2020-05-13 NOTE — TELEPHONE ENCOUNTER
Referred to PT and back/spine clinic; explanation sent via portal.  Please process/notify patient.

## 2020-05-25 ENCOUNTER — PATIENT MESSAGE (OUTPATIENT)
Dept: ENDOCRINOLOGY | Facility: CLINIC | Age: 55
End: 2020-05-25

## 2020-05-28 ENCOUNTER — PATIENT MESSAGE (OUTPATIENT)
Dept: INTERNAL MEDICINE | Facility: CLINIC | Age: 55
End: 2020-05-28

## 2020-05-28 DIAGNOSIS — M54.9 BACK PAIN, UNSPECIFIED BACK LOCATION, UNSPECIFIED BACK PAIN LATERALITY, UNSPECIFIED CHRONICITY: Primary | ICD-10-CM

## 2020-05-28 RX ORDER — IBUPROFEN 800 MG/1
800 TABLET ORAL EVERY 8 HOURS PRN
Qty: 30 TABLET | Refills: 2 | Status: SHIPPED | OUTPATIENT
Start: 2020-05-28 | End: 2023-07-18

## 2020-06-01 PROBLEM — R29.898 LEG WEAKNESS, BILATERAL: Status: ACTIVE | Noted: 2020-06-01

## 2020-06-01 PROBLEM — M54.50 LOW BACK PAIN: Status: ACTIVE | Noted: 2020-06-01

## 2020-06-10 ENCOUNTER — OCCUPATIONAL HEALTH (OUTPATIENT)
Dept: URGENT CARE | Facility: CLINIC | Age: 55
End: 2020-06-10

## 2020-06-10 DIAGNOSIS — Z00.00 PE (PHYSICAL EXAM), ROUTINE: Primary | ICD-10-CM

## 2020-06-10 PROCEDURE — 99499 UNLISTED E&M SERVICE: CPT | Mod: S$GLB,,, | Performed by: PHYSICIAN ASSISTANT

## 2020-06-10 PROCEDURE — 99499 DOT PHYSICAL: ICD-10-PCS | Mod: S$GLB,,, | Performed by: PHYSICIAN ASSISTANT

## 2020-06-24 DIAGNOSIS — E11.9 TYPE 2 DIABETES MELLITUS WITHOUT COMPLICATION: ICD-10-CM

## 2020-07-22 ENCOUNTER — PATIENT OUTREACH (OUTPATIENT)
Dept: ADMINISTRATIVE | Facility: HOSPITAL | Age: 55
End: 2020-07-22

## 2020-07-31 ENCOUNTER — OFFICE VISIT (OUTPATIENT)
Dept: INTERNAL MEDICINE | Facility: CLINIC | Age: 55
End: 2020-07-31
Payer: COMMERCIAL

## 2020-07-31 VITALS
TEMPERATURE: 96 F | SYSTOLIC BLOOD PRESSURE: 114 MMHG | BODY MASS INDEX: 40.52 KG/M2 | HEART RATE: 69 BPM | WEIGHT: 206.38 LBS | OXYGEN SATURATION: 97 % | DIASTOLIC BLOOD PRESSURE: 70 MMHG | HEIGHT: 60 IN

## 2020-07-31 DIAGNOSIS — G47.00 INSOMNIA, UNSPECIFIED TYPE: ICD-10-CM

## 2020-07-31 DIAGNOSIS — Z00.00 ANNUAL PHYSICAL EXAM: ICD-10-CM

## 2020-07-31 DIAGNOSIS — I10 ESSENTIAL HYPERTENSION: ICD-10-CM

## 2020-07-31 DIAGNOSIS — E66.01 OBESITY, MORBID, BMI 40.0-49.9: ICD-10-CM

## 2020-07-31 DIAGNOSIS — E78.5 HYPERLIPIDEMIA, UNSPECIFIED HYPERLIPIDEMIA TYPE: ICD-10-CM

## 2020-07-31 DIAGNOSIS — E11.9 TYPE 2 DIABETES MELLITUS WITHOUT COMPLICATION, WITHOUT LONG-TERM CURRENT USE OF INSULIN: ICD-10-CM

## 2020-07-31 DIAGNOSIS — Z20.822 EXPOSURE TO COVID-19 VIRUS: ICD-10-CM

## 2020-07-31 DIAGNOSIS — G47.33 OSA ON CPAP: ICD-10-CM

## 2020-07-31 DIAGNOSIS — E03.8 SECONDARY HYPOTHYROIDISM: ICD-10-CM

## 2020-07-31 PROCEDURE — 3074F PR MOST RECENT SYSTOLIC BLOOD PRESSURE < 130 MM HG: ICD-10-PCS | Mod: CPTII,S$GLB,, | Performed by: FAMILY MEDICINE

## 2020-07-31 PROCEDURE — 3044F HG A1C LEVEL LT 7.0%: CPT | Mod: CPTII,S$GLB,, | Performed by: FAMILY MEDICINE

## 2020-07-31 PROCEDURE — 99396 PREV VISIT EST AGE 40-64: CPT | Mod: S$GLB,,, | Performed by: FAMILY MEDICINE

## 2020-07-31 PROCEDURE — 3078F DIAST BP <80 MM HG: CPT | Mod: CPTII,S$GLB,, | Performed by: FAMILY MEDICINE

## 2020-07-31 PROCEDURE — 99396 PR PREVENTIVE VISIT,EST,40-64: ICD-10-PCS | Mod: S$GLB,,, | Performed by: FAMILY MEDICINE

## 2020-07-31 PROCEDURE — 3008F BODY MASS INDEX DOCD: CPT | Mod: CPTII,S$GLB,, | Performed by: FAMILY MEDICINE

## 2020-07-31 PROCEDURE — 99999 PR PBB SHADOW E&M-EST. PATIENT-LVL III: CPT | Mod: PBBFAC,,, | Performed by: FAMILY MEDICINE

## 2020-07-31 PROCEDURE — 3008F PR BODY MASS INDEX (BMI) DOCUMENTED: ICD-10-PCS | Mod: CPTII,S$GLB,, | Performed by: FAMILY MEDICINE

## 2020-07-31 PROCEDURE — 3078F PR MOST RECENT DIASTOLIC BLOOD PRESSURE < 80 MM HG: ICD-10-PCS | Mod: CPTII,S$GLB,, | Performed by: FAMILY MEDICINE

## 2020-07-31 PROCEDURE — 3044F PR MOST RECENT HEMOGLOBIN A1C LEVEL <7.0%: ICD-10-PCS | Mod: CPTII,S$GLB,, | Performed by: FAMILY MEDICINE

## 2020-07-31 PROCEDURE — 3074F SYST BP LT 130 MM HG: CPT | Mod: CPTII,S$GLB,, | Performed by: FAMILY MEDICINE

## 2020-07-31 PROCEDURE — 99999 PR PBB SHADOW E&M-EST. PATIENT-LVL III: ICD-10-PCS | Mod: PBBFAC,,, | Performed by: FAMILY MEDICINE

## 2020-07-31 RX ORDER — VERAPAMIL HYDROCHLORIDE 240 MG/1
TABLET, FILM COATED, EXTENDED RELEASE ORAL
Qty: 90 TABLET | Refills: 3 | Status: SHIPPED | OUTPATIENT
Start: 2020-07-31 | End: 2021-08-07 | Stop reason: SDUPTHER

## 2020-07-31 RX ORDER — PRAVASTATIN SODIUM 20 MG/1
20 TABLET ORAL DAILY
Qty: 90 TABLET | Refills: 3 | Status: SHIPPED | OUTPATIENT
Start: 2020-07-31 | End: 2021-08-07 | Stop reason: SDUPTHER

## 2020-07-31 RX ORDER — TRIAMTERENE AND HYDROCHLOROTHIAZIDE 37.5; 25 MG/1; MG/1
1 CAPSULE ORAL DAILY
Qty: 90 CAPSULE | Refills: 3 | Status: SHIPPED | OUTPATIENT
Start: 2020-07-31 | End: 2021-08-07 | Stop reason: SDUPTHER

## 2020-07-31 NOTE — PROGRESS NOTES
Subjective:      Patient ID: Matt Lomax is a 54 y.o. female.    Chief Complaint: Annual Exam      HPI:  Matt Lomax is a 54 year old female with diabetes mellitus type 2, history of secondary hypogonadism, hyperlipidemia, hyperprolactinemia, hypertension, hypothyroidism, morbid obesity, JUAN on CPAP, pituitary adenoma, and secondary adrenal insufficiency who presents to clinic today for annual exam.    Complains of right groin pain.  Began a couple of weeks ago.  Worse after sitting for too long and then will walk with a limp.  Symptoms stable.  Denies any known inciting event.  Denies associated numbness/tingling, overlying skin changes, mass/lump, RLE weakness.  Tried to stretch out the area.  New problem.  Intermittent.  Thinks her symptoms are caused by a right foot bunion; plans to f/u with outside podiatry for this.    Requests COVID-19 antibody testing.    Adrenal insufficiency:  Takes hydrocortisone 10 mg by mouth with breakfast and half a tablet with dinner.  Followed by endocrinology    DM 2:  Last A1c 5.9% 3/11/20.  Previously on metformin but this was discontinued in 2017 by PCP.      HLD:  Prescribed pravastatin 20 mg by mouth daily.  Triglycerides 252 and HDL 30 on lipid panel 6/21/19.    HTN:  Blood pressure within goal today.  Prescribed triamterene-hydrochlorothiazide 37.5-25 mg by mouth daily and verapamil 240 mg by mouth daily.    Hypothyroidism:  Prescribed levothyroxine 75 mcg by mouth daily.      Hyperprolactinemia; pituitary adenoma:  Diagnosed with macroadenoma, possible prolactinoma, ~ in 2006 and started on cabergoline at that time due to elevated prolactin (highest value I can find is 67).  Monitored with MRI with increase in size over time so she was referred to  and she had surgery in 7/2014.  Prolactin has been normal since time of surgery off cabergoline.  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     Insomnia:  Prescribed Ambien 5 mg by mouth nightly as needed for  sleep.  States she does not take this all the time.     Obesity:  BMI 40.30.  Down 5 lbs since last visit 20.  Watching her diet.  Exercising, walking regimen and aerobic exercises.    JUAN:  On CPAP.    Health Care Maintenance:  Last tetanus booster:  16  Shingles vaccination:  Refused  Pneumovax:  16  Hepatitis C screening:  Amenable to having this done  Last pap smear:  20; normal  Last mammogram:  3/4/20; no evidence of malignancy; ordered through OBGYN  Last colonoscopy:  17, one 3 mm polyp removed; repeat 5 years      Past Medical History:   Diagnosis Date    HTN (hypertension)     Hyperprolactinemia     Morbid obesity     JUAN (obstructive sleep apnea)     Pituitary adenoma     Secondary hypothyroidism 2015    Type II or unspecified type diabetes mellitus without mention of complication, uncontrolled     Vitamin D deficiency disease        Past Surgical History:   Procedure Laterality Date    Breast Reduction       SECTION      x 3    COLONOSCOPY N/A 2017    Procedure: COLONOSCOPY;  Surgeon: Ciro Rebolledo MD;  Location: [facility] ([address] [place] [address]);  Service: Endoscopy;  Laterality: N/A;    TOTAL REDUCTION MAMMOPLASTY Bilateral     TRANSPHENOIDAL PITUITARY RESECTION  14    TUBAL LIGATION         Family History   Problem Relation Age of Onset    Diabetes Mother     Hypertension Mother     Heart disease Mother     Hypertension Father     Diabetes Maternal Aunt     No Known Problems Brother     No Known Problems Daughter     No Known Problems Son     No Known Problems Brother     No Known Problems Daughter     No Known Problems Sister     No Known Problems Maternal Uncle     No Known Problems Paternal Aunt     No Known Problems Paternal Uncle     No Known Problems Maternal Grandmother     No Known Problems Maternal Grandfather     No Known Problems Paternal Grandmother     No Known Problems Paternal Grandfather     Breast cancer Neg Hx      Colon cancer Neg Hx     Ovarian cancer Neg Hx     Amblyopia Neg Hx     Blindness Neg Hx     Cancer Neg Hx     Cataracts Neg Hx     Glaucoma Neg Hx     Macular degeneration Neg Hx     Retinal detachment Neg Hx     Strabismus Neg Hx     Stroke Neg Hx     Thyroid disease Neg Hx        Social History     Socioeconomic History    Marital status:      Spouse name: Not on file    Number of children: Not on file    Years of education: Not on file    Highest education level: Not on file   Occupational History    Occupation:      Employer: [employer]   Social Needs    Financial resource strain: Not hard at all    Food insecurity     Worry: Never true     Inability: Never true    Transportation needs     Medical: No     Non-medical: No   Tobacco Use    Smoking status: Never Smoker    Smokeless tobacco: Never Used   Substance and Sexual Activity    Alcohol use: No     Frequency: Never     Drinks per session: Patient refused     Binge frequency: Never    Drug use: No    Sexual activity: Not Currently     Partners: Male     Birth control/protection: Surgical, Post-menopausal   Lifestyle    Physical activity     Days per week: 5 days     Minutes per session: Not on file    Stress: Not at all   Relationships    Social connections     Talks on phone: More than three times a week     Gets together: Never     Attends [employer] service: Not on file     Active member of club or organization: Yes     Attends meetings of clubs or organizations: More than 4 times per year     Relationship status:    Other Topics Concern    Not on file   Social History Narrative    Not on file       Review of Systems   Constitutional: Negative for activity change, chills, fatigue, fever and unexpected weight change.   HENT: Negative for congestion, hearing loss, nosebleeds, rhinorrhea, sore throat and trouble swallowing.    Eyes: Negative for pain, discharge and visual disturbance.    Respiratory: Negative for cough, chest tightness, shortness of breath and wheezing.    Cardiovascular: Negative for chest pain and palpitations.   Gastrointestinal: Negative for abdominal distention, abdominal pain, blood in stool, constipation, diarrhea, nausea and vomiting.   Endocrine: Negative for polydipsia and polyuria.   Genitourinary: Negative for decreased urine volume, difficulty urinating, dysuria, hematuria, menstrual problem and urgency.   Musculoskeletal: Positive for arthralgias. Negative for back pain, joint swelling, myalgias and neck pain.        + Right groin pain   Skin: Negative for color change and rash.   Neurological: Negative for dizziness, tremors, weakness, light-headedness, numbness and headaches.   Psychiatric/Behavioral: Negative for agitation, behavioral problems, confusion and dysphoric mood. The patient is not nervous/anxious.      Objective:     Vitals:    07/31/20 1047   BP: 114/70   BP Location: Left arm   Patient Position: Sitting   BP Method: Large (Manual)   Pulse: 69   Temp: 96.2 °F (35.7 °C)   SpO2: 97%   Weight: 93.6 kg (206 lb 5.6 oz)   Height: 5' (1.524 m)       Physical Exam  Vitals signs and nursing note reviewed.   Constitutional:       Appearance: She is well-developed.   HENT:      Head: Normocephalic and atraumatic.      Right Ear: External ear normal.      Left Ear: External ear normal.      Nose: Nose normal.   Eyes:      Conjunctiva/sclera: Conjunctivae normal.   Neck:      Musculoskeletal: Neck supple.      Trachea: No tracheal deviation.   Cardiovascular:      Rate and Rhythm: Normal rate and regular rhythm.      Heart sounds: Normal heart sounds. No murmur. No friction rub. No gallop.    Pulmonary:      Effort: Pulmonary effort is normal. No respiratory distress.      Breath sounds: Normal breath sounds. No wheezing or rales.   Abdominal:      General: Bowel sounds are normal. There is no distension.      Palpations: Abdomen is soft.      Tenderness: There is  no abdominal tenderness. There is no guarding or rebound.   Musculoskeletal:         General: No deformity.      Right hip: She exhibits normal range of motion, no tenderness and no bony tenderness.      Comments: FREDO testing negative to right hip.   Skin:     General: Skin is warm and dry.   Neurological:      Mental Status: She is alert.   Psychiatric:         Behavior: Behavior normal.        Assessment:      1. Annual physical exam    2. Type 2 diabetes mellitus without complication, without long-term current use of insulin    3. Exposure to Covid-19 Virus    4. Hyperlipidemia, unspecified hyperlipidemia type    5. Essential hypertension    6. Secondary hypothyroidism    7. Insomnia, unspecified type    8. Obesity, morbid, BMI 40.0-49.9    9. JUAN on CPAP      Plan:   Matt was seen today for annual exam.    Diagnoses and all orders for this visit:    Annual physical exam  -     Comprehensive metabolic panel; Future  -     CBC auto differential; Future  -     TSH; Future  -     Lipid Panel; Future  -     Hemoglobin A1C; Future  -     Vitamin D; Future  -     T4, free; Future    Type 2 diabetes mellitus without complication, without long-term current use of insulin  -     Hemoglobin A1C; Future    Exposure to Covid-19 Virus  -     COVID-19 (SARS CoV-2) IgG Antibody; Future    Hyperlipidemia, unspecified hyperlipidemia type  -     Lipid Panel; Future  -     Continue pravastatin (PRAVACHOL) 20 MG tablet; Take 1 tablet (20 mg total) by mouth once daily. For Cholesterol, refilled.    Essential hypertension  -     Within goal.  Continue verapamiL (CALAN-SR) 240 MG CR tablet; TAKE 1 TABLET (240 MG TOTAL) BY MOUTH ONCE DAILY, refilled.  -     Continue triamterene-hydrochlorothiazide 37.5-25 mg (DYAZIDE) 37.5-25 mg per capsule; Take 1 capsule by mouth once daily, refilled.    Secondary hypothyroidism  -     TSH; Future  -     T4, free; Future  -     Continue current regimen.    Insomnia, unspecified type        -      Stable, continue current regimen.    Obesity, morbid, BMI 40.0-49.9        -     Encouraged continued daily aerobic exercise and weight loss.    JUAN on CPAP        -     Benefits patient, continue CPAP.

## 2020-08-03 ENCOUNTER — LAB VISIT (OUTPATIENT)
Dept: LAB | Facility: HOSPITAL | Age: 55
End: 2020-08-03
Attending: FAMILY MEDICINE
Payer: COMMERCIAL

## 2020-08-03 DIAGNOSIS — E11.9 TYPE 2 DIABETES MELLITUS WITHOUT COMPLICATION, WITHOUT LONG-TERM CURRENT USE OF INSULIN: ICD-10-CM

## 2020-08-03 DIAGNOSIS — E78.5 HYPERLIPIDEMIA, UNSPECIFIED HYPERLIPIDEMIA TYPE: ICD-10-CM

## 2020-08-03 DIAGNOSIS — Z20.822 EXPOSURE TO COVID-19 VIRUS: ICD-10-CM

## 2020-08-03 DIAGNOSIS — E03.8 SECONDARY HYPOTHYROIDISM: ICD-10-CM

## 2020-08-03 DIAGNOSIS — Z00.00 ANNUAL PHYSICAL EXAM: ICD-10-CM

## 2020-08-03 LAB
25(OH)D3+25(OH)D2 SERPL-MCNC: 39 NG/ML (ref 30–96)
ALBUMIN SERPL BCP-MCNC: 3.8 G/DL (ref 3.5–5.2)
ALP SERPL-CCNC: 81 U/L (ref 55–135)
ALT SERPL W/O P-5'-P-CCNC: 13 U/L (ref 10–44)
ANION GAP SERPL CALC-SCNC: 7 MMOL/L (ref 8–16)
AST SERPL-CCNC: 26 U/L (ref 10–40)
BASOPHILS # BLD AUTO: 0.06 K/UL (ref 0–0.2)
BASOPHILS NFR BLD: 1.1 % (ref 0–1.9)
BILIRUB SERPL-MCNC: 0.5 MG/DL (ref 0.1–1)
BUN SERPL-MCNC: 12 MG/DL (ref 6–20)
CALCIUM SERPL-MCNC: 9.7 MG/DL (ref 8.7–10.5)
CHLORIDE SERPL-SCNC: 105 MMOL/L (ref 95–110)
CHOLEST SERPL-MCNC: 160 MG/DL (ref 120–199)
CHOLEST/HDLC SERPL: 5 {RATIO} (ref 2–5)
CO2 SERPL-SCNC: 30 MMOL/L (ref 23–29)
CREAT SERPL-MCNC: 0.9 MG/DL (ref 0.5–1.4)
DIFFERENTIAL METHOD: ABNORMAL
EOSINOPHIL # BLD AUTO: 0.2 K/UL (ref 0–0.5)
EOSINOPHIL NFR BLD: 3.7 % (ref 0–8)
ERYTHROCYTE [DISTWIDTH] IN BLOOD BY AUTOMATED COUNT: 13.2 % (ref 11.5–14.5)
EST. GFR  (AFRICAN AMERICAN): >60 ML/MIN/1.73 M^2
EST. GFR  (NON AFRICAN AMERICAN): >60 ML/MIN/1.73 M^2
ESTIMATED AVG GLUCOSE: 108 MG/DL (ref 68–131)
GLUCOSE SERPL-MCNC: 84 MG/DL (ref 70–110)
HBA1C MFR BLD HPLC: 5.4 % (ref 4–5.6)
HCT VFR BLD AUTO: 44.9 % (ref 37–48.5)
HDLC SERPL-MCNC: 32 MG/DL (ref 40–75)
HDLC SERPL: 20 % (ref 20–50)
HGB BLD-MCNC: 14.8 G/DL (ref 12–16)
IMM GRANULOCYTES # BLD AUTO: 0.01 K/UL (ref 0–0.04)
IMM GRANULOCYTES NFR BLD AUTO: 0.2 % (ref 0–0.5)
LDLC SERPL CALC-MCNC: 95.8 MG/DL (ref 63–159)
LYMPHOCYTES # BLD AUTO: 3.9 K/UL (ref 1–4.8)
LYMPHOCYTES NFR BLD: 68.3 % (ref 18–48)
MCH RBC QN AUTO: 30.1 PG (ref 27–31)
MCHC RBC AUTO-ENTMCNC: 33 G/DL (ref 32–36)
MCV RBC AUTO: 91 FL (ref 82–98)
MONOCYTES # BLD AUTO: 0.2 K/UL (ref 0.3–1)
MONOCYTES NFR BLD: 3.9 % (ref 4–15)
NEUTROPHILS # BLD AUTO: 1.3 K/UL (ref 1.8–7.7)
NEUTROPHILS NFR BLD: 22.8 % (ref 38–73)
NONHDLC SERPL-MCNC: 128 MG/DL
NRBC BLD-RTO: 0 /100 WBC
PLATELET # BLD AUTO: 259 K/UL (ref 150–350)
PMV BLD AUTO: 10.8 FL (ref 9.2–12.9)
POTASSIUM SERPL-SCNC: 3.6 MMOL/L (ref 3.5–5.1)
PROT SERPL-MCNC: 7.5 G/DL (ref 6–8.4)
RBC # BLD AUTO: 4.91 M/UL (ref 4–5.4)
SARS-COV-2 IGG SERPLBLD QL IA.RAPID: POSITIVE
SODIUM SERPL-SCNC: 142 MMOL/L (ref 136–145)
T4 FREE SERPL-MCNC: 1.08 NG/DL (ref 0.71–1.51)
TRIGL SERPL-MCNC: 161 MG/DL (ref 30–150)
TSH SERPL DL<=0.005 MIU/L-ACNC: <0.01 UIU/ML (ref 0.4–4)
WBC # BLD AUTO: 5.71 K/UL (ref 3.9–12.7)

## 2020-08-03 PROCEDURE — 36415 COLL VENOUS BLD VENIPUNCTURE: CPT

## 2020-08-03 PROCEDURE — 84443 ASSAY THYROID STIM HORMONE: CPT

## 2020-08-03 PROCEDURE — 80053 COMPREHEN METABOLIC PANEL: CPT

## 2020-08-03 PROCEDURE — 80061 LIPID PANEL: CPT

## 2020-08-03 PROCEDURE — 83036 HEMOGLOBIN GLYCOSYLATED A1C: CPT

## 2020-08-03 PROCEDURE — 86769 SARS-COV-2 COVID-19 ANTIBODY: CPT

## 2020-08-03 PROCEDURE — 84439 ASSAY OF FREE THYROXINE: CPT

## 2020-08-03 PROCEDURE — 82306 VITAMIN D 25 HYDROXY: CPT

## 2020-08-03 PROCEDURE — 85025 COMPLETE CBC W/AUTO DIFF WBC: CPT

## 2020-08-04 ENCOUNTER — TELEPHONE (OUTPATIENT)
Dept: INTERNAL MEDICINE | Facility: CLINIC | Age: 55
End: 2020-08-04

## 2020-08-04 DIAGNOSIS — E11.9 TYPE 2 DIABETES MELLITUS WITHOUT COMPLICATION, WITHOUT LONG-TERM CURRENT USE OF INSULIN: Primary | ICD-10-CM

## 2020-09-03 DIAGNOSIS — E11.9 TYPE 2 DIABETES MELLITUS WITHOUT COMPLICATION: ICD-10-CM

## 2020-10-05 ENCOUNTER — PATIENT MESSAGE (OUTPATIENT)
Dept: ADMINISTRATIVE | Facility: HOSPITAL | Age: 55
End: 2020-10-05

## 2020-10-21 ENCOUNTER — OFFICE VISIT (OUTPATIENT)
Dept: INTERNAL MEDICINE | Facility: CLINIC | Age: 55
End: 2020-10-21
Payer: COMMERCIAL

## 2020-10-21 VITALS
OXYGEN SATURATION: 96 % | SYSTOLIC BLOOD PRESSURE: 120 MMHG | HEART RATE: 62 BPM | BODY MASS INDEX: 41.17 KG/M2 | DIASTOLIC BLOOD PRESSURE: 76 MMHG | HEIGHT: 60 IN | TEMPERATURE: 99 F | WEIGHT: 209.69 LBS

## 2020-10-21 DIAGNOSIS — Z01.818 PRE-OPERATIVE EXAMINATION: Primary | ICD-10-CM

## 2020-10-21 PROCEDURE — 3074F SYST BP LT 130 MM HG: CPT | Mod: CPTII,S$GLB,, | Performed by: FAMILY MEDICINE

## 2020-10-21 PROCEDURE — 99999 PR PBB SHADOW E&M-EST. PATIENT-LVL III: ICD-10-PCS | Mod: PBBFAC,,, | Performed by: FAMILY MEDICINE

## 2020-10-21 PROCEDURE — 3078F PR MOST RECENT DIASTOLIC BLOOD PRESSURE < 80 MM HG: ICD-10-PCS | Mod: CPTII,S$GLB,, | Performed by: FAMILY MEDICINE

## 2020-10-21 PROCEDURE — 3008F BODY MASS INDEX DOCD: CPT | Mod: CPTII,S$GLB,, | Performed by: FAMILY MEDICINE

## 2020-10-21 PROCEDURE — 99213 PR OFFICE/OUTPT VISIT, EST, LEVL III, 20-29 MIN: ICD-10-PCS | Mod: S$GLB,,, | Performed by: FAMILY MEDICINE

## 2020-10-21 PROCEDURE — 99999 PR PBB SHADOW E&M-EST. PATIENT-LVL III: CPT | Mod: PBBFAC,,, | Performed by: FAMILY MEDICINE

## 2020-10-21 PROCEDURE — 3008F PR BODY MASS INDEX (BMI) DOCUMENTED: ICD-10-PCS | Mod: CPTII,S$GLB,, | Performed by: FAMILY MEDICINE

## 2020-10-21 PROCEDURE — 3078F DIAST BP <80 MM HG: CPT | Mod: CPTII,S$GLB,, | Performed by: FAMILY MEDICINE

## 2020-10-21 PROCEDURE — 99213 OFFICE O/P EST LOW 20 MIN: CPT | Mod: S$GLB,,, | Performed by: FAMILY MEDICINE

## 2020-10-21 PROCEDURE — 3074F PR MOST RECENT SYSTOLIC BLOOD PRESSURE < 130 MM HG: ICD-10-PCS | Mod: CPTII,S$GLB,, | Performed by: FAMILY MEDICINE

## 2020-10-21 NOTE — PROGRESS NOTES
Subjective:      Patient ID: Matt Lomax is a 54 y.o. female.    Chief Complaint: Pre-op Exam      HPI:  Matt Lomax is a 54 year old female with well controlled DM 2, history of secondary hypogonadism, hyperlipidemia, hyperprolactinemia, hypertension, hypothyroidism, impaired glucose tolerance, morbid obesity, JUAN on CPAP, pituitary adenoma, and secondary adrenal insufficiency who presents to clinic today for a preoperative exam.    To have foot surgery on 11/10/20 for Hallux valgus, metatarsus adductus, and rear foot valgus deformities per Dr. Madrid.      ?Can take care of self, such as eat, dress, or use the toilet (1 MET):  YES  ?Can walk up a flight of steps or a hill or walk on level ground at 3 to 4 mph (4 METs):  YES  ?Can do heavy work around the house, such as scrubbing floors or lifting or moving heavy furniture, or climb two flights of stairs (between 4 and 10 METs):  YES  ?Can participate in strenuous sports such as swimming, singles tennis, football, basketball, and skiing (>10 METs):  YES    RCRI:  0 pts; 3.9% 30 day cardiac risk.    Denies any recent chest pain, shortness of breath, or palpitations.  Most recent EKG on file 19 showed sinus bradycardia (vent rate 58), nonspecific T wave abnormality, prolonged QT.    Had treadmill stress test which was negative for ischemia 19.      Past Medical History:   Diagnosis Date    HTN (hypertension)     Hyperprolactinemia     Morbid obesity     JUAN (obstructive sleep apnea)     Pituitary adenoma     Secondary hypothyroidism 2015    Type II or unspecified type diabetes mellitus without mention of complication, uncontrolled     Vitamin D deficiency disease        Past Surgical History:   Procedure Laterality Date    Breast Reduction       SECTION      x 3    COLONOSCOPY N/A 2017    Procedure: COLONOSCOPY;  Surgeon: Ciro Rebolledo MD;  Location: [facility] ([address] [place] [address]);  Service: Endoscopy;  Laterality: N/A;     TOTAL REDUCTION MAMMOPLASTY Bilateral 2010    TRANSPHENOIDAL PITUITARY RESECTION  07/21/14    TUBAL LIGATION         Family History   Problem Relation Age of Onset    Diabetes Mother     Hypertension Mother     Heart disease Mother     Hypertension Father     Diabetes Maternal Aunt     No Known Problems Brother     No Known Problems Daughter     No Known Problems Son     No Known Problems Brother     No Known Problems Daughter     No Known Problems Sister     No Known Problems Maternal Uncle     No Known Problems Paternal Aunt     No Known Problems Paternal Uncle     No Known Problems Maternal Grandmother     No Known Problems Maternal Grandfather     No Known Problems Paternal Grandmother     No Known Problems Paternal Grandfather     Breast cancer Neg Hx     Colon cancer Neg Hx     Ovarian cancer Neg Hx     Amblyopia Neg Hx     Blindness Neg Hx     Cancer Neg Hx     Cataracts Neg Hx     Glaucoma Neg Hx     Macular degeneration Neg Hx     Retinal detachment Neg Hx     Strabismus Neg Hx     Stroke Neg Hx     Thyroid disease Neg Hx        Social History     Socioeconomic History    Marital status:      Spouse name: Not on file    Number of children: Not on file    Years of education: Not on file    Highest education level: Not on file   Occupational History    Occupation:      Employer: [employer]   Social Needs    Financial resource strain: Not hard at all    Food insecurity     Worry: Never true     Inability: Never true    Transportation needs     Medical: No     Non-medical: No   Tobacco Use    Smoking status: Never Smoker    Smokeless tobacco: Never Used   Substance and Sexual Activity    Alcohol use: No     Frequency: Never     Drinks per session: Patient refused     Binge frequency: Never    Drug use: No    Sexual activity: Not Currently     Partners: Male     Birth control/protection: Surgical, Post-menopausal   Lifestyle     Physical activity     Days per week: 5 days     Minutes per session: Not on file    Stress: Not at all   Relationships    Social connections     Talks on phone: More than three times a week     Gets together: Never     Attends Anabaptism service: Not on file     Active member of club or organization: Yes     Attends meetings of clubs or organizations: More than 4 times per year     Relationship status:    Other Topics Concern    Not on file   Social History Narrative    Not on file       Review of Systems   Constitutional: Negative for chills, fatigue and fever.   HENT: Negative for congestion, hearing loss, nosebleeds, rhinorrhea, sore throat and trouble swallowing.    Eyes: Negative for pain and visual disturbance.   Respiratory: Negative for cough, shortness of breath and wheezing.    Cardiovascular: Negative for chest pain and palpitations.   Gastrointestinal: Negative for abdominal distention, abdominal pain, constipation, diarrhea, nausea and vomiting.   Genitourinary: Negative for decreased urine volume, difficulty urinating, dysuria, hematuria and urgency.   Musculoskeletal: Negative for arthralgias, back pain and myalgias.   Skin: Negative for color change and rash.   Neurological: Negative for dizziness, tremors, weakness, light-headedness, numbness and headaches.   Psychiatric/Behavioral: Negative for agitation, behavioral problems and confusion. The patient is not nervous/anxious.      Objective:     Vitals:    10/21/20 1017   BP: 120/76   BP Location: Left arm   Patient Position: Sitting   BP Method: Large (Manual)   Pulse: 62   Temp: 98.5 °F (36.9 °C)   TempSrc: Oral   SpO2: 96%   Weight: 95.1 kg (209 lb 10.5 oz)   Height: 5' (1.524 m)       Physical Exam  Vitals signs and nursing note reviewed.   Constitutional:       Appearance: She is well-developed.   HENT:      Head: Normocephalic and atraumatic.      Right Ear: External ear normal.      Left Ear: External ear normal.      Nose: Nose  normal.   Eyes:      Conjunctiva/sclera: Conjunctivae normal.   Neck:      Musculoskeletal: Neck supple.      Trachea: No tracheal deviation.   Cardiovascular:      Rate and Rhythm: Normal rate and regular rhythm.      Heart sounds: Normal heart sounds. No murmur. No friction rub. No gallop.    Pulmonary:      Effort: Pulmonary effort is normal. No respiratory distress.      Breath sounds: Normal breath sounds. No wheezing or rales.   Abdominal:      General: Bowel sounds are normal. There is no distension.      Palpations: Abdomen is soft.      Tenderness: There is no abdominal tenderness. There is no guarding or rebound.   Musculoskeletal:         General: No deformity.   Skin:     General: Skin is warm and dry.   Neurological:      Mental Status: She is alert and oriented to person, place, and time.   Psychiatric:         Behavior: Behavior normal.        Assessment:      1. Pre-operative examination      Plan:   Matt was seen today for pre-op exam.    Diagnoses and all orders for this visit:    Pre-operative examination        -     Cleared for surgery/anesthesia.  Form completed and to be faxed to podiatry.

## 2020-11-04 ENCOUNTER — TELEPHONE (OUTPATIENT)
Dept: INTERNAL MEDICINE | Facility: CLINIC | Age: 55
End: 2020-11-04

## 2020-11-04 DIAGNOSIS — E11.9 TYPE 2 DIABETES MELLITUS WITHOUT COMPLICATION, WITHOUT LONG-TERM CURRENT USE OF INSULIN: Primary | ICD-10-CM

## 2020-11-09 ENCOUNTER — PATIENT MESSAGE (OUTPATIENT)
Dept: INTERNAL MEDICINE | Facility: CLINIC | Age: 55
End: 2020-11-09

## 2020-11-09 NOTE — TELEPHONE ENCOUNTER
Please fax copy of EKG we have on file from 6/18/19 to  attention to Kaylah at patient's request.  Please notify patient once complete.

## 2020-12-19 DIAGNOSIS — E22.1 HYPERPROLACTINEMIA: ICD-10-CM

## 2020-12-21 RX ORDER — HYDROCORTISONE 10 MG/1
TABLET ORAL
Qty: 45 TABLET | Refills: 5 | Status: SHIPPED | OUTPATIENT
Start: 2020-12-21 | End: 2021-02-17 | Stop reason: SDUPTHER

## 2021-01-25 ENCOUNTER — TELEPHONE (OUTPATIENT)
Dept: ENDOCRINOLOGY | Facility: CLINIC | Age: 56
End: 2021-01-25

## 2021-02-15 ENCOUNTER — TELEPHONE (OUTPATIENT)
Dept: ENDOCRINOLOGY | Facility: CLINIC | Age: 56
End: 2021-02-15

## 2021-02-15 ENCOUNTER — PATIENT OUTREACH (OUTPATIENT)
Dept: ADMINISTRATIVE | Facility: OTHER | Age: 56
End: 2021-02-15

## 2021-02-15 ENCOUNTER — PATIENT MESSAGE (OUTPATIENT)
Dept: ENDOCRINOLOGY | Facility: CLINIC | Age: 56
End: 2021-02-15

## 2021-02-17 ENCOUNTER — OFFICE VISIT (OUTPATIENT)
Dept: ENDOCRINOLOGY | Facility: CLINIC | Age: 56
End: 2021-02-17
Payer: COMMERCIAL

## 2021-02-17 ENCOUNTER — TELEPHONE (OUTPATIENT)
Dept: ENDOCRINOLOGY | Facility: CLINIC | Age: 56
End: 2021-02-17

## 2021-02-17 ENCOUNTER — PATIENT MESSAGE (OUTPATIENT)
Dept: ENDOCRINOLOGY | Facility: CLINIC | Age: 56
End: 2021-02-17

## 2021-02-17 DIAGNOSIS — E27.49 SECONDARY ADRENAL INSUFFICIENCY: ICD-10-CM

## 2021-02-17 DIAGNOSIS — E03.9 HYPOTHYROIDISM, UNSPECIFIED TYPE: ICD-10-CM

## 2021-02-17 DIAGNOSIS — Z86.39 H/O SECONDARY HYPOGONADISM: ICD-10-CM

## 2021-02-17 DIAGNOSIS — D35.2 PITUITARY ADENOMA: Primary | ICD-10-CM

## 2021-02-17 DIAGNOSIS — E66.01 OBESITY, MORBID, BMI 40.0-49.9: ICD-10-CM

## 2021-02-17 DIAGNOSIS — E22.1 HYPERPROLACTINEMIA: ICD-10-CM

## 2021-02-17 DIAGNOSIS — E03.8 SECONDARY HYPOTHYROIDISM: ICD-10-CM

## 2021-02-17 PROCEDURE — 99214 OFFICE O/P EST MOD 30 MIN: CPT | Mod: 95,,, | Performed by: INTERNAL MEDICINE

## 2021-02-17 PROCEDURE — 99214 PR OFFICE/OUTPT VISIT, EST, LEVL IV, 30-39 MIN: ICD-10-PCS | Mod: 95,,, | Performed by: INTERNAL MEDICINE

## 2021-02-17 RX ORDER — LEVOTHYROXINE SODIUM 75 UG/1
75 TABLET ORAL
Qty: 90 TABLET | Refills: 3 | Status: SHIPPED | OUTPATIENT
Start: 2021-02-17 | End: 2021-02-17 | Stop reason: SDUPTHER

## 2021-02-17 RX ORDER — HYDROCORTISONE 10 MG/1
TABLET ORAL
Qty: 45 TABLET | Refills: 11 | Status: SHIPPED | OUTPATIENT
Start: 2021-02-17 | End: 2022-04-11 | Stop reason: SDUPTHER

## 2021-02-17 RX ORDER — LEVOTHYROXINE SODIUM 75 UG/1
75 TABLET ORAL
Qty: 90 TABLET | Refills: 3 | Status: SHIPPED | OUTPATIENT
Start: 2021-02-17 | End: 2022-04-11 | Stop reason: SDUPTHER

## 2021-03-02 ENCOUNTER — HOSPITAL ENCOUNTER (OUTPATIENT)
Dept: RADIOLOGY | Facility: HOSPITAL | Age: 56
Discharge: HOME | End: 2021-03-02
Attending: INTERNAL MEDICINE
Payer: COMMERCIAL

## 2021-03-02 ENCOUNTER — PATIENT MESSAGE (OUTPATIENT)
Dept: ENDOCRINOLOGY | Facility: CLINIC | Age: 56
End: 2021-03-02

## 2021-03-02 ENCOUNTER — OFFICE VISIT (OUTPATIENT)
Dept: NEUROSURGERY | Facility: CLINIC | Age: 56
End: 2021-03-02
Payer: COMMERCIAL

## 2021-03-02 ENCOUNTER — PATIENT MESSAGE (OUTPATIENT)
Dept: NEUROSURGERY | Facility: CLINIC | Age: 56
End: 2021-03-02

## 2021-03-02 VITALS — BODY MASS INDEX: 41.76 KG/M2 | WEIGHT: 213.88 LBS

## 2021-03-02 DIAGNOSIS — D35.2 PITUITARY ADENOMA: ICD-10-CM

## 2021-03-02 DIAGNOSIS — D35.2 PITUITARY ADENOMA: Primary | ICD-10-CM

## 2021-03-02 DIAGNOSIS — E22.1 HYPERPROLACTINEMIA: ICD-10-CM

## 2021-03-02 PROCEDURE — 3008F BODY MASS INDEX DOCD: CPT | Mod: CPTII,S$GLB,, | Performed by: NEUROLOGICAL SURGERY

## 2021-03-02 PROCEDURE — 99203 OFFICE O/P NEW LOW 30 MIN: CPT | Mod: S$GLB,,, | Performed by: NEUROLOGICAL SURGERY

## 2021-03-02 PROCEDURE — 99999 PR PBB SHADOW E&M-EST. PATIENT-LVL III: CPT | Mod: PBBFAC,,, | Performed by: NEUROLOGICAL SURGERY

## 2021-03-02 PROCEDURE — 70553 MRI BRAIN STEM W/O & W/DYE: CPT | Mod: 26,,, | Performed by: RADIOLOGY

## 2021-03-02 PROCEDURE — A9585 GADOBUTROL INJECTION: HCPCS | Performed by: INTERNAL MEDICINE

## 2021-03-02 PROCEDURE — 3008F PR BODY MASS INDEX (BMI) DOCUMENTED: ICD-10-PCS | Mod: CPTII,S$GLB,, | Performed by: NEUROLOGICAL SURGERY

## 2021-03-02 PROCEDURE — 25500020 PHARM REV CODE 255: Performed by: INTERNAL MEDICINE

## 2021-03-02 PROCEDURE — 70553 MRI BRAIN W WO CONTRAST: ICD-10-PCS | Mod: 26,,, | Performed by: RADIOLOGY

## 2021-03-02 PROCEDURE — 1126F PR PAIN SEVERITY QUANTIFIED, NO PAIN PRESENT: ICD-10-PCS | Mod: S$GLB,,, | Performed by: NEUROLOGICAL SURGERY

## 2021-03-02 PROCEDURE — 99999 PR PBB SHADOW E&M-EST. PATIENT-LVL III: ICD-10-PCS | Mod: PBBFAC,,, | Performed by: NEUROLOGICAL SURGERY

## 2021-03-02 PROCEDURE — 70553 MRI BRAIN STEM W/O & W/DYE: CPT | Mod: TC

## 2021-03-02 PROCEDURE — 1126F AMNT PAIN NOTED NONE PRSNT: CPT | Mod: S$GLB,,, | Performed by: NEUROLOGICAL SURGERY

## 2021-03-02 PROCEDURE — 99203 PR OFFICE/OUTPT VISIT, NEW, LEVL III, 30-44 MIN: ICD-10-PCS | Mod: S$GLB,,, | Performed by: NEUROLOGICAL SURGERY

## 2021-03-02 RX ORDER — GADOBUTROL 604.72 MG/ML
10 INJECTION INTRAVENOUS
Status: COMPLETED | OUTPATIENT
Start: 2021-03-02 | End: 2021-03-02

## 2021-03-02 RX ADMIN — GADOBUTROL 10 ML: 604.72 INJECTION INTRAVENOUS at 08:03

## 2021-03-10 DIAGNOSIS — Z12.31 OTHER SCREENING MAMMOGRAM: ICD-10-CM

## 2021-03-16 ENCOUNTER — PATIENT MESSAGE (OUTPATIENT)
Dept: OPTOMETRY | Facility: CLINIC | Age: 56
End: 2021-03-16

## 2021-03-16 ENCOUNTER — OFFICE VISIT (OUTPATIENT)
Dept: OPTOMETRY | Facility: CLINIC | Age: 56
End: 2021-03-16
Payer: COMMERCIAL

## 2021-03-16 DIAGNOSIS — H52.203 MYOPIA WITH ASTIGMATISM AND PRESBYOPIA, BILATERAL: Primary | ICD-10-CM

## 2021-03-16 DIAGNOSIS — H52.4 MYOPIA WITH ASTIGMATISM AND PRESBYOPIA, BILATERAL: Primary | ICD-10-CM

## 2021-03-16 DIAGNOSIS — D35.2 PITUITARY ADENOMA: ICD-10-CM

## 2021-03-16 DIAGNOSIS — H35.033 HYPERTENSIVE RETINOPATHY OF BOTH EYES: ICD-10-CM

## 2021-03-16 DIAGNOSIS — H52.13 MYOPIA WITH ASTIGMATISM AND PRESBYOPIA, BILATERAL: Primary | ICD-10-CM

## 2021-03-16 DIAGNOSIS — H25.13 NUCLEAR SCLEROSIS OF BOTH EYES: ICD-10-CM

## 2021-03-16 DIAGNOSIS — H04.123 BILATERAL DRY EYES: ICD-10-CM

## 2021-03-16 DIAGNOSIS — E11.9 DIABETES MELLITUS TYPE 2 WITHOUT RETINOPATHY: ICD-10-CM

## 2021-03-16 PROCEDURE — 92014 PR EYE EXAM, EST PATIENT,COMPREHESV: ICD-10-PCS | Mod: S$GLB,,, | Performed by: OPTOMETRIST

## 2021-03-16 PROCEDURE — 99999 PR PBB SHADOW E&M-EST. PATIENT-LVL III: CPT | Mod: PBBFAC,,, | Performed by: OPTOMETRIST

## 2021-03-16 PROCEDURE — 92014 COMPRE OPH EXAM EST PT 1/>: CPT | Mod: S$GLB,,, | Performed by: OPTOMETRIST

## 2021-03-16 PROCEDURE — 92015 DETERMINE REFRACTIVE STATE: CPT | Mod: S$GLB,,, | Performed by: OPTOMETRIST

## 2021-03-16 PROCEDURE — 92015 PR REFRACTION: ICD-10-PCS | Mod: S$GLB,,, | Performed by: OPTOMETRIST

## 2021-03-16 PROCEDURE — 99999 PR PBB SHADOW E&M-EST. PATIENT-LVL III: ICD-10-PCS | Mod: PBBFAC,,, | Performed by: OPTOMETRIST

## 2021-03-17 ENCOUNTER — TELEPHONE (OUTPATIENT)
Dept: OPTOMETRY | Facility: CLINIC | Age: 56
End: 2021-03-17

## 2021-04-05 ENCOUNTER — PATIENT MESSAGE (OUTPATIENT)
Dept: ADMINISTRATIVE | Facility: HOSPITAL | Age: 56
End: 2021-04-05

## 2021-06-17 ENCOUNTER — TELEPHONE (OUTPATIENT)
Dept: SLEEP MEDICINE | Facility: CLINIC | Age: 56
End: 2021-06-17

## 2021-06-17 ENCOUNTER — OCCUPATIONAL HEALTH (OUTPATIENT)
Dept: URGENT CARE | Facility: CLINIC | Age: 56
End: 2021-06-17

## 2021-06-17 ENCOUNTER — PATIENT MESSAGE (OUTPATIENT)
Dept: SLEEP MEDICINE | Facility: CLINIC | Age: 56
End: 2021-06-17

## 2021-06-17 DIAGNOSIS — Z00.00 PE (PHYSICAL EXAM), ROUTINE: Primary | ICD-10-CM

## 2021-06-17 PROCEDURE — 99499 UNLISTED E&M SERVICE: CPT | Mod: S$GLB,,, | Performed by: NURSE PRACTITIONER

## 2021-06-17 PROCEDURE — 99499 DOT PHYSICAL: ICD-10-PCS | Mod: S$GLB,,, | Performed by: NURSE PRACTITIONER

## 2021-06-30 ENCOUNTER — HOSPITAL ENCOUNTER (OUTPATIENT)
Dept: RADIOLOGY | Facility: HOSPITAL | Age: 56
Discharge: HOME | End: 2021-06-30
Attending: FAMILY MEDICINE
Payer: COMMERCIAL

## 2021-06-30 DIAGNOSIS — Z12.31 OTHER SCREENING MAMMOGRAM: ICD-10-CM

## 2021-06-30 PROCEDURE — 77063 MAMMO DIGITAL SCREENING BILAT WITH TOMO: ICD-10-PCS | Mod: 26,,, | Performed by: RADIOLOGY

## 2021-06-30 PROCEDURE — 77067 MAMMO DIGITAL SCREENING BILAT WITH TOMO: ICD-10-PCS | Mod: 26,,, | Performed by: RADIOLOGY

## 2021-06-30 PROCEDURE — 77067 SCR MAMMO BI INCL CAD: CPT | Mod: 26,,, | Performed by: RADIOLOGY

## 2021-06-30 PROCEDURE — 77063 BREAST TOMOSYNTHESIS BI: CPT | Mod: 26,,, | Performed by: RADIOLOGY

## 2021-06-30 PROCEDURE — 77067 SCR MAMMO BI INCL CAD: CPT | Mod: TC

## 2021-07-06 ENCOUNTER — PATIENT MESSAGE (OUTPATIENT)
Dept: ADMINISTRATIVE | Facility: HOSPITAL | Age: 56
End: 2021-07-06

## 2021-07-28 ENCOUNTER — PATIENT OUTREACH (OUTPATIENT)
Dept: ADMINISTRATIVE | Facility: OTHER | Age: 56
End: 2021-07-28

## 2021-07-29 ENCOUNTER — OFFICE VISIT (OUTPATIENT)
Dept: SLEEP MEDICINE | Facility: CLINIC | Age: 56
End: 2021-07-29
Payer: COMMERCIAL

## 2021-07-29 VITALS
SYSTOLIC BLOOD PRESSURE: 128 MMHG | HEART RATE: 66 BPM | WEIGHT: 222.88 LBS | HEIGHT: 60 IN | BODY MASS INDEX: 43.76 KG/M2 | DIASTOLIC BLOOD PRESSURE: 76 MMHG

## 2021-07-29 DIAGNOSIS — G47.33 OSA ON CPAP: Primary | ICD-10-CM

## 2021-07-29 PROCEDURE — 3078F PR MOST RECENT DIASTOLIC BLOOD PRESSURE < 80 MM HG: ICD-10-PCS | Mod: CPTII,S$GLB,, | Performed by: NURSE PRACTITIONER

## 2021-07-29 PROCEDURE — 1159F PR MEDICATION LIST DOCUMENTED IN MEDICAL RECORD: ICD-10-PCS | Mod: CPTII,S$GLB,, | Performed by: NURSE PRACTITIONER

## 2021-07-29 PROCEDURE — 1126F AMNT PAIN NOTED NONE PRSNT: CPT | Mod: CPTII,S$GLB,, | Performed by: NURSE PRACTITIONER

## 2021-07-29 PROCEDURE — 99213 OFFICE O/P EST LOW 20 MIN: CPT | Mod: S$GLB,,, | Performed by: NURSE PRACTITIONER

## 2021-07-29 PROCEDURE — 3074F PR MOST RECENT SYSTOLIC BLOOD PRESSURE < 130 MM HG: ICD-10-PCS | Mod: CPTII,S$GLB,, | Performed by: NURSE PRACTITIONER

## 2021-07-29 PROCEDURE — 1159F MED LIST DOCD IN RCRD: CPT | Mod: CPTII,S$GLB,, | Performed by: NURSE PRACTITIONER

## 2021-07-29 PROCEDURE — 99999 PR PBB SHADOW E&M-EST. PATIENT-LVL III: CPT | Mod: PBBFAC,,, | Performed by: NURSE PRACTITIONER

## 2021-07-29 PROCEDURE — 1126F PR PAIN SEVERITY QUANTIFIED, NO PAIN PRESENT: ICD-10-PCS | Mod: CPTII,S$GLB,, | Performed by: NURSE PRACTITIONER

## 2021-07-29 PROCEDURE — 99213 PR OFFICE/OUTPT VISIT, EST, LEVL III, 20-29 MIN: ICD-10-PCS | Mod: S$GLB,,, | Performed by: NURSE PRACTITIONER

## 2021-07-29 PROCEDURE — 3078F DIAST BP <80 MM HG: CPT | Mod: CPTII,S$GLB,, | Performed by: NURSE PRACTITIONER

## 2021-07-29 PROCEDURE — 3074F SYST BP LT 130 MM HG: CPT | Mod: CPTII,S$GLB,, | Performed by: NURSE PRACTITIONER

## 2021-07-29 PROCEDURE — 99999 PR PBB SHADOW E&M-EST. PATIENT-LVL III: ICD-10-PCS | Mod: PBBFAC,,, | Performed by: NURSE PRACTITIONER

## 2021-07-29 PROCEDURE — 3008F BODY MASS INDEX DOCD: CPT | Mod: CPTII,S$GLB,, | Performed by: NURSE PRACTITIONER

## 2021-07-29 PROCEDURE — 3008F PR BODY MASS INDEX (BMI) DOCUMENTED: ICD-10-PCS | Mod: CPTII,S$GLB,, | Performed by: NURSE PRACTITIONER

## 2021-08-03 ENCOUNTER — PATIENT MESSAGE (OUTPATIENT)
Dept: ADMINISTRATIVE | Facility: HOSPITAL | Age: 56
End: 2021-08-03

## 2021-08-07 DIAGNOSIS — E78.5 HYPERLIPIDEMIA, UNSPECIFIED HYPERLIPIDEMIA TYPE: ICD-10-CM

## 2021-08-07 DIAGNOSIS — I10 ESSENTIAL HYPERTENSION: ICD-10-CM

## 2021-08-09 RX ORDER — PRAVASTATIN SODIUM 20 MG/1
20 TABLET ORAL DAILY
Qty: 90 TABLET | Refills: 3 | Status: SHIPPED | OUTPATIENT
Start: 2021-08-09 | End: 2022-03-31 | Stop reason: SDUPTHER

## 2021-08-09 RX ORDER — VERAPAMIL HYDROCHLORIDE 240 MG/1
TABLET, FILM COATED, EXTENDED RELEASE ORAL
Qty: 90 TABLET | Refills: 3 | Status: SHIPPED | OUTPATIENT
Start: 2021-08-09 | End: 2022-03-31 | Stop reason: SDUPTHER

## 2021-08-09 RX ORDER — TRIAMTERENE AND HYDROCHLOROTHIAZIDE 37.5; 25 MG/1; MG/1
1 CAPSULE ORAL DAILY
Qty: 90 CAPSULE | Refills: 3 | Status: SHIPPED | OUTPATIENT
Start: 2021-08-09 | End: 2022-03-31 | Stop reason: SDUPTHER

## 2021-09-03 ENCOUNTER — PATIENT MESSAGE (OUTPATIENT)
Dept: NEUROSURGERY | Facility: CLINIC | Age: 56
End: 2021-09-03

## 2021-10-04 ENCOUNTER — PATIENT MESSAGE (OUTPATIENT)
Dept: ADMINISTRATIVE | Facility: HOSPITAL | Age: 56
End: 2021-10-04

## 2021-10-13 DIAGNOSIS — E11.9 TYPE 2 DIABETES MELLITUS WITHOUT COMPLICATION: ICD-10-CM

## 2022-03-31 ENCOUNTER — IMMUNIZATION (OUTPATIENT)
Dept: INTERNAL MEDICINE | Facility: CLINIC | Age: 57
End: 2022-03-31
Payer: COMMERCIAL

## 2022-03-31 ENCOUNTER — LAB VISIT (OUTPATIENT)
Dept: LAB | Facility: HOSPITAL | Age: 57
End: 2022-03-31
Payer: COMMERCIAL

## 2022-03-31 ENCOUNTER — OFFICE VISIT (OUTPATIENT)
Dept: INTERNAL MEDICINE | Facility: CLINIC | Age: 57
End: 2022-03-31
Payer: COMMERCIAL

## 2022-03-31 VITALS
BODY MASS INDEX: 43.11 KG/M2 | DIASTOLIC BLOOD PRESSURE: 82 MMHG | HEIGHT: 60 IN | SYSTOLIC BLOOD PRESSURE: 130 MMHG | HEART RATE: 61 BPM | OXYGEN SATURATION: 98 % | TEMPERATURE: 99 F | WEIGHT: 219.56 LBS

## 2022-03-31 DIAGNOSIS — E03.8 SECONDARY HYPOTHYROIDISM: ICD-10-CM

## 2022-03-31 DIAGNOSIS — Z00.00 ANNUAL PHYSICAL EXAM: ICD-10-CM

## 2022-03-31 DIAGNOSIS — I10 ESSENTIAL HYPERTENSION: ICD-10-CM

## 2022-03-31 DIAGNOSIS — E66.01 OBESITY, MORBID, BMI 40.0-49.9: ICD-10-CM

## 2022-03-31 DIAGNOSIS — Z11.59 NEED FOR HEPATITIS C SCREENING TEST: ICD-10-CM

## 2022-03-31 DIAGNOSIS — G47.33 OSA ON CPAP: ICD-10-CM

## 2022-03-31 DIAGNOSIS — E78.5 HYPERLIPIDEMIA, UNSPECIFIED HYPERLIPIDEMIA TYPE: ICD-10-CM

## 2022-03-31 DIAGNOSIS — E27.49 SECONDARY ADRENAL INSUFFICIENCY: ICD-10-CM

## 2022-03-31 DIAGNOSIS — D35.2 PITUITARY ADENOMA: ICD-10-CM

## 2022-03-31 DIAGNOSIS — Z86.39 HISTORY OF DIABETES MELLITUS, TYPE II: ICD-10-CM

## 2022-03-31 DIAGNOSIS — G47.00 INSOMNIA, UNSPECIFIED TYPE: ICD-10-CM

## 2022-03-31 LAB
ALBUMIN SERPL BCP-MCNC: 4 G/DL (ref 3.5–5.2)
ALP SERPL-CCNC: 94 U/L (ref 55–135)
ALT SERPL W/O P-5'-P-CCNC: 11 U/L (ref 10–44)
ANION GAP SERPL CALC-SCNC: 9 MMOL/L (ref 8–16)
AST SERPL-CCNC: 23 U/L (ref 10–40)
BASOPHILS # BLD AUTO: 0.07 K/UL (ref 0–0.2)
BASOPHILS NFR BLD: 1.2 % (ref 0–1.9)
BILIRUB SERPL-MCNC: 0.4 MG/DL (ref 0.1–1)
BUN SERPL-MCNC: 11 MG/DL (ref 6–20)
CALCIUM SERPL-MCNC: 10.1 MG/DL (ref 8.7–10.5)
CHLORIDE SERPL-SCNC: 101 MMOL/L (ref 95–110)
CHOLEST SERPL-MCNC: 162 MG/DL (ref 120–199)
CHOLEST/HDLC SERPL: 4.4 {RATIO} (ref 2–5)
CO2 SERPL-SCNC: 29 MMOL/L (ref 23–29)
CREAT SERPL-MCNC: 0.8 MG/DL (ref 0.5–1.4)
DIFFERENTIAL METHOD: ABNORMAL
EOSINOPHIL # BLD AUTO: 0.2 K/UL (ref 0–0.5)
EOSINOPHIL NFR BLD: 2.5 % (ref 0–8)
ERYTHROCYTE [DISTWIDTH] IN BLOOD BY AUTOMATED COUNT: 13.9 % (ref 11.5–14.5)
EST. GFR  (AFRICAN AMERICAN): >60 ML/MIN/1.73 M^2
EST. GFR  (NON AFRICAN AMERICAN): >60 ML/MIN/1.73 M^2
ESTIMATED AVG GLUCOSE: 123 MG/DL (ref 68–131)
GLUCOSE SERPL-MCNC: 87 MG/DL (ref 70–110)
HBA1C MFR BLD: 5.9 % (ref 4–5.6)
HCT VFR BLD AUTO: 43.4 % (ref 37–48.5)
HDLC SERPL-MCNC: 37 MG/DL (ref 40–75)
HDLC SERPL: 22.8 % (ref 20–50)
HGB BLD-MCNC: 14.4 G/DL (ref 12–16)
IMM GRANULOCYTES # BLD AUTO: 0.01 K/UL (ref 0–0.04)
IMM GRANULOCYTES NFR BLD AUTO: 0.2 % (ref 0–0.5)
LDLC SERPL CALC-MCNC: 99.4 MG/DL (ref 63–159)
LYMPHOCYTES # BLD AUTO: 2.8 K/UL (ref 1–4.8)
LYMPHOCYTES NFR BLD: 47.1 % (ref 18–48)
MCH RBC QN AUTO: 30.4 PG (ref 27–31)
MCHC RBC AUTO-ENTMCNC: 33.2 G/DL (ref 32–36)
MCV RBC AUTO: 92 FL (ref 82–98)
MONOCYTES # BLD AUTO: 0.2 K/UL (ref 0.3–1)
MONOCYTES NFR BLD: 3.7 % (ref 4–15)
NEUTROPHILS # BLD AUTO: 2.7 K/UL (ref 1.8–7.7)
NEUTROPHILS NFR BLD: 45.3 % (ref 38–73)
NONHDLC SERPL-MCNC: 125 MG/DL
NRBC BLD-RTO: 0 /100 WBC
PLATELET # BLD AUTO: 293 K/UL (ref 150–450)
PMV BLD AUTO: 10.3 FL (ref 9.2–12.9)
POTASSIUM SERPL-SCNC: 3.9 MMOL/L (ref 3.5–5.1)
PROT SERPL-MCNC: 7.8 G/DL (ref 6–8.4)
RBC # BLD AUTO: 4.74 M/UL (ref 4–5.4)
SODIUM SERPL-SCNC: 139 MMOL/L (ref 136–145)
T4 FREE SERPL-MCNC: 1.05 NG/DL (ref 0.71–1.51)
TRIGL SERPL-MCNC: 128 MG/DL (ref 30–150)
TSH SERPL DL<=0.005 MIU/L-ACNC: <0.01 UIU/ML (ref 0.4–4)
WBC # BLD AUTO: 5.95 K/UL (ref 3.9–12.7)

## 2022-03-31 PROCEDURE — 85025 COMPLETE CBC W/AUTO DIFF WBC: CPT | Performed by: FAMILY MEDICINE

## 2022-03-31 PROCEDURE — 90686 FLU VACCINE (QUAD) GREATER THAN OR EQUAL TO 3YO PRESERVATIVE FREE IM: ICD-10-PCS | Mod: S$GLB,,, | Performed by: FAMILY MEDICINE

## 2022-03-31 PROCEDURE — 36415 COLL VENOUS BLD VENIPUNCTURE: CPT | Performed by: FAMILY MEDICINE

## 2022-03-31 PROCEDURE — 84439 ASSAY OF FREE THYROXINE: CPT | Performed by: FAMILY MEDICINE

## 2022-03-31 PROCEDURE — 1160F PR REVIEW ALL MEDS BY PRESCRIBER/CLIN PHARMACIST DOCUMENTED: ICD-10-PCS | Mod: CPTII,S$GLB,, | Performed by: FAMILY MEDICINE

## 2022-03-31 PROCEDURE — 80053 COMPREHEN METABOLIC PANEL: CPT | Performed by: FAMILY MEDICINE

## 2022-03-31 PROCEDURE — 3008F BODY MASS INDEX DOCD: CPT | Mod: CPTII,S$GLB,, | Performed by: FAMILY MEDICINE

## 2022-03-31 PROCEDURE — 80061 LIPID PANEL: CPT | Performed by: FAMILY MEDICINE

## 2022-03-31 PROCEDURE — 3008F PR BODY MASS INDEX (BMI) DOCUMENTED: ICD-10-PCS | Mod: CPTII,S$GLB,, | Performed by: FAMILY MEDICINE

## 2022-03-31 PROCEDURE — 99396 PREV VISIT EST AGE 40-64: CPT | Mod: 25,S$GLB,, | Performed by: FAMILY MEDICINE

## 2022-03-31 PROCEDURE — 86803 HEPATITIS C AB TEST: CPT | Performed by: FAMILY MEDICINE

## 2022-03-31 PROCEDURE — 3075F SYST BP GE 130 - 139MM HG: CPT | Mod: CPTII,S$GLB,, | Performed by: FAMILY MEDICINE

## 2022-03-31 PROCEDURE — 3075F PR MOST RECENT SYSTOLIC BLOOD PRESS GE 130-139MM HG: ICD-10-PCS | Mod: CPTII,S$GLB,, | Performed by: FAMILY MEDICINE

## 2022-03-31 PROCEDURE — 3079F PR MOST RECENT DIASTOLIC BLOOD PRESSURE 80-89 MM HG: ICD-10-PCS | Mod: CPTII,S$GLB,, | Performed by: FAMILY MEDICINE

## 2022-03-31 PROCEDURE — 83036 HEMOGLOBIN GLYCOSYLATED A1C: CPT | Performed by: FAMILY MEDICINE

## 2022-03-31 PROCEDURE — 1159F MED LIST DOCD IN RCRD: CPT | Mod: CPTII,S$GLB,, | Performed by: FAMILY MEDICINE

## 2022-03-31 PROCEDURE — 99999 PR PBB SHADOW E&M-EST. PATIENT-LVL IV: CPT | Mod: PBBFAC,,, | Performed by: FAMILY MEDICINE

## 2022-03-31 PROCEDURE — 3072F PR LOW RISK FOR RETINOPATHY: ICD-10-PCS | Mod: CPTII,S$GLB,, | Performed by: FAMILY MEDICINE

## 2022-03-31 PROCEDURE — 99396 PR PREVENTIVE VISIT,EST,40-64: ICD-10-PCS | Mod: 25,S$GLB,, | Performed by: FAMILY MEDICINE

## 2022-03-31 PROCEDURE — 90471 FLU VACCINE (QUAD) GREATER THAN OR EQUAL TO 3YO PRESERVATIVE FREE IM: ICD-10-PCS | Mod: S$GLB,,, | Performed by: FAMILY MEDICINE

## 2022-03-31 PROCEDURE — 90686 IIV4 VACC NO PRSV 0.5 ML IM: CPT | Mod: S$GLB,,, | Performed by: FAMILY MEDICINE

## 2022-03-31 PROCEDURE — 84443 ASSAY THYROID STIM HORMONE: CPT | Performed by: FAMILY MEDICINE

## 2022-03-31 PROCEDURE — 1160F RVW MEDS BY RX/DR IN RCRD: CPT | Mod: CPTII,S$GLB,, | Performed by: FAMILY MEDICINE

## 2022-03-31 PROCEDURE — 3079F DIAST BP 80-89 MM HG: CPT | Mod: CPTII,S$GLB,, | Performed by: FAMILY MEDICINE

## 2022-03-31 PROCEDURE — 99999 PR PBB SHADOW E&M-EST. PATIENT-LVL IV: ICD-10-PCS | Mod: PBBFAC,,, | Performed by: FAMILY MEDICINE

## 2022-03-31 PROCEDURE — 90471 IMMUNIZATION ADMIN: CPT | Mod: S$GLB,,, | Performed by: FAMILY MEDICINE

## 2022-03-31 PROCEDURE — 1159F PR MEDICATION LIST DOCUMENTED IN MEDICAL RECORD: ICD-10-PCS | Mod: CPTII,S$GLB,, | Performed by: FAMILY MEDICINE

## 2022-03-31 PROCEDURE — 3072F LOW RISK FOR RETINOPATHY: CPT | Mod: CPTII,S$GLB,, | Performed by: FAMILY MEDICINE

## 2022-03-31 RX ORDER — ZOLPIDEM TARTRATE 5 MG/1
5 TABLET ORAL NIGHTLY PRN
Qty: 30 TABLET | Refills: 2 | Status: SHIPPED | OUTPATIENT
Start: 2022-03-31 | End: 2024-01-17 | Stop reason: SDUPTHER

## 2022-03-31 RX ORDER — PRAVASTATIN SODIUM 20 MG/1
20 TABLET ORAL DAILY
Qty: 90 TABLET | Refills: 3 | Status: SHIPPED | OUTPATIENT
Start: 2022-03-31 | End: 2023-05-31 | Stop reason: SDUPTHER

## 2022-03-31 RX ORDER — VERAPAMIL HYDROCHLORIDE 240 MG/1
TABLET, FILM COATED, EXTENDED RELEASE ORAL
Qty: 90 TABLET | Refills: 3 | Status: SHIPPED | OUTPATIENT
Start: 2022-03-31 | End: 2023-05-31 | Stop reason: SDUPTHER

## 2022-03-31 RX ORDER — TRIAMTERENE AND HYDROCHLOROTHIAZIDE 37.5; 25 MG/1; MG/1
1 CAPSULE ORAL DAILY
Qty: 90 CAPSULE | Refills: 3 | Status: SHIPPED | OUTPATIENT
Start: 2022-03-31 | End: 2023-05-31 | Stop reason: SDUPTHER

## 2022-03-31 NOTE — PROGRESS NOTES
Subjective:      Patient ID: Matt Lomax is a 56 y.o. female.    Chief Complaint: Annual Exam      HPI:  Matt Lomax is a 56 year old female with diabetes mellitus type 2, hyperlipidemia, hypertension, insomnia, obesity, JUAN, pituitary adenoma, prediabetes, secondary adrenal insufficiency, and secondary hypothyroidism who presents to clinic today for annual physical exam.    Reports she was diagnosed with shingles via outside urgent care.  Treated with anti-viral and ibuprofen.  Pain improved.      Adrenal insufficiency:  Takes hydrocortisone 10 mg by mouth with breakfast and half a tablet with dinner.  Followed by endocrinology     Hx of DM 2:  Last A1c 5.5% 11/3/20.  Previously on metformin but this was discontinued in 2017 by PCP.      HLD:  Prescribed pravastatin 20 mg by mouth daily.  Triglycerides 161 and HDL 32 on most recent lipid panel 8/3/20.    HTN:  Prescribed triamterene-hydrochlorothiazide 37.5-25 mg by mouth daily and verapamil 240 mg by mouth daily.    Hypothyroidism:  Prescribed levothyroxine 75 mcg by mouth daily.  Most recent FT4 3/2/21 normal.      Hyperprolactinemia; pituitary adenoma:  Diagnosed with macroadenoma, possible prolactinoma, ~ in 2006 and started on cabergoline at that time due to elevated prolactin (highest value I can find is 67).  Monitored with MRI with increase in size over time so she was referred to  and she had surgery in 7/2014.  Prolactin has been normal since time of surgery off cabergoline.  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  Followed by endocrinology; last saw them 2/17/21.  Most recent MRI 3/2/21 showed:  No significant change from prior continued heterogeneous signal and expansion posterior sella wall with thin marginated enhancement.  While nonspecific remains concerning for residual lesion which may represent intraosseous meningioma with pituitary adenoma to be included in differential.  No evidence for new signal abnormality or  increased sized lesion.  Continued but stable slight mass effect on the suprasellar neurovascular structures     Insomnia:  Prescribed Ambien 5 mg by mouth nightly as needed for sleep.       Obesity:  BMI 42.88.  Up 10 lbs since last seen by me 10/21/20.  Walking for exercise.      JUAN:  On CPAP.  Machine recalled, got filters, needs f/u with sleep clinic.    Requests refills on verapamil, pravastatin, Ambien, and triamterene-HCTZ.     Health Care Maintenance:  Influenza vaccination:  Amenable  Last tetanus booster:  16  Shingles vaccination:  Amenable to do at a later date  Pneumovax:  16  COVID-19 vaccination:  Moderna 3/31/21, 21, 21  Last pap smear:  20; normal  Last mammogram:  21  Last colonoscopy:  17, one 3 mm polyp removed; repeat 5 years  Hepatitis C screening:  Amenable      Past Medical History:   Diagnosis Date    HTN (hypertension)     Hyperprolactinemia     Morbid obesity     JUAN (obstructive sleep apnea)     Pituitary adenoma     Secondary hypothyroidism 2015    Type II or unspecified type diabetes mellitus without mention of complication, uncontrolled     Vitamin D deficiency disease        Past Surgical History:   Procedure Laterality Date    Breast Reduction       SECTION      x 3    COLONOSCOPY N/A 2017    Procedure: COLONOSCOPY;  Surgeon: Ciro Rebolledo MD;  Location: [facility] ([address] [place] [address]);  Service: Endoscopy;  Laterality: N/A;    TOTAL REDUCTION MAMMOPLASTY Bilateral     TRANSPHENOIDAL PITUITARY RESECTION  14    TUBAL LIGATION         Family History   Problem Relation Age of Onset    Diabetes Mother     Hypertension Mother     Heart disease Mother     Hypertension Father     Diabetes Maternal Aunt     No Known Problems Brother     No Known Problems Daughter     No Known Problems Son     No Known Problems Brother     No Known Problems Daughter     No Known Problems Sister     No Known Problems Maternal Uncle      No Known Problems Paternal Aunt     No Known Problems Paternal Uncle     No Known Problems Maternal Grandmother     No Known Problems Maternal Grandfather     No Known Problems Paternal Grandmother     No Known Problems Paternal Grandfather     Breast cancer Neg Hx     Colon cancer Neg Hx     Ovarian cancer Neg Hx     Amblyopia Neg Hx     Blindness Neg Hx     Cancer Neg Hx     Cataracts Neg Hx     Glaucoma Neg Hx     Macular degeneration Neg Hx     Retinal detachment Neg Hx     Strabismus Neg Hx     Stroke Neg Hx     Thyroid disease Neg Hx        Social History     Socioeconomic History    Marital status:    Occupational History    Occupation:      Employer: [employer]   Tobacco Use    Smoking status: Never Smoker    Smokeless tobacco: Never Used   Substance and Sexual Activity    Alcohol use: No    Drug use: No    Sexual activity: Not Currently     Partners: Male     Birth control/protection: Surgical, Post-menopausal       Review of Systems   Constitutional: Negative for chills, fatigue and fever.   HENT: Negative for congestion, hearing loss, nosebleeds, rhinorrhea, sore throat and trouble swallowing.    Eyes: Negative for pain and visual disturbance.   Respiratory: Negative for cough, shortness of breath and wheezing.    Cardiovascular: Negative for chest pain and palpitations.   Gastrointestinal: Negative for abdominal distention, abdominal pain, constipation, diarrhea, nausea and vomiting.   Genitourinary: Negative for decreased urine volume, difficulty urinating, dysuria, hematuria and urgency.   Musculoskeletal: Negative for arthralgias, back pain and myalgias.   Skin: Negative for color change and rash.   Neurological: Negative for dizziness, tremors, weakness, light-headedness, numbness and headaches.   Psychiatric/Behavioral: Negative for agitation, behavioral problems and confusion. The patient is not nervous/anxious.      Objective:      Vitals:    03/31/22 1020   BP: 130/82   BP Location: Left arm   Patient Position: Sitting   BP Method: Large (Manual)   Pulse: 61   Temp: 98.7 °F (37.1 °C)   TempSrc: Oral   SpO2: 98%   Weight: 99.6 kg (219 lb 9.3 oz)   Height: 5' (1.524 m)       Physical Exam  Vitals and nursing note reviewed.   Constitutional:       Appearance: She is well-developed.   HENT:      Head: Normocephalic and atraumatic.      Right Ear: External ear normal.      Left Ear: External ear normal.      Nose: Nose normal.   Eyes:      Conjunctiva/sclera: Conjunctivae normal.   Neck:      Trachea: No tracheal deviation.   Cardiovascular:      Rate and Rhythm: Normal rate and regular rhythm.      Heart sounds:     No friction rub. No gallop.   Pulmonary:      Effort: Pulmonary effort is normal. No respiratory distress.      Breath sounds: Normal breath sounds. No wheezing or rales.   Abdominal:      General: Bowel sounds are normal. There is no distension.      Palpations: Abdomen is soft.      Tenderness: There is no abdominal tenderness. There is no guarding or rebound.   Musculoskeletal:      Cervical back: Neck supple.   Skin:     General: Skin is warm and dry.          Neurological:      Mental Status: She is alert.   Psychiatric:         Behavior: Behavior normal.        Assessment:      1. Annual physical exam    2. History of diabetes mellitus, type II    3. Hyperlipidemia, unspecified hyperlipidemia type    4. Essential hypertension    5. Insomnia, unspecified type    6. Need for hepatitis C screening test    7. Obesity, morbid, BMI 40.0-49.9    8. JUAN on CPAP    9. Pituitary adenoma    10. Secondary adrenal insufficiency    11. Secondary hypothyroidism      Plan:   Matt was seen today for annual exam.    Diagnoses and all orders for this visit:    Annual physical exam  -     Comprehensive Metabolic Panel; Future  -     CBC Auto Differential; Future  -     TSH; Future  -     Lipid Panel; Future  -     T4, Free; Future  -      Hemoglobin A1C; Future  -     Fluzone to be administered today; vaccination card for Shingrix provided to do at a later date    History of diabetes mellitus, type II  -     Hemoglobin A1C; Future    Hyperlipidemia, unspecified hyperlipidemia type  -     Refilled pravastatin (PRAVACHOL) 20 MG tablet; Take 1 tablet (20 mg total) by mouth once daily. For Cholesterol.  -     Lipid Panel; Future    Essential hypertension  -     Stable, refilled triamterene-hydrochlorothiazide 37.5-25 mg (DYAZIDE) 37.5-25 mg per capsule; Take 1 capsule by mouth once daily.  -     Refilled verapamiL (CALAN-SR) 240 MG CR tablet; TAKE 1 TABLET (240 MG TOTAL) BY MOUTH ONCE DAILY.    Insomnia, unspecified type  -     Refilled zolpidem (AMBIEN) 5 MG Tab; Take 1 tablet (5 mg total) by mouth nightly as needed (Insomnia.).    Need for hepatitis C screening test  -     Hepatitis C Antibody; Future    Obesity, morbid, BMI 40.0-49.9  -     Hemoglobin A1C; Future    JUAN on CPAP        -     Recommended f/u with sleep clinic to discuss recall    Pituitary adenoma        -     Keep scheduled f/u with endocrinology    Secondary adrenal insufficiency        -     Continue current regimen at present & keep scheduled f/u with endocrinology    Secondary hypothyroidism  -     TSH; Future  -     T4, Free; Future

## 2022-04-01 LAB — HCV AB SERPL QL IA: NEGATIVE

## 2022-04-08 ENCOUNTER — PATIENT OUTREACH (OUTPATIENT)
Dept: ADMINISTRATIVE | Facility: OTHER | Age: 57
End: 2022-04-08
Payer: COMMERCIAL

## 2022-04-08 NOTE — PROGRESS NOTES
Health Maintenance Due   Topic Date Due    Shingles Vaccine (1 of 2) Never done     Updates were requested from care everywhere.  Chart was reviewed for overdue Proactive [facility] Encounters (SHANIKA) topics (CRS, Breast Cancer Screening, Eye exam)  Health Maintenance has been updated.  LINKS immunization registry triggered.  Immunizations were reconciled.

## 2022-04-11 ENCOUNTER — OFFICE VISIT (OUTPATIENT)
Dept: ENDOCRINOLOGY | Facility: CLINIC | Age: 57
End: 2022-04-11
Payer: COMMERCIAL

## 2022-04-11 VITALS
HEIGHT: 60 IN | WEIGHT: 222 LBS | DIASTOLIC BLOOD PRESSURE: 84 MMHG | HEART RATE: 64 BPM | SYSTOLIC BLOOD PRESSURE: 130 MMHG | BODY MASS INDEX: 43.59 KG/M2 | OXYGEN SATURATION: 98 %

## 2022-04-11 DIAGNOSIS — E27.49 SECONDARY ADRENAL INSUFFICIENCY: ICD-10-CM

## 2022-04-11 DIAGNOSIS — E03.8 SECONDARY HYPOTHYROIDISM: ICD-10-CM

## 2022-04-11 DIAGNOSIS — R73.02 IGT (IMPAIRED GLUCOSE TOLERANCE): ICD-10-CM

## 2022-04-11 DIAGNOSIS — E22.1 HYPERPROLACTINEMIA: ICD-10-CM

## 2022-04-11 DIAGNOSIS — Z86.39 H/O SECONDARY HYPOGONADISM: ICD-10-CM

## 2022-04-11 DIAGNOSIS — E66.01 OBESITY, MORBID, BMI 40.0-49.9: ICD-10-CM

## 2022-04-11 DIAGNOSIS — E03.9 HYPOTHYROIDISM, UNSPECIFIED TYPE: ICD-10-CM

## 2022-04-11 DIAGNOSIS — R73.01 IFG (IMPAIRED FASTING GLUCOSE): ICD-10-CM

## 2022-04-11 DIAGNOSIS — D35.2 PITUITARY ADENOMA: Primary | ICD-10-CM

## 2022-04-11 PROCEDURE — 3044F PR MOST RECENT HEMOGLOBIN A1C LEVEL <7.0%: ICD-10-PCS | Mod: CPTII,S$GLB,, | Performed by: INTERNAL MEDICINE

## 2022-04-11 PROCEDURE — 1160F PR REVIEW ALL MEDS BY PRESCRIBER/CLIN PHARMACIST DOCUMENTED: ICD-10-PCS | Mod: CPTII,S$GLB,, | Performed by: INTERNAL MEDICINE

## 2022-04-11 PROCEDURE — 1159F MED LIST DOCD IN RCRD: CPT | Mod: CPTII,S$GLB,, | Performed by: INTERNAL MEDICINE

## 2022-04-11 PROCEDURE — 3008F BODY MASS INDEX DOCD: CPT | Mod: CPTII,S$GLB,, | Performed by: INTERNAL MEDICINE

## 2022-04-11 PROCEDURE — 1160F RVW MEDS BY RX/DR IN RCRD: CPT | Mod: CPTII,S$GLB,, | Performed by: INTERNAL MEDICINE

## 2022-04-11 PROCEDURE — 99214 PR OFFICE/OUTPT VISIT, EST, LEVL IV, 30-39 MIN: ICD-10-PCS | Mod: S$GLB,,, | Performed by: INTERNAL MEDICINE

## 2022-04-11 PROCEDURE — 3072F LOW RISK FOR RETINOPATHY: CPT | Mod: CPTII,S$GLB,, | Performed by: INTERNAL MEDICINE

## 2022-04-11 PROCEDURE — 3075F PR MOST RECENT SYSTOLIC BLOOD PRESS GE 130-139MM HG: ICD-10-PCS | Mod: CPTII,S$GLB,, | Performed by: INTERNAL MEDICINE

## 2022-04-11 PROCEDURE — 99999 PR PBB SHADOW E&M-EST. PATIENT-LVL IV: ICD-10-PCS | Mod: PBBFAC,,, | Performed by: INTERNAL MEDICINE

## 2022-04-11 PROCEDURE — 99999 PR PBB SHADOW E&M-EST. PATIENT-LVL IV: CPT | Mod: PBBFAC,,, | Performed by: INTERNAL MEDICINE

## 2022-04-11 PROCEDURE — 3008F PR BODY MASS INDEX (BMI) DOCUMENTED: ICD-10-PCS | Mod: CPTII,S$GLB,, | Performed by: INTERNAL MEDICINE

## 2022-04-11 PROCEDURE — 1159F PR MEDICATION LIST DOCUMENTED IN MEDICAL RECORD: ICD-10-PCS | Mod: CPTII,S$GLB,, | Performed by: INTERNAL MEDICINE

## 2022-04-11 PROCEDURE — 3044F HG A1C LEVEL LT 7.0%: CPT | Mod: CPTII,S$GLB,, | Performed by: INTERNAL MEDICINE

## 2022-04-11 PROCEDURE — 99214 OFFICE O/P EST MOD 30 MIN: CPT | Mod: S$GLB,,, | Performed by: INTERNAL MEDICINE

## 2022-04-11 PROCEDURE — 3079F DIAST BP 80-89 MM HG: CPT | Mod: CPTII,S$GLB,, | Performed by: INTERNAL MEDICINE

## 2022-04-11 PROCEDURE — 3072F PR LOW RISK FOR RETINOPATHY: ICD-10-PCS | Mod: CPTII,S$GLB,, | Performed by: INTERNAL MEDICINE

## 2022-04-11 PROCEDURE — 3079F PR MOST RECENT DIASTOLIC BLOOD PRESSURE 80-89 MM HG: ICD-10-PCS | Mod: CPTII,S$GLB,, | Performed by: INTERNAL MEDICINE

## 2022-04-11 PROCEDURE — 3075F SYST BP GE 130 - 139MM HG: CPT | Mod: CPTII,S$GLB,, | Performed by: INTERNAL MEDICINE

## 2022-04-11 RX ORDER — LEVOTHYROXINE SODIUM 75 UG/1
75 TABLET ORAL
Qty: 90 TABLET | Refills: 3 | Status: SHIPPED | OUTPATIENT
Start: 2022-04-11 | End: 2023-04-10

## 2022-04-11 RX ORDER — HYDROCORTISONE 10 MG/1
TABLET ORAL
Qty: 45 TABLET | Refills: 11 | Status: SHIPPED | OUTPATIENT
Start: 2022-04-11 | End: 2022-05-09

## 2022-04-11 NOTE — PROGRESS NOTES
Matt Lomax is a 56 y.o. female with HTN, HLD, JUAN presenting for follow-up of pituitary macroadenoma s/p resection, secondary AI, hypothyroidism, elevated prolactin      History of Present Illness  Diagnosed with macroadenoma, possible prolactinoma, ~ in 2006 and started on cabergoline at that time due to elevated prolactin (highest value I can find is 67).  Monitored with MRI with increase in size over time so she was referred to  and she had surgery in 7/2014.       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     Secondary adrenal insufficiency   HC 10-0-5-0  She denies any dizziness, nausea, vomiting, lightheadedness.     Has gained weight over last several months and feels she has been trouble getting back on track. A1c higher than it was before     Secondary hypothyroidism  On levothyroxine 75 mcg daily. Takes on empty stomach separate from other meds.  Free T4 at goal 3/2022    Weight as above  Regular bm's. + dry skin. No excessive hair loss. No tremors or palpitations.   Some cold intolerance at night     No cycles since day of surgery  Occasional hot flashes  DXA 5/2020 with normal BMD     Optometry visit 11/2018 - no retinopathy  Denies headache or vision change  Seen by [facility] 3/2021. To go back in 2023.    Last MRI 3/2021:   MR sella: No significant change from prior continued heterogeneous signal and expansion posterior sella wall with thin marginated enhancement.  While nonspecific remains concerning for residual lesion which may represent intraosseous meningioma with pituitary adenoma to be included in differential.  No evidence for new signal abnormality or increased sized lesion.    Denies headache, vision change     HbA1c improved and metformin d/c by PCP in summer 2017. Recent rise in A1c and again in preDM range     Has hypertension on dyazide and verapamil with stable  blood pressure.      Vit d insufficiency -D3 1000 units daily         Current Outpatient Medications:     ibuprofen (ADVIL,MOTRIN) 800 MG tablet, Take 1 tablet (800 mg total) by mouth every 8 (eight) hours as needed for Pain., Disp: 30 tablet, Rfl: 2    multivitamin (THERAGRAN) per tablet, Take 1 tablet by mouth every morning. , Disp: , Rfl:     pravastatin (PRAVACHOL) 20 MG tablet, Take 1 tablet (20 mg total) by mouth once daily. For Cholesterol., Disp: 90 tablet, Rfl: 3    triamterene-hydrochlorothiazide 37.5-25 mg (DYAZIDE) 37.5-25 mg per capsule, Take 1 capsule by mouth once daily., Disp: 90 capsule, Rfl: 3    verapamiL (CALAN-SR) 240 MG CR tablet, TAKE 1 TABLET (240 MG TOTAL) BY MOUTH ONCE DAILY., Disp: 90 tablet, Rfl: 3    zolpidem (AMBIEN) 5 MG Tab, Take 1 tablet (5 mg total) by mouth nightly as needed (Insomnia.)., Disp: 30 tablet, Rfl: 2    hydrocortisone (CORTEF) 10 MG Tab, TAKE 1 TABLET (10MG) BY MOUTH WITH BREAKFAST AND 1/2 TABLET (5 MG) WITH DINNER, Disp: 45 tablet, Rfl: 11    levothyroxine (SYNTHROID) 75 MCG tablet, Take 1 tablet (75 mcg total) by mouth before breakfast., Disp: 90 tablet, Rfl: 3    ROS as above    Objective:     Vitals:    04/11/22 1029   BP: 130/84   Pulse: 64     Wt Readings from Last 3 Encounters:   04/11/22 100.7 kg (222 lb 0.1 oz)   03/31/22 99.6 kg (219 lb 9.3 oz)   07/29/21 101.1 kg (222 lb 14.2 oz)     Body mass index is 43.36 kg/m².    LABS    Chemistry        Component Value Date/Time     03/31/2022 1121    K 3.9 03/31/2022 1121     03/31/2022 1121    CO2 29 03/31/2022 1121    BUN 11 03/31/2022 1121    CREATININE 0.8 03/31/2022 1121    GLU 87 03/31/2022 1121        Component Value Date/Time    CALCIUM 10.1 03/31/2022 1121    ALKPHOS 94 03/31/2022 1121    AST 23 03/31/2022 1121    ALT 11 03/31/2022 1121    BILITOT 0.4 03/31/2022 1121    ESTGFRAFRICA >60.0 03/31/2022 1121    EGFRNONAA >60.0 03/31/2022 1121        Lab Results   Component Value Date    HGBA1C  5.9 (H) 03/31/2022     Vit D, 25-Hydroxy   Date Value Ref Range Status   08/03/2020 39 30 - 96 ng/mL Final     Comment:     Vitamin D deficiency.........<10 ng/mL                              Vitamin D insufficiency......10-29 ng/mL       Vitamin D sufficiency........> or equal to 30 ng/mL  Vitamin D toxicity............>100 ng/mL       Component      Latest Ref Rng & Units 3/2/2021   Prolactin      5.2 - 26.5 ng/mL 17.8   Free T4      0.71 - 1.51 ng/dL 0.96          Assessment and Plan     Pituitary adenoma  MRI stable 2021  Will see Dr. Willis again with imaging in 2023    Secondary adrenal insufficiency  On adequate replacement dose.  Cont HC 10/5 mg  aware of sick day rules        Secondary hypothyroidism  FT4 at goal  Cont levothyroxine 75 mcg daily    H/O secondary hypogonadism  DXA normal 2020  Repeat age 66 or sooner if additional risk factors or clinical change    Obesity, morbid, BMI 40.0-49.9  Motivated to lose weight and has been successful in past  Consider metformin, GLP in future    IGT (impaired glucose tolerance)  A1c again in preDM range. Discussed restart metformin but she would like trial of diet/exercise first  Will repeat 6 months and if not improved resume metformin. She is agreeable to this  GLP would be option in future as well        RTC 1 year    Trudy Qiu MD

## 2022-04-11 NOTE — ASSESSMENT & PLAN NOTE
A1c again in preDM range. Discussed restart metformin but she would like trial of diet/exercise first  Will repeat 6 months and if not improved resume metformin. She is agreeable to this  GLP would be option in future as well

## 2022-05-11 ENCOUNTER — PATIENT MESSAGE (OUTPATIENT)
Dept: SLEEP MEDICINE | Facility: CLINIC | Age: 57
End: 2022-05-11
Payer: COMMERCIAL

## 2022-06-30 ENCOUNTER — OCCUPATIONAL HEALTH (OUTPATIENT)
Dept: URGENT CARE | Facility: CLINIC | Age: 57
End: 2022-06-30

## 2022-06-30 DIAGNOSIS — Z02.89 ENCOUNTER FOR EXAMINATION REQUIRED BY DEPARTMENT OF TRANSPORTATION (DOT): Primary | ICD-10-CM

## 2022-06-30 PROCEDURE — 99499 UNLISTED E&M SERVICE: CPT | Mod: S$GLB,,, | Performed by: NURSE PRACTITIONER

## 2022-06-30 PROCEDURE — 99499 PHYSICAL, RECERT DOT/CDL: ICD-10-PCS | Mod: S$GLB,,, | Performed by: NURSE PRACTITIONER

## 2022-10-11 ENCOUNTER — LAB VISIT (OUTPATIENT)
Dept: LAB | Facility: HOSPITAL | Age: 57
End: 2022-10-11
Attending: INTERNAL MEDICINE
Payer: COMMERCIAL

## 2022-10-11 DIAGNOSIS — E03.9 HYPOTHYROIDISM, UNSPECIFIED TYPE: ICD-10-CM

## 2022-10-11 DIAGNOSIS — E22.1 HYPERPROLACTINEMIA: ICD-10-CM

## 2022-10-11 DIAGNOSIS — R73.01 IFG (IMPAIRED FASTING GLUCOSE): ICD-10-CM

## 2022-10-11 LAB
ESTIMATED AVG GLUCOSE: 117 MG/DL (ref 68–131)
HBA1C MFR BLD: 5.7 % (ref 4–5.6)
PROLACTIN SERPL IA-MCNC: 19.7 NG/ML (ref 5.2–26.5)
T4 FREE SERPL-MCNC: 0.93 NG/DL (ref 0.71–1.51)

## 2022-10-11 PROCEDURE — 83036 HEMOGLOBIN GLYCOSYLATED A1C: CPT | Performed by: INTERNAL MEDICINE

## 2022-10-11 PROCEDURE — 84439 ASSAY OF FREE THYROXINE: CPT | Performed by: INTERNAL MEDICINE

## 2022-10-11 PROCEDURE — 36415 COLL VENOUS BLD VENIPUNCTURE: CPT | Performed by: INTERNAL MEDICINE

## 2022-10-11 PROCEDURE — 84146 ASSAY OF PROLACTIN: CPT | Performed by: INTERNAL MEDICINE

## 2022-10-18 ENCOUNTER — CLINICAL SUPPORT (OUTPATIENT)
Dept: OTHER | Facility: CLINIC | Age: 57
End: 2022-10-18
Payer: COMMERCIAL

## 2022-10-18 DIAGNOSIS — Z00.8 ENCOUNTER FOR OTHER GENERAL EXAMINATION: ICD-10-CM

## 2022-10-24 LAB
GLUCOSE SERPL-MCNC: 111 MG/DL (ref 60–140)
HDLC SERPL-MCNC: 29 MG/DL
POC CHOLESTEROL, LDL (DOCK): 83 MG/DL
POC CHOLESTEROL, TOTAL: 149 MG/DL
TRIGL SERPL-MCNC: 216 MG/DL

## 2022-10-29 VITALS
BODY MASS INDEX: 41.62 KG/M2 | WEIGHT: 212 LBS | HEIGHT: 60 IN | SYSTOLIC BLOOD PRESSURE: 142 MMHG | DIASTOLIC BLOOD PRESSURE: 76 MMHG

## 2022-11-28 ENCOUNTER — OFFICE VISIT (OUTPATIENT)
Dept: OBSTETRICS AND GYNECOLOGY | Facility: CLINIC | Age: 57
End: 2022-11-28
Payer: COMMERCIAL

## 2022-11-28 VITALS
SYSTOLIC BLOOD PRESSURE: 132 MMHG | WEIGHT: 218.69 LBS | DIASTOLIC BLOOD PRESSURE: 90 MMHG | BODY MASS INDEX: 42.71 KG/M2

## 2022-11-28 DIAGNOSIS — Z12.4 SCREENING FOR CERVICAL CANCER: ICD-10-CM

## 2022-11-28 DIAGNOSIS — Z01.419 WELL WOMAN EXAM WITH ROUTINE GYNECOLOGICAL EXAM: Primary | ICD-10-CM

## 2022-11-28 PROCEDURE — 99999 PR PBB SHADOW E&M-EST. PATIENT-LVL III: CPT | Mod: PBBFAC,,, | Performed by: OBSTETRICS & GYNECOLOGY

## 2022-11-28 PROCEDURE — 1160F PR REVIEW ALL MEDS BY PRESCRIBER/CLIN PHARMACIST DOCUMENTED: ICD-10-PCS | Mod: CPTII,S$GLB,, | Performed by: OBSTETRICS & GYNECOLOGY

## 2022-11-28 PROCEDURE — 3075F PR MOST RECENT SYSTOLIC BLOOD PRESS GE 130-139MM HG: ICD-10-PCS | Mod: CPTII,S$GLB,, | Performed by: OBSTETRICS & GYNECOLOGY

## 2022-11-28 PROCEDURE — 3080F DIAST BP >= 90 MM HG: CPT | Mod: CPTII,S$GLB,, | Performed by: OBSTETRICS & GYNECOLOGY

## 2022-11-28 PROCEDURE — 3008F BODY MASS INDEX DOCD: CPT | Mod: CPTII,S$GLB,, | Performed by: OBSTETRICS & GYNECOLOGY

## 2022-11-28 PROCEDURE — 99396 PREV VISIT EST AGE 40-64: CPT | Mod: S$GLB,,, | Performed by: OBSTETRICS & GYNECOLOGY

## 2022-11-28 PROCEDURE — 88175 CYTOPATH C/V AUTO FLUID REDO: CPT | Performed by: OBSTETRICS & GYNECOLOGY

## 2022-11-28 PROCEDURE — 99999 PR PBB SHADOW E&M-EST. PATIENT-LVL III: ICD-10-PCS | Mod: PBBFAC,,, | Performed by: OBSTETRICS & GYNECOLOGY

## 2022-11-28 PROCEDURE — 99396 PR PREVENTIVE VISIT,EST,40-64: ICD-10-PCS | Mod: S$GLB,,, | Performed by: OBSTETRICS & GYNECOLOGY

## 2022-11-28 PROCEDURE — 3044F HG A1C LEVEL LT 7.0%: CPT | Mod: CPTII,S$GLB,, | Performed by: OBSTETRICS & GYNECOLOGY

## 2022-11-28 PROCEDURE — 3008F PR BODY MASS INDEX (BMI) DOCUMENTED: ICD-10-PCS | Mod: CPTII,S$GLB,, | Performed by: OBSTETRICS & GYNECOLOGY

## 2022-11-28 PROCEDURE — 1159F PR MEDICATION LIST DOCUMENTED IN MEDICAL RECORD: ICD-10-PCS | Mod: CPTII,S$GLB,, | Performed by: OBSTETRICS & GYNECOLOGY

## 2022-11-28 PROCEDURE — 1159F MED LIST DOCD IN RCRD: CPT | Mod: CPTII,S$GLB,, | Performed by: OBSTETRICS & GYNECOLOGY

## 2022-11-28 PROCEDURE — 3075F SYST BP GE 130 - 139MM HG: CPT | Mod: CPTII,S$GLB,, | Performed by: OBSTETRICS & GYNECOLOGY

## 2022-11-28 PROCEDURE — 3080F PR MOST RECENT DIASTOLIC BLOOD PRESSURE >= 90 MM HG: ICD-10-PCS | Mod: CPTII,S$GLB,, | Performed by: OBSTETRICS & GYNECOLOGY

## 2022-11-28 PROCEDURE — 3044F PR MOST RECENT HEMOGLOBIN A1C LEVEL <7.0%: ICD-10-PCS | Mod: CPTII,S$GLB,, | Performed by: OBSTETRICS & GYNECOLOGY

## 2022-11-28 PROCEDURE — 1160F RVW MEDS BY RX/DR IN RCRD: CPT | Mod: CPTII,S$GLB,, | Performed by: OBSTETRICS & GYNECOLOGY

## 2022-11-28 RX ORDER — NITROFURANTOIN 25; 75 MG/1; MG/1
100 CAPSULE ORAL 2 TIMES DAILY
Qty: 14 CAPSULE | Refills: 0 | Status: SHIPPED | OUTPATIENT
Start: 2022-11-28 | End: 2022-12-05

## 2022-11-28 NOTE — PROGRESS NOTES
GYNECOLOGY OFFICE NOTE    Reason for visit: annual    HPI: Pt is a 56 y.o.  female  who presents for annual. Menarche: 15. None since  s/p pituitary resection. She is not  currently sexually active but considering it in the future.  She denies vaginal discharge.  Last pap: 2020, denies hx of abnormal. Last MMG 2021- negative. Reports urinary complaints consistent with UTI.     Past Medical History:   Diagnosis Date    HTN (hypertension)     Hyperprolactinemia     Morbid obesity     JUAN (obstructive sleep apnea)     Pituitary adenoma     Secondary hypothyroidism 2015    Type II or unspecified type diabetes mellitus without mention of complication, uncontrolled     Vitamin D deficiency disease        Past Surgical History:   Procedure Laterality Date    Breast Reduction       SECTION      x 3    COLONOSCOPY N/A 2017    Procedure: COLONOSCOPY;  Surgeon: Ciro Rebolledo MD;  Location: [facility] ([address] [place] [address]);  Service: Endoscopy;  Laterality: N/A;    TOTAL REDUCTION MAMMOPLASTY Bilateral     TRANSPHENOIDAL PITUITARY RESECTION  15    TUBAL LIGATION         Family History   Problem Relation Age of Onset    Diabetes Mother     Hypertension Mother     Heart disease Mother     Hypertension Father     Diabetes Maternal Aunt     No Known Problems Brother     No Known Problems Daughter     No Known Problems Son     No Known Problems Brother     No Known Problems Daughter     No Known Problems Sister     No Known Problems Maternal Uncle     No Known Problems Paternal Aunt     No Known Problems Paternal Uncle     No Known Problems Maternal Grandmother     No Known Problems Maternal Grandfather     No Known Problems Paternal Grandmother     No Known Problems Paternal Grandfather     Breast cancer Neg Hx     Colon cancer Neg Hx     Ovarian cancer Neg Hx     Amblyopia Neg Hx     Blindness Neg Hx     Cancer Neg Hx     Cataracts Neg Hx     Glaucoma Neg Hx     Macular degeneration Neg Hx      Retinal detachment Neg Hx     Strabismus Neg Hx     Stroke Neg Hx     Thyroid disease Neg Hx        Social History     Tobacco Use    Smoking status: Never    Smokeless tobacco: Never   Substance Use Topics    Alcohol use: No    Drug use: No       OB History    Para Term  AB Living   6 6 3     3   SAB IAB Ectopic Multiple Live Births           3      # Outcome Date GA Lbr Timbo/2nd Weight Sex Delivery Anes PTL Lv   6 Term            5 Term            4 Term            3 Para      CS-Unspec   FELICITY   2 Para      CS-Unspec   FELICITY   1 Para      CS-Unspec   FELICITY       Current Outpatient Medications   Medication Sig    hydrocortisone (CORTEF) 10 MG Tab TAKE 1 TABLET (10MG) BY MOUTH WITH BREAKFAST AND 1/2 TABLET (5 MG) WITH DINNER    ibuprofen (ADVIL,MOTRIN) 800 MG tablet Take 1 tablet (800 mg total) by mouth every 8 (eight) hours as needed for Pain.    levothyroxine (SYNTHROID) 75 MCG tablet Take 1 tablet (75 mcg total) by mouth before breakfast.    multivitamin (THERAGRAN) per tablet Take 1 tablet by mouth every morning.     pravastatin (PRAVACHOL) 20 MG tablet Take 1 tablet (20 mg total) by mouth once daily. For Cholesterol.    triamterene-hydrochlorothiazide 37.5-25 mg (DYAZIDE) 37.5-25 mg per capsule Take 1 capsule by mouth once daily.    verapamiL (CALAN-SR) 240 MG CR tablet TAKE 1 TABLET (240 MG TOTAL) BY MOUTH ONCE DAILY.    zolpidem (AMBIEN) 5 MG Tab Take 1 tablet (5 mg total) by mouth nightly as needed (Insomnia.).    nitrofurantoin, macrocrystal-monohydrate, (MACROBID) 100 MG capsule Take 1 capsule (100 mg total) by mouth 2 (two) times daily. for 7 days     No current facility-administered medications for this visit.       Allergies: Lisinopril     BP (!) 132/90   Wt 99.2 kg (218 lb 11.1 oz)   LMP 2014   BMI 42.71 kg/m²     ROS:  GENERAL: Denies fever or chills.   SKIN: Denies rash or lesions.   HEAD: Denies head injury or headache.   CHEST: Denies chest pain or shortness of breath.    CARDIOVASCULAR: Denies palpitations or chest pain.   ABDOMEN: No constipation, diarrhea, nausea, vomiting or rectal bleeding.   URINARY: No dysuria, hematuria, or burning on urination.  REPRODUCTIVE: See HPI.   BREASTS: see HPI  NEUROLOGIC: Denies syncope or weakness.     Physical Exam:  GENERAL: alert, appears stated age and cooperative  NEUROLOGIC: orientated to person, place and time, normal mood and affect   CHEST: Normal respiratory effort  NECK: normal appearance  SKIN: no acne, hirsutism  BREAST EXAM: breasts appear normal, no suspicious masses, no skin or nipple changes or axillary nodes  ABDOMEN: abdomen is soft without significant tenderness, masses  EXTERNAL GENITALIA:  normal general appearance  URETHRA: normal urethra, normal urethral meatus  VAGINA:  atrophic mucosa, no  lesions  CERVIX:  palpated normal  UTERUS:  nontender  ADNEXA: nontender    Diagnosis:  1. Well woman exam with routine gynecological exam    2. Screening for cervical cancer        Plan:   1. Annual- rx macrobid sent. Pt unable to void  2. Pap- discussed if atrophic smear-- recommend vaginal estrogen x 2-3 months prior to repeat    Orders Placed This Encounter    Liquid-Based Pap Smear, Screening    nitrofurantoin, macrocrystal-monohydrate, (MACROBID) 100 MG capsule         Gloria Pickard MD  OB/GYN

## 2022-12-05 LAB
FINAL PATHOLOGIC DIAGNOSIS: NORMAL
Lab: NORMAL

## 2023-01-25 ENCOUNTER — PATIENT MESSAGE (OUTPATIENT)
Dept: OBSTETRICS AND GYNECOLOGY | Facility: CLINIC | Age: 58
End: 2023-01-25
Payer: COMMERCIAL

## 2023-01-25 DIAGNOSIS — N95.2 VAGINAL ATROPHY: Primary | ICD-10-CM

## 2023-01-30 NOTE — TELEPHONE ENCOUNTER
Called patient. Had spotting with intercourse after having a slight tear. Rx vaginal estrogen sent. Given precautions. All questions answered.     Gloria Pickard MD, FACOG  OB/GYN

## 2023-05-30 DIAGNOSIS — E22.1 HYPERPROLACTINEMIA: ICD-10-CM

## 2023-05-30 RX ORDER — HYDROCORTISONE 10 MG/1
TABLET ORAL
Qty: 45 TABLET | Refills: 11 | Status: SHIPPED | OUTPATIENT
Start: 2023-05-30

## 2023-05-31 ENCOUNTER — LAB VISIT (OUTPATIENT)
Dept: LAB | Facility: HOSPITAL | Age: 58
End: 2023-05-31
Attending: INTERNAL MEDICINE
Payer: COMMERCIAL

## 2023-05-31 ENCOUNTER — OFFICE VISIT (OUTPATIENT)
Dept: ENDOCRINOLOGY | Facility: CLINIC | Age: 58
End: 2023-05-31
Payer: COMMERCIAL

## 2023-05-31 VITALS
SYSTOLIC BLOOD PRESSURE: 160 MMHG | OXYGEN SATURATION: 98 % | DIASTOLIC BLOOD PRESSURE: 110 MMHG | HEART RATE: 64 BPM | HEIGHT: 60 IN | WEIGHT: 217.69 LBS | BODY MASS INDEX: 42.74 KG/M2

## 2023-05-31 DIAGNOSIS — E27.49 SECONDARY ADRENAL INSUFFICIENCY: ICD-10-CM

## 2023-05-31 DIAGNOSIS — E78.5 HYPERLIPIDEMIA, UNSPECIFIED HYPERLIPIDEMIA TYPE: ICD-10-CM

## 2023-05-31 DIAGNOSIS — E55.9 VITAMIN D DEFICIENCY: ICD-10-CM

## 2023-05-31 DIAGNOSIS — R73.02 IGT (IMPAIRED GLUCOSE TOLERANCE): ICD-10-CM

## 2023-05-31 DIAGNOSIS — E03.8 SECONDARY HYPOTHYROIDISM: ICD-10-CM

## 2023-05-31 DIAGNOSIS — I10 ESSENTIAL HYPERTENSION: ICD-10-CM

## 2023-05-31 DIAGNOSIS — R73.03 PREDIABETES: ICD-10-CM

## 2023-05-31 DIAGNOSIS — E66.01 OBESITY, MORBID, BMI 40.0-49.9: ICD-10-CM

## 2023-05-31 DIAGNOSIS — D35.2 PITUITARY ADENOMA: Primary | ICD-10-CM

## 2023-05-31 LAB
25(OH)D3+25(OH)D2 SERPL-MCNC: 30 NG/ML (ref 30–96)
ALBUMIN SERPL BCP-MCNC: 4 G/DL (ref 3.5–5.2)
ALP SERPL-CCNC: 87 U/L (ref 55–135)
ALT SERPL W/O P-5'-P-CCNC: 13 U/L (ref 10–44)
ANION GAP SERPL CALC-SCNC: 9 MMOL/L (ref 8–16)
AST SERPL-CCNC: 22 U/L (ref 10–40)
BILIRUB SERPL-MCNC: 0.5 MG/DL (ref 0.1–1)
BUN SERPL-MCNC: 13 MG/DL (ref 6–20)
CALCIUM SERPL-MCNC: 9.8 MG/DL (ref 8.7–10.5)
CHLORIDE SERPL-SCNC: 102 MMOL/L (ref 95–110)
CO2 SERPL-SCNC: 29 MMOL/L (ref 23–29)
CREAT SERPL-MCNC: 1 MG/DL (ref 0.5–1.4)
EST. GFR  (NO RACE VARIABLE): >60 ML/MIN/1.73 M^2
ESTIMATED AVG GLUCOSE: 117 MG/DL (ref 68–131)
GLUCOSE SERPL-MCNC: 109 MG/DL (ref 70–110)
HBA1C MFR BLD: 5.7 % (ref 4–5.6)
POTASSIUM SERPL-SCNC: 3.6 MMOL/L (ref 3.5–5.1)
PROT SERPL-MCNC: 7.5 G/DL (ref 6–8.4)
SODIUM SERPL-SCNC: 140 MMOL/L (ref 136–145)
T4 FREE SERPL-MCNC: 1 NG/DL (ref 0.71–1.51)

## 2023-05-31 PROCEDURE — 1159F PR MEDICATION LIST DOCUMENTED IN MEDICAL RECORD: ICD-10-PCS | Mod: CPTII,S$GLB,, | Performed by: INTERNAL MEDICINE

## 2023-05-31 PROCEDURE — 3080F DIAST BP >= 90 MM HG: CPT | Mod: CPTII,S$GLB,, | Performed by: INTERNAL MEDICINE

## 2023-05-31 PROCEDURE — 3008F PR BODY MASS INDEX (BMI) DOCUMENTED: ICD-10-PCS | Mod: CPTII,S$GLB,, | Performed by: INTERNAL MEDICINE

## 2023-05-31 PROCEDURE — 84439 ASSAY OF FREE THYROXINE: CPT | Performed by: INTERNAL MEDICINE

## 2023-05-31 PROCEDURE — 3080F PR MOST RECENT DIASTOLIC BLOOD PRESSURE >= 90 MM HG: ICD-10-PCS | Mod: CPTII,S$GLB,, | Performed by: INTERNAL MEDICINE

## 2023-05-31 PROCEDURE — 99214 PR OFFICE/OUTPT VISIT, EST, LEVL IV, 30-39 MIN: ICD-10-PCS | Mod: S$GLB,,, | Performed by: INTERNAL MEDICINE

## 2023-05-31 PROCEDURE — 3077F SYST BP >= 140 MM HG: CPT | Mod: CPTII,S$GLB,, | Performed by: INTERNAL MEDICINE

## 2023-05-31 PROCEDURE — 1160F PR REVIEW ALL MEDS BY PRESCRIBER/CLIN PHARMACIST DOCUMENTED: ICD-10-PCS | Mod: CPTII,S$GLB,, | Performed by: INTERNAL MEDICINE

## 2023-05-31 PROCEDURE — 83036 HEMOGLOBIN GLYCOSYLATED A1C: CPT | Performed by: INTERNAL MEDICINE

## 2023-05-31 PROCEDURE — 99999 PR PBB SHADOW E&M-EST. PATIENT-LVL III: CPT | Mod: PBBFAC,,, | Performed by: INTERNAL MEDICINE

## 2023-05-31 PROCEDURE — 36415 COLL VENOUS BLD VENIPUNCTURE: CPT | Performed by: INTERNAL MEDICINE

## 2023-05-31 PROCEDURE — 80053 COMPREHEN METABOLIC PANEL: CPT | Performed by: INTERNAL MEDICINE

## 2023-05-31 PROCEDURE — 3008F BODY MASS INDEX DOCD: CPT | Mod: CPTII,S$GLB,, | Performed by: INTERNAL MEDICINE

## 2023-05-31 PROCEDURE — 99214 OFFICE O/P EST MOD 30 MIN: CPT | Mod: S$GLB,,, | Performed by: INTERNAL MEDICINE

## 2023-05-31 PROCEDURE — 1160F RVW MEDS BY RX/DR IN RCRD: CPT | Mod: CPTII,S$GLB,, | Performed by: INTERNAL MEDICINE

## 2023-05-31 PROCEDURE — 99999 PR PBB SHADOW E&M-EST. PATIENT-LVL III: ICD-10-PCS | Mod: PBBFAC,,, | Performed by: INTERNAL MEDICINE

## 2023-05-31 PROCEDURE — 3077F PR MOST RECENT SYSTOLIC BLOOD PRESSURE >= 140 MM HG: ICD-10-PCS | Mod: CPTII,S$GLB,, | Performed by: INTERNAL MEDICINE

## 2023-05-31 PROCEDURE — 82306 VITAMIN D 25 HYDROXY: CPT | Performed by: INTERNAL MEDICINE

## 2023-05-31 PROCEDURE — 1159F MED LIST DOCD IN RCRD: CPT | Mod: CPTII,S$GLB,, | Performed by: INTERNAL MEDICINE

## 2023-05-31 RX ORDER — PRAVASTATIN SODIUM 20 MG/1
20 TABLET ORAL DAILY
Qty: 90 TABLET | Refills: 3 | Status: SHIPPED | OUTPATIENT
Start: 2023-05-31

## 2023-05-31 RX ORDER — TRIAMTERENE AND HYDROCHLOROTHIAZIDE 37.5; 25 MG/1; MG/1
1 CAPSULE ORAL DAILY
Qty: 90 CAPSULE | Refills: 3 | Status: SHIPPED | OUTPATIENT
Start: 2023-05-31

## 2023-05-31 RX ORDER — VERAPAMIL HYDROCHLORIDE 240 MG/1
TABLET, FILM COATED, EXTENDED RELEASE ORAL
Qty: 90 TABLET | Refills: 3 | Status: SHIPPED | OUTPATIENT
Start: 2023-05-31

## 2023-05-31 NOTE — PROGRESS NOTES
Matt Lomax is a 57 y.o. female with HTN, HLD, JUAN presenting for follow-up of pituitary macroadenoma s/p resection, secondary AI, hypothyroidism, elevated prolactin      History of Present Illness  Diagnosed with macroadenoma, possible prolactinoma, ~ in 2006 and started on cabergoline at that time due to elevated prolactin (highest value I can find is 67).  Monitored with MRI with increase in size over time so she was referred to  and she had surgery in 7/2014.       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     Secondary adrenal insufficiency   HC 10-0-5-0  She denies any dizziness, nausea, vomiting, lightheadedness.   Some sluggishness around noon which is new     Secondary hypothyroidism  On levothyroxine 75 mcg daily. Takes on empty stomach separate from other meds.  Free T4 at goal 3/2022    Weight stable  Regular bm's. + dry skin. No excessive hair loss. No tremors or palpitations.      No cycles since day of surgery  Occasional hot flashes  DXA 5/2020 with normal BMD     Optometry visit 11/2018 - no retinopathy  Denies headache or vision change  Seen by [facility] 3/2021. To go back in 2023.    Last MRI 3/2021:   MR sella: No significant change from prior continued heterogeneous signal and expansion posterior sella wall with thin marginated enhancement.  While nonspecific remains concerning for residual lesion which may represent intraosseous meningioma with pituitary adenoma to be included in differential.  No evidence for new signal abnormality or increased sized lesion.    Denies headache, vision change     HbA1c improved and metformin d/c by PCP in summer 2017. Recent rise in A1c and again in preDM range  She is hesitant to restart medication  Lab Results   Component Value Date    HGBA1C 5.7 (H) 10/11/2022     Finds appetite low. Some snacks with chips and candy     Has  hypertension on dyazide and verapamil with stable blood pressure. out of medications currently     Vit d insufficiency -D3 1000 units daily     Vit D, 25-Hydroxy   Date Value Ref Range Status   08/03/2020 39 30 - 96 ng/mL Final     Comment:     Vitamin D deficiency.........<10 ng/mL                              Vitamin D insufficiency......10-29 ng/mL       Vitamin D sufficiency........> or equal to 30 ng/mL  Vitamin D toxicity............>100 ng/mL             Current Outpatient Medications:     conjugated estrogens (PREMARIN) vaginal cream, Place 1g vaginally nightly x 2 weeks, then twice weekly thereafter, Disp: 30 g, Rfl: 3    hydrocortisone (CORTEF) 10 MG Tab, TAKE 1 TABLET BY MOUTH ONCE DAILY WITH BREAKFAST AND  ONE-HALF  TABLET  ONCE  DAILY  WITH  DINNER, Disp: 45 tablet, Rfl: 11    ibuprofen (ADVIL,MOTRIN) 800 MG tablet, Take 1 tablet (800 mg total) by mouth every 8 (eight) hours as needed for Pain., Disp: 30 tablet, Rfl: 2    levothyroxine (SYNTHROID) 75 MCG tablet, TAKE 1 TABLET BY MOUTH BEFORE BREAKFAST, Disp: 90 tablet, Rfl: 3    multivitamin (THERAGRAN) per tablet, Take 1 tablet by mouth every morning. , Disp: , Rfl:     zolpidem (AMBIEN) 5 MG Tab, Take 1 tablet (5 mg total) by mouth nightly as needed (Insomnia.)., Disp: 30 tablet, Rfl: 2    pravastatin (PRAVACHOL) 20 MG tablet, Take 1 tablet (20 mg total) by mouth once daily. For Cholesterol., Disp: 90 tablet, Rfl: 3    triamterene-hydrochlorothiazide 37.5-25 mg (DYAZIDE) 37.5-25 mg per capsule, Take 1 capsule by mouth once daily., Disp: 90 capsule, Rfl: 3    verapamiL (CALAN-SR) 240 MG CR tablet, TAKE 1 TABLET (240 MG TOTAL) BY MOUTH ONCE DAILY., Disp: 90 tablet, Rfl: 3    ROS as above    Objective:     Vitals:    05/31/23 1020   BP: (!) 160/110   Pulse: 64     Wt Readings from Last 3 Encounters:   05/31/23 98.8 kg (217 lb 11.3 oz)   11/28/22 99.2 kg (218 lb 11.1 oz)   10/18/22 96.2 kg (212 lb)     Body mass index is 42.52 kg/m².    LABS    Chemistry         Component Value Date/Time     03/31/2022 1121    K 3.9 03/31/2022 1121     03/31/2022 1121    CO2 29 03/31/2022 1121    BUN 11 03/31/2022 1121    CREATININE 0.8 03/31/2022 1121    GLU 87 03/31/2022 1121        Component Value Date/Time    CALCIUM 10.1 03/31/2022 1121    ALKPHOS 94 03/31/2022 1121    AST 23 03/31/2022 1121    ALT 11 03/31/2022 1121    BILITOT 0.4 03/31/2022 1121    ESTGFRAFRICA >60.0 03/31/2022 1121    EGFRNONAA >60.0 03/31/2022 1121        Lab Results   Component Value Date    HGBA1C 5.7 (H) 10/11/2022     Vit D, 25-Hydroxy   Date Value Ref Range Status   08/03/2020 39 30 - 96 ng/mL Final     Comment:     Vitamin D deficiency.........<10 ng/mL                              Vitamin D insufficiency......10-29 ng/mL       Vitamin D sufficiency........> or equal to 30 ng/mL  Vitamin D toxicity............>100 ng/mL       Component      Latest Ref Rng & Units 10/11/2022   Prolactin      5.2 - 26.5 ng/mL 19.7   Free T4      0.71 - 1.51 ng/dL 0.93       Assessment and Plan     Pituitary adenoma  MRI stable 2021  Due to see Dr. Willis again with imaging this year. She will schedule    Secondary adrenal insufficiency  On adequate replacement dose.  Cont HC 10/5 mg  aware of sick day rules        Secondary hypothyroidism  Check FT4 today  Cont levothyroxine 75 mcg daily    Obesity, morbid, BMI 40.0-49.9  Discussed options for weight management including ozempic given preDM , lifestyle change  She is hesitant about medications but will make lifestyle changes     IGT (impaired glucose tolerance)  Update A1c today        RTC 1 year    Trudy Qiu MD

## 2023-05-31 NOTE — ASSESSMENT & PLAN NOTE
Discussed options for weight management including ozempic given preDM , lifestyle change  She is hesitant about medications but will make lifestyle changes

## 2023-06-22 ENCOUNTER — OCCUPATIONAL HEALTH (OUTPATIENT)
Dept: URGENT CARE | Facility: CLINIC | Age: 58
End: 2023-06-22

## 2023-06-22 DIAGNOSIS — Z02.89 ENCOUNTER FOR EXAMINATION REQUIRED BY DEPARTMENT OF TRANSPORTATION (DOT): Primary | ICD-10-CM

## 2023-06-22 PROCEDURE — 99499 PHYSICAL, RECERT DOT/CDL: ICD-10-PCS | Mod: S$GLB,,, | Performed by: PHYSICIAN ASSISTANT

## 2023-06-22 PROCEDURE — 99499 UNLISTED E&M SERVICE: CPT | Mod: S$GLB,,, | Performed by: PHYSICIAN ASSISTANT

## 2023-07-18 ENCOUNTER — OFFICE VISIT (OUTPATIENT)
Dept: PRIMARY CARE CLINIC | Facility: CLINIC | Age: 58
End: 2023-07-18
Payer: COMMERCIAL

## 2023-07-18 ENCOUNTER — HOSPITAL ENCOUNTER (OUTPATIENT)
Dept: RADIOLOGY | Facility: HOSPITAL | Age: 58
Discharge: HOME | End: 2023-07-18
Attending: INTERNAL MEDICINE
Payer: COMMERCIAL

## 2023-07-18 VITALS
OXYGEN SATURATION: 96 % | WEIGHT: 217.81 LBS | SYSTOLIC BLOOD PRESSURE: 138 MMHG | HEIGHT: 60 IN | BODY MASS INDEX: 42.76 KG/M2 | DIASTOLIC BLOOD PRESSURE: 78 MMHG | HEART RATE: 67 BPM

## 2023-07-18 VITALS — BODY MASS INDEX: 42.6 KG/M2 | WEIGHT: 217 LBS | HEIGHT: 60 IN

## 2023-07-18 DIAGNOSIS — G47.00 INSOMNIA, UNSPECIFIED TYPE: ICD-10-CM

## 2023-07-18 DIAGNOSIS — Z12.11 SCREENING FOR COLORECTAL CANCER: ICD-10-CM

## 2023-07-18 DIAGNOSIS — G47.33 OSA ON CPAP: ICD-10-CM

## 2023-07-18 DIAGNOSIS — E78.5 HYPERLIPIDEMIA, UNSPECIFIED HYPERLIPIDEMIA TYPE: ICD-10-CM

## 2023-07-18 DIAGNOSIS — Z00.00 WELLNESS EXAMINATION: Primary | ICD-10-CM

## 2023-07-18 DIAGNOSIS — Z12.12 SCREENING FOR COLORECTAL CANCER: ICD-10-CM

## 2023-07-18 DIAGNOSIS — Z12.31 ENCOUNTER FOR SCREENING MAMMOGRAM FOR MALIGNANT NEOPLASM OF BREAST: ICD-10-CM

## 2023-07-18 DIAGNOSIS — D35.2 PITUITARY ADENOMA: ICD-10-CM

## 2023-07-18 DIAGNOSIS — I10 PRIMARY HYPERTENSION: ICD-10-CM

## 2023-07-18 DIAGNOSIS — E11.9 TYPE 2 DIABETES MELLITUS WITHOUT COMPLICATION, WITHOUT LONG-TERM CURRENT USE OF INSULIN: ICD-10-CM

## 2023-07-18 DIAGNOSIS — E27.49 SECONDARY ADRENAL INSUFFICIENCY: ICD-10-CM

## 2023-07-18 DIAGNOSIS — E03.8 SECONDARY HYPOTHYROIDISM: ICD-10-CM

## 2023-07-18 PROBLEM — R73.02 IGT (IMPAIRED GLUCOSE TOLERANCE): Status: RESOLVED | Noted: 2022-04-11 | Resolved: 2023-07-18

## 2023-07-18 PROCEDURE — 77067 MAMMO DIGITAL SCREENING BILAT WITH TOMO: ICD-10-PCS | Mod: 26,,, | Performed by: RADIOLOGY

## 2023-07-18 PROCEDURE — 99396 PR PREVENTIVE VISIT,EST,40-64: ICD-10-PCS | Mod: S$GLB,,, | Performed by: INTERNAL MEDICINE

## 2023-07-18 PROCEDURE — 77063 BREAST TOMOSYNTHESIS BI: CPT | Mod: 26,,, | Performed by: RADIOLOGY

## 2023-07-18 PROCEDURE — 3075F PR MOST RECENT SYSTOLIC BLOOD PRESS GE 130-139MM HG: ICD-10-PCS | Mod: CPTII,S$GLB,, | Performed by: INTERNAL MEDICINE

## 2023-07-18 PROCEDURE — 3075F SYST BP GE 130 - 139MM HG: CPT | Mod: CPTII,S$GLB,, | Performed by: INTERNAL MEDICINE

## 2023-07-18 PROCEDURE — 1159F PR MEDICATION LIST DOCUMENTED IN MEDICAL RECORD: ICD-10-PCS | Mod: CPTII,S$GLB,, | Performed by: INTERNAL MEDICINE

## 2023-07-18 PROCEDURE — 99396 PREV VISIT EST AGE 40-64: CPT | Mod: S$GLB,,, | Performed by: INTERNAL MEDICINE

## 2023-07-18 PROCEDURE — 77063 MAMMO DIGITAL SCREENING BILAT WITH TOMO: ICD-10-PCS | Mod: 26,,, | Performed by: RADIOLOGY

## 2023-07-18 PROCEDURE — 3078F PR MOST RECENT DIASTOLIC BLOOD PRESSURE < 80 MM HG: ICD-10-PCS | Mod: CPTII,S$GLB,, | Performed by: INTERNAL MEDICINE

## 2023-07-18 PROCEDURE — 77067 SCR MAMMO BI INCL CAD: CPT | Mod: TC

## 2023-07-18 PROCEDURE — 3008F BODY MASS INDEX DOCD: CPT | Mod: CPTII,S$GLB,, | Performed by: INTERNAL MEDICINE

## 2023-07-18 PROCEDURE — 77067 SCR MAMMO BI INCL CAD: CPT | Mod: 26,,, | Performed by: RADIOLOGY

## 2023-07-18 PROCEDURE — 99999 PR PBB SHADOW E&M-EST. PATIENT-LVL IV: CPT | Mod: PBBFAC,,, | Performed by: INTERNAL MEDICINE

## 2023-07-18 PROCEDURE — 3044F HG A1C LEVEL LT 7.0%: CPT | Mod: CPTII,S$GLB,, | Performed by: INTERNAL MEDICINE

## 2023-07-18 PROCEDURE — 3008F PR BODY MASS INDEX (BMI) DOCUMENTED: ICD-10-PCS | Mod: CPTII,S$GLB,, | Performed by: INTERNAL MEDICINE

## 2023-07-18 PROCEDURE — 3044F PR MOST RECENT HEMOGLOBIN A1C LEVEL <7.0%: ICD-10-PCS | Mod: CPTII,S$GLB,, | Performed by: INTERNAL MEDICINE

## 2023-07-18 PROCEDURE — 99999 PR PBB SHADOW E&M-EST. PATIENT-LVL IV: ICD-10-PCS | Mod: PBBFAC,,, | Performed by: INTERNAL MEDICINE

## 2023-07-18 PROCEDURE — 1159F MED LIST DOCD IN RCRD: CPT | Mod: CPTII,S$GLB,, | Performed by: INTERNAL MEDICINE

## 2023-07-18 PROCEDURE — 3078F DIAST BP <80 MM HG: CPT | Mod: CPTII,S$GLB,, | Performed by: INTERNAL MEDICINE

## 2023-07-18 NOTE — PROGRESS NOTES
[facility] Note    Chief Complaint      Chief Complaint   Patient presents with    Annual Exam    Hypertension    Establish Care     History of Present Illness      Matt Lomax is a 57 y.o. female who presents today for chief complaint annual wellness and est care. Patient is new to me, previous PCP [facility]      PCP: Eloise Schneider MD  Patient comes to appointment alone.     HPI last sánchez Paddy 3.22 annual   Labs 5.23 a1c T4 cmp, she needs CBC qnd Lipids   She is not feeling well for a few days, nasal congestion, cough, fatigue, no home covid test   She lives in  but keeps a [employer] Box, she works in [employer] as a , hoping to retire next year, back to work 8.1    DM2 last a1c 5.7 - diet controlled - post pituitary sx, eye exam Dr Bender in the past. She had DKA post op   HTN Prescribed triamterene-hydrochlorothiazide 37.5-25 mg by mouth daily and verapamil 240 mg by mouth daily. At goal today, she does not check at home  HLD Prescribed pravastatin 20 mg by mouth daily due for lipid panel, no statin intolerance     Pan hypopitutitarism since pit adneiomna r/s with Dr Willis . MaykelH, AI, DM  Pit Adenoma/prolactinima  she sees Dr Quigley in Endo: , possible prolactinoma, ~ in 2006 and started on cabergoline at that time due to elevated prolactin, was Monitored with MRI with increase in size over time so she was referred to  and she had surgery in 7/2014.  Prolactin has been normal since time of surgery off cabergoline.  Lat MRI 3.21. She had post op CSF leak     HoTH on levothyroxine 75mcg  Adrenal insufficiency: she sees Dr Quigley in Endo:Takes hydrocortisone 10 mg by mouth with breakfast and half a tablet with dinner, has not discussed stress dose   Obesity BMI 42 with comorbid HTN DM   JUAN and insomnia on ambien 5 qhs, she uses CPAP qhs, she feels better with use, may need new mask     HM   Mmg 6.21  Cscope 8.17- -5 years DUE NOW, ref sent to [facility]    Pap 11.22 Melly     She is in nursing school   Active Problem List with Overview Notes    Diagnosis Date Noted    Type 2 diabetes mellitus without complication, without long-term current use of insulin 2020     At goal no change continue diet control       Insomnia 2019    Hyperlipidemia 2018    Secondary hypothyroidism 2015    H/O secondary hypogonadism 2015    Secondary adrenal insufficiency 10/21/2014    Pituitary adenoma 2014    JUNA on CPAP 2014    Obesity, morbid, BMI 40.0-49.9 2014       Health Maintenance   Topic Date Due    Mammogram  2022    Hemoglobin A1c  2024    TETANUS VACCINE  2026    Lipid Panel  10/18/2027    Hepatitis C Screening  Completed       Past Medical History:   Diagnosis Date    HTN (hypertension)     Hyperprolactinemia     Morbid obesity     JUAN (obstructive sleep apnea)     Pituitary adenoma     Secondary hypothyroidism 2015    Type II or unspecified type diabetes mellitus without mention of complication, uncontrolled     Vitamin D deficiency disease        Past Surgical History:   Procedure Laterality Date    Breast Reduction       SECTION      x 3    COLONOSCOPY N/A 2017    Procedure: COLONOSCOPY;  Surgeon: Ciro Rebolledo MD;  Location: [facility] ([address] [place] [address]);  Service: Endoscopy;  Laterality: N/A;    TOTAL REDUCTION MAMMOPLASTY Bilateral     TRANSPHENOIDAL PITUITARY RESECTION  14    TUBAL LIGATION         family history includes Diabetes in her maternal aunt and mother; Heart disease in her mother; Hypertension in her father and mother; No Known Problems in her brother, brother, daughter, daughter, maternal grandfather, maternal grandmother, maternal uncle, paternal aunt, paternal grandfather, paternal grandmother, paternal uncle, sister, and son.    Social History     Tobacco Use    Smoking status: Never     Passive exposure: Never    Smokeless tobacco: Never   Substance Use Topics     Alcohol use: No    Drug use: No       Review of Systems   Constitutional:  Negative for chills, fever, malaise/fatigue and weight loss.   Respiratory:  Negative for cough, sputum production, shortness of breath and wheezing.    Cardiovascular:  Negative for chest pain, palpitations, orthopnea and leg swelling.   Gastrointestinal:  Negative for constipation, diarrhea, nausea and vomiting.   Genitourinary:  Negative for dysuria, frequency, hematuria and urgency.      Outpatient Encounter Medications as of 7/18/2023   Medication Sig Note Dispense Refill    hydrocortisone (CORTEF) 10 MG Tab TAKE 1 TABLET BY MOUTH ONCE DAILY WITH BREAKFAST AND  ONE-HALF  TABLET  ONCE  DAILY  WITH  DINNER  45 tablet 11    levothyroxine (SYNTHROID) 75 MCG tablet TAKE 1 TABLET BY MOUTH BEFORE BREAKFAST  90 tablet 3    multivitamin (THERAGRAN) per tablet Take 1 tablet by mouth every morning.  7/14/2014: Hold the morning of surgery.       pravastatin (PRAVACHOL) 20 MG tablet Take 1 tablet (20 mg total) by mouth once daily. For Cholesterol.  90 tablet 3    triamterene-hydrochlorothiazide 37.5-25 mg (DYAZIDE) 37.5-25 mg per capsule Take 1 capsule by mouth once daily.  90 capsule 3    verapamiL (CALAN-SR) 240 MG CR tablet TAKE 1 TABLET (240 MG TOTAL) BY MOUTH ONCE DAILY.  90 tablet 3    zolpidem (AMBIEN) 5 MG Tab Take 1 tablet (5 mg total) by mouth nightly as needed (Insomnia.).  30 tablet 2    [DISCONTINUED] conjugated estrogens (PREMARIN) vaginal cream Place 1g vaginally nightly x 2 weeks, then twice weekly thereafter (Patient not taking: Reported on 7/18/2023)  30 g 3    [DISCONTINUED] ibuprofen (ADVIL,MOTRIN) 800 MG tablet Take 1 tablet (800 mg total) by mouth every 8 (eight) hours as needed for Pain.  30 tablet 2     No facility-administered encounter medications on file as of 7/18/2023.        Review of patient's allergies indicates:   Allergen Reactions    Lisinopril Hives           Physical Exam      Vital Signs  Pulse: 67  SpO2: 96  %  BP: 138/78  BP Location: Right arm  Patient Position: Sitting  Height and Weight  Height: 5' (152.4 cm)  Weight: 98.8 kg (217 lb 13 oz)  BSA (Calculated - sq m): 2.05 sq meters  BMI (Calculated): 42.5  Weight in (lb) to have BMI = 25: 127.7]    Physical Exam  Vitals reviewed.   Constitutional:       General: She is not in acute distress.     Appearance: She is well-developed. She is not diaphoretic.   HENT:      Head: Normocephalic and atraumatic.      Right Ear: External ear normal.      Left Ear: External ear normal.      Nose: Nose normal.   Eyes:      General:         Right eye: No discharge.         Left eye: No discharge.      Conjunctiva/sclera: Conjunctivae normal.   Cardiovascular:      Rate and Rhythm: Normal rate and regular rhythm.      Heart sounds: Normal heart sounds.   Pulmonary:      Effort: Pulmonary effort is normal. No respiratory distress.      Breath sounds: Normal breath sounds.   Musculoskeletal:         General: Normal range of motion.      Cervical back: Normal range of motion.   Skin:     Coloration: Skin is not pale.      Findings: No rash.   Neurological:      Mental Status: She is alert and oriented to person, place, and time.   Psychiatric:         Behavior: Behavior normal.         Thought Content: Thought content normal.        Laboratory:  CBC:  No results for input(s): WBC, RBC, HGB, HCT, PLT, MCV, MCH, MCHC in the last 2160 hours.  CMP:  Recent Labs   Lab Result Units 05/31/23  1049   Glucose mg/dL 109   Calcium mg/dL 9.8   Albumin g/dL 4.0   Total Protein g/dL 7.5   Sodium mmol/L 140   Potassium mmol/L 3.6   CO2 mmol/L 29   Chloride mmol/L 102   BUN mg/dL 13   Alkaline Phosphatase U/L 87   ALT U/L 13   AST U/L 22   Total Bilirubin mg/dL 0.5     URINALYSIS:  No results for input(s): COLORU, CLARITYU, SPECGRAV, PHUR, PROTEINUA, GLUCOSEU, BILIRUBINCON, BLOODU, WBCU, RBCU, BACTERIA, MUCUS, NITRITE, LEUKOCYTESUR, UROBILINOGEN, HYALINECASTS in the last 2160 hours.   LIPIDS:  No  results for input(s): TSH, HDL, CHOL, TRIG, LDLCALC, CHOLHDL, NONHDLCHOL, TOTALCHOLEST in the last 2160 hours.  TSH:  No results for input(s): TSH in the last 2160 hours.  A1C:  Recent Labs   Lab Result Units 05/31/23  1049   Hemoglobin A1C % 5.7*       Radiology:      Assessment/Plan     Matt Lomax is a 57 y.o.female with:    1. Wellness examination    2. Screening for colorectal cancer  -     Ambulatory referral/consult to Endo Procedure ; Future; Expected date: 07/19/2023    3. Hyperlipidemia, unspecified hyperlipidemia type  Assessment & Plan:  Due for labs no change to pravastatin    Orders:  -     Lipid Panel; Future; Expected date: 07/18/2023    4. Encounter for screening mammogram for malignant neoplasm of breast  -     Mammo Digital Screening Bilat w/ Pato; Future; Expected date: 07/18/2023    5. Primary hypertension  -     CBC Without Differential; Future; Expected date: 07/18/2023    6. Secondary hypothyroidism  Assessment & Plan:  Stable, follows with endocrine, no change       7. Secondary adrenal insufficiency  Assessment & Plan:  Stable, follows with endocrine, no change       8. JUAN on CPAP  Assessment & Plan:  Discussed nasal pillows       9. Insomnia, unspecified type  Assessment & Plan:  Ct ambien       10. Pituitary adenoma  Assessment & Plan:  S/p resection followed by endocrine       11. Type 2 diabetes mellitus without complication, without long-term current use of insulin  Overview:  At goal no change continue diet control            Use of the [facility] Patient Portal discussed and encouraged during today's visit  -Continue current medications and maintain follow up with specialists.  Return to clinic in 6 months .  No future appointments.    Eloise Schneider MD  7/18/2023 9:30 AM    Primary Care Internal Medicine

## 2023-08-02 ENCOUNTER — TELEPHONE (OUTPATIENT)
Dept: PRIMARY CARE CLINIC | Facility: CLINIC | Age: 58
End: 2023-08-02
Payer: COMMERCIAL

## 2023-08-02 NOTE — TELEPHONE ENCOUNTER
----- Message from Eloise Schneider MD sent at 8/2/2023 12:56 PM CDT -----  I discussed with dr qiu we could adjust her bp meds, can she come in for bp ck  ----- Message -----  From: Trudy Qiu MD  Sent: 7/18/2023  11:45 AM CDT  To: Eloise Schneider MD    No issues from my standpoint!  ----- Message -----  From: Eloise Schneider MD  Sent: 7/18/2023  10:07 AM CDT  To: Trudy Qiu MD    Any issue with changing her bp meds away from the CCB and diuretics with her Na

## 2023-10-10 ENCOUNTER — PATIENT MESSAGE (OUTPATIENT)
Dept: PRIMARY CARE CLINIC | Facility: CLINIC | Age: 58
End: 2023-10-10
Payer: COMMERCIAL

## 2023-10-10 ENCOUNTER — CLINICAL SUPPORT (OUTPATIENT)
Dept: OTHER | Facility: CLINIC | Age: 58
End: 2023-10-10
Payer: COMMERCIAL

## 2023-10-10 DIAGNOSIS — Z00.8 ENCOUNTER FOR OTHER GENERAL EXAMINATION: ICD-10-CM

## 2023-10-11 VITALS
HEIGHT: 61 IN | WEIGHT: 209 LBS | BODY MASS INDEX: 39.46 KG/M2 | SYSTOLIC BLOOD PRESSURE: 136 MMHG | DIASTOLIC BLOOD PRESSURE: 78 MMHG

## 2023-10-11 LAB
HDLC SERPL-MCNC: 29 MG/DL
POC CHOLESTEROL, LDL (DOCK): 114 MG/DL
POC CHOLESTEROL, TOTAL: 174 MG/DL
POC GLUCOSE, FASTING: 99 MG/DL (ref 60–110)
TRIGL SERPL-MCNC: 172 MG/DL

## 2023-10-19 ENCOUNTER — OFFICE VISIT (OUTPATIENT)
Dept: URGENT CARE | Facility: CLINIC | Age: 58
End: 2023-10-19
Payer: COMMERCIAL

## 2023-10-19 VITALS
DIASTOLIC BLOOD PRESSURE: 78 MMHG | HEART RATE: 62 BPM | RESPIRATION RATE: 18 BRPM | HEIGHT: 61 IN | WEIGHT: 209 LBS | OXYGEN SATURATION: 99 % | TEMPERATURE: 98 F | BODY MASS INDEX: 39.46 KG/M2 | SYSTOLIC BLOOD PRESSURE: 131 MMHG

## 2023-10-19 DIAGNOSIS — A08.4 VIRAL GASTROENTERITIS: Primary | ICD-10-CM

## 2023-10-19 DIAGNOSIS — R11.0 NAUSEA: ICD-10-CM

## 2023-10-19 DIAGNOSIS — R50.9 FEVER, UNSPECIFIED FEVER CAUSE: ICD-10-CM

## 2023-10-19 LAB
CTP QC/QA: YES
CTP QC/QA: YES
POC MOLECULAR INFLUENZA A AGN: NEGATIVE
POC MOLECULAR INFLUENZA B AGN: NEGATIVE
SARS-COV-2 AG RESP QL IA.RAPID: NEGATIVE

## 2023-10-19 PROCEDURE — 99204 OFFICE O/P NEW MOD 45 MIN: CPT | Mod: S$GLB,,,

## 2023-10-19 PROCEDURE — 87502 POCT INFLUENZA A/B MOLECULAR: ICD-10-PCS | Mod: QW,S$GLB,,

## 2023-10-19 PROCEDURE — 87811 SARS-COV-2 COVID19 W/OPTIC: CPT | Mod: QW,S$GLB,,

## 2023-10-19 PROCEDURE — 87811 SARS CORONAVIRUS 2 ANTIGEN POCT, MANUAL READ: ICD-10-PCS | Mod: QW,S$GLB,,

## 2023-10-19 PROCEDURE — 99204 PR OFFICE/OUTPT VISIT, NEW, LEVL IV, 45-59 MIN: ICD-10-PCS | Mod: S$GLB,,,

## 2023-10-19 PROCEDURE — 87502 INFLUENZA DNA AMP PROBE: CPT | Mod: QW,S$GLB,,

## 2023-10-19 RX ORDER — ONDANSETRON 4 MG/1
4 TABLET, ORALLY DISINTEGRATING ORAL EVERY 8 HOURS PRN
Qty: 9 TABLET | Refills: 0 | Status: SHIPPED | OUTPATIENT
Start: 2023-10-19 | End: 2023-10-22

## 2023-10-19 NOTE — LETTER
October 19, 2023      [facility] [address] [address]  Phone: [phone]  Fax: [phone]       Patient: Matt Lomax   YOB: 1965  Date of Visit: 10/19/2023    To Whom It May Concern:    Elroy Lomax  was at [facility] on 10/19/2023. The patient may return to work/school on 10/23/2023 with no restrictions. If you have any questions or concerns, or if I can be of further assistance, please do not hesitate to contact me.    Sincerely,    Alyssia Perez, NP

## 2023-10-19 NOTE — LETTER
October 19, 2023      [facility]gent [facility] [address] [address]  Phone: [phone]  Fax: [phone]       Patient: Matt Lomax   YOB: 1965  Date of Visit: 10/19/2023    To Whom It May Concern:    Elroy Lomax  was at [facility] on 10/19/2023. The patient may return to work/school on 10/20/2023 with no restrictions. If you have any questions or concerns, or if I can be of further assistance, please do not hesitate to contact me.    Sincerely,    Alyssia Perez, NP

## 2023-10-20 NOTE — PATIENT INSTRUCTIONS
Negative for flu and COVID today.     Try a bland diet for the next few days. I included information on this in your discharge paperwork. Avoid acidic/spicy/processed foods.     Take zofran as directed for nausea and vomiting.     You can take over the counter pepto or imodium for symptoms but please understand that this may cause constipation. Drink plenty of fluids---water and electrolyte replacement drinks like Pedialyte.    The recommended daily fluid intake for women is 2.7 liters per day (five 16 oz bottles of water).     Wash hands frequently. Sanitize areas at home. Stay home if you are having fevers, chills, body aches, fatigue. Symptoms should resolve in in 7 days from symptom start. There is no specific treatment for viral illnesses. We treat symptoms with supportive care. Getting plenty of rest but completing light activities daily, drinking plenty of fluids, eating a nutritious diet, and managing stress levels can aid in faster recovery.      Follow up with PCP for ongoing symptoms past 7 days.    Go to the ER if your abdominal pain becomes severe, unable to orally hydrate, chest pain, shortness of breath, palpitations, dizziness, large blood loss.

## 2023-10-20 NOTE — PROGRESS NOTES
"Subjective:      Patient ID: Matt Lomax is a 57 y.o. female.    Vitals:  height is 5' 1" (1.549 m) and weight is 94.8 kg (209 lb). Her oral temperature is 98.2 °F (36.8 °C). Her blood pressure is 131/78 and her pulse is 62. Her respiration is 18 and oxygen saturation is 99%.     Chief Complaint: Fever    Past Medical History:  No date: HTN (hypertension)  No date: Hyperprolactinemia  No date: Morbid obesity  No date: JUAN (obstructive sleep apnea)  No date: Pituitary adenoma  4/21/2015: Secondary hypothyroidism  No date: Type II or unspecified type diabetes mellitus without   mention of complication, uncontrolled  No date: Vitamin D deficiency disease      Fever   This is a new problem. The current episode started yesterday. The problem occurs constantly. The problem has been gradually worsening. Her temperature was unmeasured prior to arrival. Associated symptoms include diarrhea, muscle aches, nausea and sleepiness. Pertinent negatives include no abdominal pain, chest pain, congestion, coughing, ear pain, headaches, rash, sore throat, urinary pain, vomiting or wheezing. She has tried nothing for the symptoms. The treatment provided no relief.       Constitution: Positive for chills, fatigue and fever.   HENT:  Negative for ear pain, congestion, postnasal drip and sore throat.    Cardiovascular:  Negative for chest pain.   Respiratory:  Negative for cough and wheezing.    Gastrointestinal:  Positive for nausea and diarrhea. Negative for abdominal pain and vomiting.   Genitourinary:  Negative for dysuria.   Musculoskeletal:  Positive for muscle ache.   Skin:  Negative for rash.   Neurological:  Negative for headaches.      Objective:     Physical Exam   Constitutional: She is oriented to person, place, and time. She appears well-developed.   HENT:   Head: Normocephalic and atraumatic.   Ears:   Right Ear: Tympanic membrane, external ear and ear canal normal.   Left Ear: Tympanic membrane, external ear " and ear canal normal.   Nose: Nose normal. No rhinorrhea or congestion.   Mouth/Throat: Mucous membranes are normal. No oropharyngeal exudate or posterior oropharyngeal erythema.   Eyes: Conjunctivae and lids are normal. Pupils are equal, round, and reactive to light.   Neck: Trachea normal. Neck supple.   Cardiovascular: Normal rate, regular rhythm and normal heart sounds.   Pulmonary/Chest: Effort normal and breath sounds normal. No stridor. No respiratory distress. She has no wheezes. She has no rhonchi. She has no rales.   Abdominal: Normal appearance and bowel sounds are normal. She exhibits no distension and no mass. Soft. There is no abdominal tenderness. There is no rebound and no guarding.   Musculoskeletal: Normal range of motion.         General: Normal range of motion.   Neurological: She is alert and oriented to person, place, and time. She has normal strength.   Skin: Skin is warm, dry, intact, not diaphoretic and not pale.   Psychiatric: Her speech is normal and behavior is normal. Judgment and thought content normal.   Nursing note and vitals reviewed.      Assessment:     1. Viral gastroenteritis    2. Fever, unspecified fever cause    3. Nausea        Plan:     Results for orders placed or performed in visit on 10/19/23   SARS Coronavirus 2 Antigen, POCT Manual Read   Result Value Ref Range    SARS Coronavirus 2 Antigen Negative Negative     Acceptable Yes    POCT Influenza A/B MOLECULAR   Result Value Ref Range    POC Molecular Influenza A Ag Negative Negative, Not Reported    POC Molecular Influenza B Ag Negative Negative, Not Reported     Acceptable Yes          Viral gastroenteritis    Fever, unspecified fever cause  -     SARS Coronavirus 2 Antigen, POCT Manual Read  -     POCT Influenza A/B MOLECULAR    Nausea  -     ondansetron (ZOFRAN-ODT) 4 MG TbDL; Take 1 tablet (4 mg total) by mouth every 8 (eight) hours as needed (nausea).  Dispense: 9 tablet; Refill:  0            Discussed results/diagnosis/plan with patient in clinic. Strict precautions given to patient to monitor for worsening signs and symptoms. Advised to follow up with PCP or specialist.  Explained side effects of medications prescribed with patient and informed him/her to discontinue use if he/she has any side effects and to inform UC or PCP if this occurs. All questions answered. Strict ED verses clinic return precautions stressed and given in depth. Advised if symptoms worsens of fail to improve he/she should go to the Emergency Room. Discharge and follow-up instructions given verbally/printed with the patient who expressed understanding and willingness to comply with my recommendations. Patient voiced understanding and in agreement with current treatment plan. Patient exits the exam room in no acute distress. Conversant and engaged during discharge discussion, verbalized understanding.

## 2023-10-25 ENCOUNTER — TELEPHONE (OUTPATIENT)
Dept: ENDOSCOPY | Facility: HOSPITAL | Age: 58
End: 2023-10-25

## 2023-10-25 ENCOUNTER — CLINICAL SUPPORT (OUTPATIENT)
Dept: ENDOSCOPY | Facility: HOSPITAL | Age: 58
End: 2023-10-25
Attending: INTERNAL MEDICINE
Payer: COMMERCIAL

## 2023-10-25 DIAGNOSIS — Z12.12 SCREENING FOR COLORECTAL CANCER: ICD-10-CM

## 2023-10-25 DIAGNOSIS — Z12.11 SCREENING FOR COLORECTAL CANCER: Primary | ICD-10-CM

## 2023-10-25 DIAGNOSIS — Z12.11 SCREENING FOR COLORECTAL CANCER: ICD-10-CM

## 2023-10-25 DIAGNOSIS — Z12.12 SCREENING FOR COLORECTAL CANCER: Primary | ICD-10-CM

## 2023-10-25 NOTE — PLAN OF CARE
We attempted to reach you to schedule a procedure . Im sorry that we missed you. Please contact Endoscopy Scheduling Department at [phone]. Msg. Sent to pt. portal Thank you

## 2023-10-25 NOTE — TELEPHONE ENCOUNTER
Spoke to patient to schedule procedure(s) Colonoscopy       Physician to perform procedure(s) Dr. SANDEE Rebolledo  Date of Procedure (s) 2/2/24  Arrival Time 10:30 AM  Time of Procedure(s) 11:30 AM   Location of Procedure(s) [place] [address]nd Floor  Type of Rx Prep sent to patient: PEG  Instructions provided to patient via [facility]    Patient was informed on the following information and verbalized understanding. Screening questionnaire reviewed with patient and complete. If procedure requires anesthesia, a responsible adult needs to be present to accompany the patient home, patient cannot drive after receiving anesthesia. Appointment details are tentative, especially check-in time. Patient will receive a prep-op call 4 days prior to confirm check-in time for procedure. If applicable the patient should contact their pharmacy to verify Rx for procedure prep is ready for pick-up. Patient was advised to call the scheduling department at [phone] if pharmacy states no Rx is available. Patient was advised to call the endoscopy scheduling department if any questions or concerns arise.      [facility] Scheduling Department

## 2023-11-03 ENCOUNTER — OCCUPATIONAL HEALTH (OUTPATIENT)
Dept: URGENT CARE | Facility: CLINIC | Age: 58
End: 2023-11-03

## 2023-11-03 DIAGNOSIS — Z02.83 ENCOUNTER FOR DRUG SCREENING: Primary | ICD-10-CM

## 2023-11-03 LAB
BREATH ALCOHOL: 0
CTP QC/QA: YES
POC 10 PANEL DRUG SCREEN: NEGATIVE

## 2023-11-03 PROCEDURE — 80305 POCT RAPID DRUG SCREEN 10 PANEL: ICD-10-PCS | Mod: S$GLB,,, | Performed by: PHYSICIAN ASSISTANT

## 2023-11-03 PROCEDURE — 80305 DRUG TEST PRSMV DIR OPT OBS: CPT | Mod: S$GLB,,, | Performed by: PHYSICIAN ASSISTANT

## 2023-11-03 PROCEDURE — 82075 POCT BREATH ALCOHOL TEST: ICD-10-PCS | Mod: S$GLB,,, | Performed by: PHYSICIAN ASSISTANT

## 2023-11-03 PROCEDURE — 82075 ASSAY OF BREATH ETHANOL: CPT | Mod: S$GLB,,, | Performed by: PHYSICIAN ASSISTANT

## 2023-11-03 NOTE — LETTER
[facility]  [address], [address]  [place] [address]  Phone: [phone]  Fax: [phone]  [facility]: [id number]    Pt Name: Matt Lomax  Injury Date:     Employee ID: [id number] Date of First Treatment: 11/03/2023   Company: [employer]      Appointment Time: 08:20 AM Arrived: 0814   Provider: [facility] Time Out:0836     Office Treatment:   1. Encounter for drug screening                     Return Appointment: NA

## 2023-11-20 ENCOUNTER — OFFICE VISIT (OUTPATIENT)
Dept: URGENT CARE | Facility: CLINIC | Age: 58
End: 2023-11-20
Payer: COMMERCIAL

## 2023-11-20 VITALS
SYSTOLIC BLOOD PRESSURE: 141 MMHG | HEIGHT: 61 IN | HEART RATE: 58 BPM | DIASTOLIC BLOOD PRESSURE: 86 MMHG | RESPIRATION RATE: 16 BRPM | OXYGEN SATURATION: 97 % | TEMPERATURE: 98 F | WEIGHT: 209 LBS | BODY MASS INDEX: 39.46 KG/M2

## 2023-11-20 DIAGNOSIS — S39.012A STRAIN OF LUMBAR REGION, INITIAL ENCOUNTER: Primary | ICD-10-CM

## 2023-11-20 LAB
BILIRUB UR QL STRIP: NEGATIVE
GLUCOSE UR QL STRIP: NEGATIVE
KETONES UR QL STRIP: NEGATIVE
LEUKOCYTE ESTERASE UR QL STRIP: NEGATIVE
PH, POC UA: 5.5 (ref 5–8)
POC BLOOD, URINE: NEGATIVE
POC NITRATES, URINE: NEGATIVE
PROT UR QL STRIP: NEGATIVE
SP GR UR STRIP: 1.02 (ref 1–1.03)
UROBILINOGEN UR STRIP-ACNC: NORMAL (ref 0.1–1.1)

## 2023-11-20 PROCEDURE — 99213 OFFICE O/P EST LOW 20 MIN: CPT | Mod: 25,S$GLB,, | Performed by: FAMILY MEDICINE

## 2023-11-20 PROCEDURE — 96372 THER/PROPH/DIAG INJ SC/IM: CPT | Mod: S$GLB,,, | Performed by: FAMILY MEDICINE

## 2023-11-20 PROCEDURE — 81003 POCT URINALYSIS, DIPSTICK, MANUAL, W/O SCOPE: ICD-10-PCS | Mod: QW,S$GLB,, | Performed by: FAMILY MEDICINE

## 2023-11-20 PROCEDURE — 96372 PR INJECTION,THERAP/PROPH/DIAG2ST, IM OR SUBCUT: ICD-10-PCS | Mod: S$GLB,,, | Performed by: FAMILY MEDICINE

## 2023-11-20 PROCEDURE — 87077 CULTURE AEROBIC IDENTIFY: CPT | Performed by: FAMILY MEDICINE

## 2023-11-20 PROCEDURE — 87086 URINE CULTURE/COLONY COUNT: CPT | Performed by: FAMILY MEDICINE

## 2023-11-20 PROCEDURE — 87088 URINE BACTERIA CULTURE: CPT | Performed by: FAMILY MEDICINE

## 2023-11-20 PROCEDURE — 99213 PR OFFICE/OUTPT VISIT, EST, LEVL III, 20-29 MIN: ICD-10-PCS | Mod: 25,S$GLB,, | Performed by: FAMILY MEDICINE

## 2023-11-20 PROCEDURE — 81003 URINALYSIS AUTO W/O SCOPE: CPT | Mod: QW,S$GLB,, | Performed by: FAMILY MEDICINE

## 2023-11-20 PROCEDURE — 87186 SC STD MICRODIL/AGAR DIL: CPT | Performed by: FAMILY MEDICINE

## 2023-11-20 RX ORDER — KETOROLAC TROMETHAMINE 30 MG/ML
30 INJECTION, SOLUTION INTRAMUSCULAR; INTRAVENOUS
Status: COMPLETED | OUTPATIENT
Start: 2023-11-20 | End: 2023-11-20

## 2023-11-20 RX ORDER — IBUPROFEN 800 MG/1
800 TABLET ORAL EVERY 8 HOURS PRN
Qty: 30 TABLET | Refills: 1 | Status: SHIPPED | OUTPATIENT
Start: 2023-11-20 | End: 2024-11-19

## 2023-11-20 RX ORDER — CYCLOBENZAPRINE HCL 10 MG
10 TABLET ORAL 3 TIMES DAILY PRN
Qty: 21 TABLET | Refills: 0 | Status: SHIPPED | OUTPATIENT
Start: 2023-11-20 | End: 2023-11-30

## 2023-11-20 RX ADMIN — KETOROLAC TROMETHAMINE 30 MG: 30 INJECTION, SOLUTION INTRAMUSCULAR; INTRAVENOUS at 03:11

## 2023-11-20 NOTE — PROGRESS NOTES
"Subjective:      Patient ID: Matt Lomax is a 57 y.o. female.    Vitals:  height is 5' 0.98" (1.549 m) and weight is 94.8 kg (209 lb). Her oral temperature is 98.2 °F (36.8 °C). Her blood pressure is 141/86 (abnormal) and her pulse is 58 (abnormal). Her respiration is 16 and oxygen saturation is 97%.     Chief Complaint: Back Pain    This is a 57 y.o female who presents today with chief complaint of lower back pain, urinary frequency and lower abdominal pain x2 days.   Patient has been taking Ibuprofen and reports no improvement.    Back Pain  This is a new problem. The current episode started in the past 7 days. The quality of the pain is described as aching. The pain does not radiate. The pain is at a severity of 6/10. The pain is moderate. Pertinent negatives include no abdominal pain, bowel incontinence, chest pain, dysuria, fever, headaches, leg pain, numbness, paresis, paresthesias, pelvic pain, perianal numbness, tingling, weakness or weight loss. She has tried NSAIDs for the symptoms. The treatment provided no relief.       Constitution: Negative for fever.   Cardiovascular:  Negative for chest pain.   Gastrointestinal:  Negative for abdominal pain and bowel incontinence.   Genitourinary:  Negative for dysuria and pelvic pain.   Musculoskeletal:  Positive for back pain.   Neurological:  Negative for headaches and numbness.      Objective:     Physical Exam   Constitutional: She is oriented to person, place, and time. obesity  HENT:   Head: Normocephalic and atraumatic.   Cardiovascular: Normal rate, regular rhythm, normal heart sounds and normal pulses.   Abdominal: Normal appearance.   Musculoskeletal:         General: Tenderness (paralumbar muscles bilaterally. negative straight leg raise. neurologically nonfocal) present. No swelling, deformity or signs of injury.   Neurological: no focal deficit. She is alert, oriented to person, place, and time and at baseline. She displays no weakness. "   Nursing note and vitals reviewed.    Results for orders placed or performed in visit on 11/20/23   POCT Urinalysis, Dipstick, Manual, w/o Scope   Result Value Ref Range    POC Blood, Urine Negative Negative    POC Bilirubin, Urine Negative Negative    POC Urobilinogen, Urine Normal 0.1 - 1.1    POC Ketones, Urine Negative Negative    POC Protein, Urine Negative Negative    POC Nitrates, Urine Negative Negative    POC Glucose, Urine Negative Negative    pH, UA 5.5 5 - 8    POC Specific Gravity, Urine 1.020 1.003 - 1.029    POC Leukocytes, Urine Negative Negative      Assessment:     1. Strain of lumbar region, initial encounter        Plan:       Strain of lumbar region, initial encounter  -     POCT Urinalysis, Dipstick, Manual, w/o Scope  -     Culture, Urine  -     ketorolac injection 30 mg  -     cyclobenzaprine (FLEXERIL) 10 MG tablet; Take 1 tablet (10 mg total) by mouth 3 (three) times daily as needed for Muscle spasms.  Dispense: 21 tablet; Refill: 0  -     ibuprofen (ADVIL,MOTRIN) 800 MG tablet; Take 1 tablet (800 mg total) by mouth every 8 (eight) hours as needed for Pain (with food).  Dispense: 30 tablet; Refill: 1    Discussed heat application and medication precautions

## 2023-11-22 LAB — BACTERIA UR CULT: ABNORMAL

## 2023-11-24 ENCOUNTER — TELEPHONE (OUTPATIENT)
Dept: URGENT CARE | Facility: CLINIC | Age: 58
End: 2023-11-24
Payer: COMMERCIAL

## 2023-11-24 DIAGNOSIS — N39.0 URINARY TRACT INFECTION WITHOUT HEMATURIA, SITE UNSPECIFIED: Primary | ICD-10-CM

## 2023-11-24 RX ORDER — FLUCONAZOLE 150 MG/1
150 TABLET ORAL DAILY
Qty: 2 TABLET | Refills: 0 | Status: SHIPPED | OUTPATIENT
Start: 2023-11-24 | End: 2023-11-25

## 2023-11-24 RX ORDER — CIPROFLOXACIN 500 MG/1
500 TABLET ORAL 2 TIMES DAILY
Qty: 14 TABLET | Refills: 0 | Status: SHIPPED | OUTPATIENT
Start: 2023-11-24 | End: 2023-12-01

## 2023-11-24 NOTE — TELEPHONE ENCOUNTER
----- Message from Alta Leal sent at 11/24/2023  9:42 AM CST -----  Contact: [phone]  Patient called for medical advice for test results,  requesting a call back.    Thank you        After consulting with provider, pt was told she had a prescription for abx plus yeast infection medication due to positive culture.

## 2023-12-01 ENCOUNTER — OCCUPATIONAL HEALTH (OUTPATIENT)
Dept: URGENT CARE | Facility: CLINIC | Age: 58
End: 2023-12-01

## 2023-12-01 DIAGNOSIS — Z02.83 ENCOUNTER FOR DRUG SCREENING: Primary | ICD-10-CM

## 2023-12-01 LAB
CTP QC/QA: YES
POC 10 PANEL DRUG SCREEN: NEGATIVE
POC BREATH ALCOHOL: NEGATIVE

## 2023-12-01 PROCEDURE — 82075 ASSAY OF BREATH ETHANOL: CPT | Mod: S$GLB,,, | Performed by: PHYSICIAN ASSISTANT

## 2023-12-01 PROCEDURE — 80305 DRUG TEST PRSMV DIR OPT OBS: CPT | Mod: S$GLB,,, | Performed by: PHYSICIAN ASSISTANT

## 2023-12-01 PROCEDURE — 82075 POCT ALCOHOL BREATH TEST: ICD-10-PCS | Mod: S$GLB,,, | Performed by: PHYSICIAN ASSISTANT

## 2023-12-01 PROCEDURE — 80305 POCT RAPID DRUG SCREEN 10 PANEL: ICD-10-PCS | Mod: S$GLB,,, | Performed by: PHYSICIAN ASSISTANT

## 2023-12-01 NOTE — PROGRESS NOTES
Subjective:      Patient ID: Matt Lomax is a 57 y.o. female.    Chief Complaint: Drug / Alcohol Assessment    Pt came in today to di a drug/ breath alcohol test.     Drug / Alcohol Assessment  Pertinent negatives include no agitation, confusion, delusions, hallucinations, intoxication, loss of consciousness, seizures, self-injury, somnolence, violence or weakness. This is a new problem. The current episode started today. The problem is unchanged. Pertinent negatives include no bladder incontinence, bowel incontinence, fever, injury, nausea or vomiting. Past treatments include nothing. The treatment provided no relief. There is no history of addiction treatment, a chronic illness, a mental illness, a recent illness, a recent infection or a withdrawal syndrome.     Constitution: Negative for fever.   Gastrointestinal:  Negative for nausea, vomiting and bowel incontinence.   Genitourinary:  Negative for bladder incontinence.   Neurological:  Negative for loss of consciousness and seizures.   Psychiatric/Behavioral:  Negative for confusion, agitation, hallucinations, self-injury and history of withdrawal/DT's.    Objective:     Physical Exam   Assessment:      1. Encounter for drug screening      Plan:                 No follow-ups on file.

## 2024-01-11 ENCOUNTER — OFFICE VISIT (OUTPATIENT)
Dept: URGENT CARE | Facility: CLINIC | Age: 59
End: 2024-01-11
Payer: OTHER MISCELLANEOUS

## 2024-01-11 VITALS
HEART RATE: 87 BPM | OXYGEN SATURATION: 97 % | RESPIRATION RATE: 20 BRPM | TEMPERATURE: 98 F | SYSTOLIC BLOOD PRESSURE: 157 MMHG | BODY MASS INDEX: 40.82 KG/M2 | DIASTOLIC BLOOD PRESSURE: 74 MMHG | HEIGHT: 60 IN

## 2024-01-11 DIAGNOSIS — E11.9 TYPE 2 DIABETES MELLITUS WITHOUT COMPLICATION, WITHOUT LONG-TERM CURRENT USE OF INSULIN: ICD-10-CM

## 2024-01-11 DIAGNOSIS — Z02.6 ENCOUNTER RELATED TO WORKER'S COMPENSATION CLAIM: ICD-10-CM
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
## 2024-01-11 DIAGNOSIS — E78.5 HYPERLIPIDEMIA, UNSPECIFIED HYPERLIPIDEMIA TYPE: ICD-10-CM

## 2024-01-11 DIAGNOSIS — S30.0XXA CONTUSION OF BUTTOCK, INITIAL ENCOUNTER: ICD-10-CM

## 2024-01-11 DIAGNOSIS — E55.9 VITAMIN D DEFICIENCY: ICD-10-CM

## 2024-01-11 DIAGNOSIS — D35.2 PITUITARY ADENOMA: ICD-10-CM

## 2024-01-11 DIAGNOSIS — S30.0XXA CONTUSION OF LOWER BACK, INITIAL ENCOUNTER: Primary | ICD-10-CM

## 2024-01-11 LAB
CTP QC/QA: YES
POC 10 PANEL DRUG SCREEN: NEGATIVE

## 2024-01-11 PROCEDURE — 72100 X-RAY EXAM L-S SPINE 2/3 VWS: CPT | Mod: S$GLB,,, | Performed by: RADIOLOGY

## 2024-01-11 PROCEDURE — 99213 OFFICE O/P EST LOW 20 MIN: CPT | Mod: S$GLB,,, | Performed by: PHYSICIAN ASSISTANT

## 2024-01-11 PROCEDURE — 80305 DRUG TEST PRSMV DIR OPT OBS: CPT | Mod: QW,S$GLB,, | Performed by: PHYSICIAN ASSISTANT

## 2024-01-11 PROCEDURE — 72220 X-RAY EXAM SACRUM TAILBONE: CPT | Mod: S$GLB,,, | Performed by: RADIOLOGY

## 2024-01-11 NOTE — LETTER
[facility]  [address], [address]  [place] [address]  Phone: [phone]  Fax: [phone]  [facility]: [id number]    Pt Name: Matt Lomax  Injury Date: 01/10/2024   Employee ID: [id number] Date of First Treatment: 01/11/2024   Company: [employer]      Appointment Time: 09:15 AM Arrived: 0929   Provider: Claudia Kern PA-C Time Out:1016     Office Treatment:   1. Contusion of lower back, initial encounter    2. Fall, initial encounter    3. Encounter related to worker's compensation claim    4. Work related injury    5. Contusion of buttock, initial encounter          Patient Instructions: Attention not to aggravate affected area, Apply ice 24-48 hours then apply heat/warm soaks    Restrictions:  (Follow-up with the Occupational Health Department in 1-2 business days)

## 2024-01-11 NOTE — PROGRESS NOTES
Subjective:      Patient ID: Matt oLmax is a 58 y.o. female.    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 Unsure if she hit her head or not but denies LOC. Has been taking Tylenol and Motrin for the pain and inflammation. Denies numbness or tingling in the extremities.         Fall  Incident onset: 3 days ago. The fall occurred while standing. She fell from a height of 3 to 5 ft. She landed on Hard floor. There was no blood loss. The point of impact was the buttocks. The pain is present in the back and buttocks. The pain is at a severity of 5/10. The pain is moderate. The symptoms are aggravated by movement. Pertinent negatives include no abdominal pain, fever, numbness or vomiting. She has tried acetaminophen and NSAID for the symptoms.       Constitution: Negative for fever.   Neck: Negative for neck pain.   Cardiovascular:  Negative for chest pain, palpitations and sob on exertion.   Respiratory:  Negative for chest tightness and wheezing.    Gastrointestinal:  Negative for abdominal pain, vomiting and diarrhea.   Musculoskeletal:  Positive for pain and trauma.   Skin:  Negative for color change, wound, erythema and bruising.    Neurological:  Negative for numbness and tingling.     Objective:     Physical Exam  Vitals and nursing note reviewed.   Constitutional:       General: She is not in acute distress.     Appearance: She is not ill-appearing or toxic-appearing.   HENT:      Right Ear: External ear normal.      Left Ear: External ear normal.   Eyes:      Conjunctiva/sclera: Conjunctivae normal.   Cardiovascular:      Rate and Rhythm: Normal rate.      Pulses: Normal pulses.   Pulmonary:      Effort: Pulmonary effort is normal. No respiratory distress.      Breath sounds: No stridor.   Musculoskeletal:         General: Tenderness present.      Cervical back: Normal range of motion and neck supple. No tenderness.      Comments: FROM noted to the back and all extremities. There is no midline vertebral tenderness noted. Mild tenderness over the bilateral paraspinous lumbo sacral muscles. No coccygeal tenderness. Strong and equal distal pulses. Good strength in the BLEs. Distal soft touch sensation is intact and equal bilaterally.    Skin:     Capillary Refill: Capillary refill takes less than 2 seconds.      Findings: No bruising or erythema.   Neurological:      General: No focal deficit present.      Sensory: No sensory deficit.      Motor: No weakness.        Assessment:      1. Contusion of lower back, initial encounter    2. Fall, initial encounter    3. Encounter related to worker's compensation claim    4. Work related injury    5. Contusion of buttock, initial encounter    6. Type 2 diabetes mellitus without complication, without long-term current use of insulin    7. Hyperlipidemia, unspecified hyperlipidemia type    8. Pituitary adenoma    9. 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 On exam, she is afebrile and nontoxic appearing. She is NV intact. Good strength in all extremities, strong and equal distal  pulses. Her plain films have been reviewed by me- I do not appreciate any fractures at this time. I have advised close follow-up with [facility] in 1-2 business days and ED precautions discussed. Continue OTC NSAIDs. She verbalized understanding and agreed with plan.     XR LUMBAR SPINE 2 OR 3 VIEWS  Result Date: 1/11/2024  EXAMINATION: XR LUMBAR SPINE 2 OR 3 VIEWS CLINICAL HISTORY: Unspecified fall, initial encounter TECHNIQUE: AP lateral lumbar spine COMPARISON: May 2020 FINDINGS: Bones are slightly demineralized.  Grade 1 anterolisthesis L4 on L5 though increased compared to prior.  No compression fractures.  Vascular calcifications noted.  Facet arthropathy.   Degenerative change.  Grade 1 anterolisthesis L4 on L5 though increased compared to prior. Electronically signed by: Austyn Weiss MD Date: 01/11/2024 Time: 10:10    XR SACRUM AND COCCYX  Result Date: 1/11/2024  EXAMINATION: XR SACRUM AND COCCYX CLINICAL HISTORY: Unspecified fall, initial encounter TECHNIQUE: Two or three views of the sacrum and coccyx were performed. COMPARISON: None FINDINGS: Bones are fairly well mineralized.  SI joints are fairly well maintained.  No fracture or bone destruction seen.   No acute abnormality identified. Electronically signed by: Austyn Weiss MD Date: 01/11/2024 Time: 10:07        Patient Instructions: Attention not to aggravate affected area, Apply ice 24-48 hours then apply heat/warm soaks   Restrictions:  (Follow-up with the Occupational Health Department in 1-2 business days)  No follow-ups on file.  Patient Instructions   Continue to alternate Tylenol and Motrin as needed for pain and inflammation. Take according to package instructions. Alternate ice and heat. Follow-up with the occupational health department in 1-2 business days.     You must understand that you've received an Urgent Care treatment only and that you may be released before all your medical problems are known or treated. You, the patient, will  arrange for follow up care as instructed.      Follow up with your PCP or specialty clinic as instructed in the next 2-3 days if not improved or as needed. You can call [phone] to schedule an appointment with appropriate provider.      If you condition worsens, we recommend that you receive another evaluation at the emergency room immediately or contact your primary medical clinic's after hours call service to discuss your concerns.      Please return here or go to the Emergency Department for any concerns or worsening condition.      If you were prescribed a narcotic or controlled substance, do not drive or operate heavy equipment or machinery while taking these medications.

## 2024-01-11 NOTE — PATIENT INSTRUCTIONS
Continue to alternate Tylenol and Motrin as needed for pain and inflammation. Take according to package instructions. Alternate ice and heat. Follow-up with the occupational health department in 1-2 business days.     You must understand that you've received an Urgent Care treatment only and that you may be released before all your medical problems are known or treated. You, the patient, will arrange for follow up care as instructed.      Follow up with your PCP or specialty clinic as instructed in the next 2-3 days if not improved or as needed. You can call [phone] to schedule an appointment with appropriate provider.      If you condition worsens, we recommend that you receive another evaluation at the emergency room immediately or contact your primary medical clinic's after hours call service to discuss your concerns.      Please return here or go to the Emergency Department for any concerns or worsening condition.      If you were prescribed a narcotic or controlled substance, do not drive or operate heavy equipment or machinery while taking these medications.

## 2024-01-16 ENCOUNTER — TELEPHONE (OUTPATIENT)
Dept: URGENT CARE | Facility: CLINIC | Age: 59
End: 2024-01-16
Payer: COMMERCIAL

## 2024-01-17 ENCOUNTER — LAB VISIT (OUTPATIENT)
Dept: LAB | Facility: HOSPITAL | Age: 59
End: 2024-01-17
Attending: INTERNAL MEDICINE
Payer: COMMERCIAL

## 2024-01-17 ENCOUNTER — OFFICE VISIT (OUTPATIENT)
Dept: PRIMARY CARE CLINIC | Facility: CLINIC | Age: 59
End: 2024-01-17
Payer: COMMERCIAL

## 2024-01-17 VITALS
HEIGHT: 60 IN | WEIGHT: 213.19 LBS | SYSTOLIC BLOOD PRESSURE: 138 MMHG | HEART RATE: 56 BPM | BODY MASS INDEX: 41.85 KG/M2 | DIASTOLIC BLOOD PRESSURE: 78 MMHG | OXYGEN SATURATION: 99 %

## 2024-01-17 DIAGNOSIS — G56.91 NEUROPATHY OF HAND, RIGHT: ICD-10-CM

## 2024-01-17 DIAGNOSIS — E78.5 HYPERLIPIDEMIA, UNSPECIFIED HYPERLIPIDEMIA TYPE: ICD-10-CM

## 2024-01-17 DIAGNOSIS — L30.1 DYSHIDROTIC ECZEMA: ICD-10-CM

## 2024-01-17 DIAGNOSIS — I10 HYPERTENSION, UNSPECIFIED TYPE: ICD-10-CM

## 2024-01-17 DIAGNOSIS — Z23 NEEDS FLU SHOT: ICD-10-CM

## 2024-01-17 DIAGNOSIS — E11.9 TYPE 2 DIABETES MELLITUS WITHOUT COMPLICATION, WITHOUT LONG-TERM CURRENT USE OF INSULIN: ICD-10-CM

## 2024-01-17 DIAGNOSIS — E55.9 VITAMIN D DEFICIENCY: ICD-10-CM

## 2024-01-17 DIAGNOSIS — E03.8 SECONDARY HYPOTHYROIDISM: ICD-10-CM

## 2024-01-17 DIAGNOSIS — E11.9 TYPE 2 DIABETES MELLITUS WITHOUT COMPLICATION, WITHOUT LONG-TERM CURRENT USE OF INSULIN: Primary | ICD-10-CM

## 2024-01-17 DIAGNOSIS — G47.00 INSOMNIA, UNSPECIFIED TYPE: ICD-10-CM

## 2024-01-17 DIAGNOSIS — Z12.31 ENCOUNTER FOR SCREENING MAMMOGRAM FOR MALIGNANT NEOPLASM OF BREAST: ICD-10-CM

## 2024-01-17 DIAGNOSIS — D35.2 PITUITARY ADENOMA: ICD-10-CM

## 2024-01-17 LAB
ESTIMATED AVG GLUCOSE: 111 MG/DL (ref 68–131)
HBA1C MFR BLD: 5.5 % (ref 4–5.6)
T4 FREE SERPL-MCNC: 1.02 NG/DL (ref 0.71–1.51)

## 2024-01-17 PROCEDURE — 3008F BODY MASS INDEX DOCD: CPT | Mod: CPTII,S$GLB,, | Performed by: INTERNAL MEDICINE

## 2024-01-17 PROCEDURE — 99214 OFFICE O/P EST MOD 30 MIN: CPT | Mod: 25,S$GLB,, | Performed by: INTERNAL MEDICINE

## 2024-01-17 PROCEDURE — 90471 IMMUNIZATION ADMIN: CPT | Mod: S$GLB,,, | Performed by: INTERNAL MEDICINE

## 2024-01-17 PROCEDURE — 1159F MED LIST DOCD IN RCRD: CPT | Mod: CPTII,S$GLB,, | Performed by: INTERNAL MEDICINE

## 2024-01-17 PROCEDURE — 99999 PR PBB SHADOW E&M-EST. PATIENT-LVL III: CPT | Mod: PBBFAC,,, | Performed by: INTERNAL MEDICINE

## 2024-01-17 PROCEDURE — 90686 IIV4 VACC NO PRSV 0.5 ML IM: CPT | Mod: S$GLB,,, | Performed by: INTERNAL MEDICINE

## 2024-01-17 PROCEDURE — 84439 ASSAY OF FREE THYROXINE: CPT | Performed by: INTERNAL MEDICINE

## 2024-01-17 PROCEDURE — 3078F DIAST BP <80 MM HG: CPT | Mod: CPTII,S$GLB,, | Performed by: INTERNAL MEDICINE

## 2024-01-17 PROCEDURE — 36415 COLL VENOUS BLD VENIPUNCTURE: CPT | Performed by: INTERNAL MEDICINE

## 2024-01-17 PROCEDURE — 83036 HEMOGLOBIN GLYCOSYLATED A1C: CPT | Performed by: INTERNAL MEDICINE

## 2024-01-17 PROCEDURE — 3075F SYST BP GE 130 - 139MM HG: CPT | Mod: CPTII,S$GLB,, | Performed by: INTERNAL MEDICINE

## 2024-01-17 RX ORDER — MELOXICAM 7.5 MG/1
7.5 TABLET ORAL DAILY
Qty: 10 TABLET | Refills: 1 | Status: SHIPPED | OUTPATIENT
Start: 2024-01-17 | End: 2024-05-06

## 2024-01-17 RX ORDER — ZOLPIDEM TARTRATE 5 MG/1
5 TABLET ORAL NIGHTLY PRN
Qty: 30 TABLET | Refills: 2 | Status: SHIPPED | OUTPATIENT
Start: 2024-01-17

## 2024-01-17 RX ORDER — TRIAMCINOLONE ACETONIDE 1 MG/G
OINTMENT TOPICAL 2 TIMES DAILY
Qty: 30 G | Refills: 1 | Status: SHIPPED | OUTPATIENT
Start: 2024-01-17

## 2024-01-17 NOTE — PROGRESS NOTES
[facility] Note    Chief Complaint      Chief Complaint   Patient presents with    Follow-up     6 mth     History of Present Illness      Matt Lomax is a 58 y.o. female who presents today for chief complaint follow up chronic conditions .     HPI   Last seen 7.23 annual   No recent lipids and cbc  7.23  A1c cmp t4 vit d last checked 5.23    She reports new onset right 5th digit neuropathy   Alsodishidrotic eczema of both heand with cracks in skin       DM2 last a1c 5.7, due now, diet controlled - post pituitary sx, eye exam Dr Bender in the past. She had DKA post op   HTN Prescribed triamterene-hydrochlorothiazide 37.5-25 mg by mouth daily and verapamil 240 mg by mouth daily. At goal today, she does not check at home  HLD Prescribed pravastatin 20 mg   Lab Results   Component Value Date    LDLCALC 90.4 07/18/2023        Pan hypopitutitarism since pit adneiomna r/s with Dr Willis . Génesis, AI, DM. Sees Dai Qiu   Pit Adenoma/prolactinima  she sees Dr Quigley in Endo: , possible prolactinoma, ~ in 2006 and started on cabergoline at that time due to elevated prolactin, was Monitored with MRI with increase in size over time so she was referred to  and she had surgery in 7/2014.  Prolactin has been normal since time of surgery off cabergoline.  Lat MRI 3.21. She had post op CSF leak      HoTH on levothyroxine 75mcg last T4 euthyroid   Adrenal insufficiency: she sees Dr Quigley in Endo:Takes hydrocortisone 10 mg by mouth with breakfast and half a tablet with dinner, has not discussed stress dose   Obesity BMI 42 with comorbid HTN DM   JUAN and insomnia on ambien 5 qhs, she uses CPAP qhs, she feels better with use, may need new mask      HM   Mmg 7.23  Cscope scheduled 2.2.24  Pap 11.22 Melly utалександр     Active Problem List with Overview Notes    Diagnosis Date Noted    Type 2 diabetes mellitus without complication, without long-term current use of insulin 07/31/2020     At goal no  change continue diet control       Insomnia 2019    Hyperlipidemia 2018    Secondary hypothyroidism 2015    H/O secondary hypogonadism 2015    Secondary adrenal insufficiency 10/21/2014    Pituitary adenoma 2014    JUAN on CPAP 2014    Obesity, morbid, BMI 40.0-49.9 2014    Hypertension 2013       Health Maintenance   Topic Date Due    Shingles Vaccine (1 of 2) Never done    Colorectal Cancer Screening  2022    Hemoglobin A1c  2023    Mammogram  2024    Lipid Panel  10/10/2024    Low Dose Statin  2025    TETANUS VACCINE  2026    Hepatitis C Screening  Completed    Foot Exam  Discontinued    Eye Exam  Discontinued       Past Medical History:   Diagnosis Date    HTN (hypertension)     Hyperprolactinemia     Morbid obesity     JUAN (obstructive sleep apnea)     Pituitary adenoma     Secondary hypothyroidism 2015    Type II or unspecified type diabetes mellitus without mention of complication, uncontrolled     Vitamin D deficiency disease        Past Surgical History:   Procedure Laterality Date    Breast Reduction       SECTION      x 3    COLONOSCOPY N/A 2017    Procedure: COLONOSCOPY;  Surgeon: Ciro Rebolledo MD;  Location: [facility] ([address] [place] [address]);  Service: Endoscopy;  Laterality: N/A;    TOTAL REDUCTION MAMMOPLASTY Bilateral     TRANSPHENOIDAL PITUITARY RESECTION  14    TUBAL LIGATION         family history includes Diabetes in her maternal aunt and mother; Heart disease in her mother; Hypertension in her father and mother; No Known Problems in her brother, brother, daughter, daughter, maternal grandfather, maternal grandmother, maternal uncle, paternal aunt, paternal grandfather, paternal grandmother, paternal uncle, sister, and son.    Social History     Tobacco Use    Smoking status: Never     Passive exposure: Never    Smokeless tobacco: Never   Substance Use Topics    Alcohol use: No    Drug use: No        Review of Systems   Constitutional:  Negative for chills, fever, malaise/fatigue and weight loss.   Respiratory:  Negative for cough, sputum production, shortness of breath and wheezing.    Cardiovascular:  Negative for chest pain, palpitations, orthopnea and leg swelling.   Gastrointestinal:  Negative for constipation, diarrhea, nausea and vomiting.   Genitourinary:  Negative for dysuria, frequency, hematuria and urgency.        Outpatient Encounter Medications as of 1/17/2024   Medication Sig Note Dispense Refill    hydrocortisone (CORTEF) 10 MG Tab TAKE 1 TABLET BY MOUTH ONCE DAILY WITH BREAKFAST AND  ONE-HALF  TABLET  ONCE  DAILY  WITH  DINNER  45 tablet 11    ibuprofen (ADVIL,MOTRIN) 800 MG tablet Take 1 tablet (800 mg total) by mouth every 8 (eight) hours as needed for Pain (with food).  30 tablet 1    levothyroxine (SYNTHROID) 75 MCG tablet TAKE 1 TABLET BY MOUTH BEFORE BREAKFAST  90 tablet 3    multivitamin (THERAGRAN) per tablet Take 1 tablet by mouth every morning.  7/14/2014: Hold the morning of surgery.       pravastatin (PRAVACHOL) 20 MG tablet Take 1 tablet (20 mg total) by mouth once daily. For Cholesterol.  90 tablet 3    triamterene-hydrochlorothiazide 37.5-25 mg (DYAZIDE) 37.5-25 mg per capsule Take 1 capsule by mouth once daily.  90 capsule 3    verapamiL (CALAN-SR) 240 MG CR tablet TAKE 1 TABLET (240 MG TOTAL) BY MOUTH ONCE DAILY.  90 tablet 3    meloxicam (MOBIC) 7.5 MG tablet Take 1 tablet (7.5 mg total) by mouth once daily.  10 tablet 1    triamcinolone acetonide 0.1% (KENALOG) 0.1 % ointment Apply topically 2 (two) times daily.  30 g 1    zolpidem (AMBIEN) 5 MG Tab Take 1 tablet (5 mg total) by mouth nightly as needed (Insomnia.).  30 tablet 2    [DISCONTINUED] zolpidem (AMBIEN) 5 MG Tab Take 1 tablet (5 mg total) by mouth nightly as needed (Insomnia.). (Patient not taking: Reported on 1/11/2024)  30 tablet 2     No facility-administered encounter medications on file as of 1/17/2024.     "    Review of patient's allergies indicates:   Allergen Reactions    Lisinopril Hives           Physical Exam      Vital Signs  Pulse: (!) 56  SpO2: 99 %  BP: 138/78  BP Location: Right arm  Patient Position: Sitting  Height and Weight  Height: 5' (152.4 cm)  Weight: 96.7 kg (213 lb 3 oz)  BSA (Calculated - sq m): 2.02 sq meters  BMI (Calculated): 41.6  Weight in (lb) to have BMI = 25: 127.7]    Physical Exam  Vitals reviewed.   Constitutional:       General: She is not in acute distress.     Appearance: She is well-developed. She is not diaphoretic.   HENT:      Head: Normocephalic and atraumatic.      Right Ear: External ear normal.      Left Ear: External ear normal.      Nose: Nose normal.   Eyes:      General:         Right eye: No discharge.         Left eye: No discharge.      Conjunctiva/sclera: Conjunctivae normal.   Cardiovascular:      Rate and Rhythm: Normal rate and regular rhythm.      Heart sounds: Normal heart sounds.   Pulmonary:      Effort: Pulmonary effort is normal. No respiratory distress.      Breath sounds: Normal breath sounds.   Musculoskeletal:         General: Normal range of motion.      Cervical back: Normal range of motion.   Skin:     Coloration: Skin is not pale.      Findings: No rash.   Neurological:      Mental Status: She is alert and oriented to person, place, and time.   Psychiatric:         Behavior: Behavior normal.         Thought Content: Thought content normal.          Laboratory:  CBC:  No results for input(s): "WBC", "RBC", "HGB", "HCT", "PLT", "MCV", "MCH", "MCHC" in the last 2160 hours.  CMP:  No results for input(s): "GLU", "CALCIUM", "ALBUMIN", "PROT", "NA", "K", "CO2", "CL", "BUN", "ALKPHOS", "ALT", "AST", "BILITOT" in the last 2160 hours.    Invalid input(s): "CREATININ"  URINALYSIS:  Recent Labs   Lab Result Units 11/20/23  1439   pH, UA  5.5      LIPIDS:  No results for input(s): "TSH", "HDL", "CHOL", "TRIG", "LDLCALC", "CHOLHDL", "NONHDLCHOL", "TOTALCHOLEST" in " "the last 2160 hours.  TSH:  No results for input(s): "TSH" in the last 2160 hours.  A1C:  No results for input(s): "HGBA1C" in the last 2160 hours.        Assessment/Plan     Matt Lomax is a 58 y.o.female with:    1. Type 2 diabetes mellitus without complication, without long-term current use of insulin  Overview:  At goal no change continue diet control     Orders:  -     Hemoglobin A1C; Future; Expected date: 01/17/2024  -     Hemoglobin A1C; Future; Expected date: 06/17/2024  -     Microalbumin/creatinine urine ratio; Future; Expected date: 06/17/2024    2. Vitamin D deficiency  -     Vitamin D 25 hydroxy; Future; Expected date: 06/17/2024    3. Secondary hypothyroidism  Assessment & Plan:  T4 today     Orders:  -     T4, FREE; Future; Expected date: 01/17/2024  -     T4, FREE; Future; Expected date: 06/17/2024    4. Hyperlipidemia, unspecified hyperlipidemia type  Assessment & Plan:  Ct statin due for labs at annual     Orders:  -     Lipid Panel; Future; Expected date: 06/17/2024    5. Hypertension, unspecified type  Assessment & Plan:  At goal no change     Orders:  -     CBC Without Differential; Future; Expected date: 06/17/2024  -     Comprehensive Metabolic Panel; Future; Expected date: 06/17/2024    6. Encounter for screening mammogram for malignant neoplasm of breast  -     Mammo Digital Screening Bilat w/ Pato; Future; Expected date: 07/19/2024    7. Insomnia, unspecified type  Assessment & Plan:  Opk for ambien refill     Orders:  -     zolpidem (AMBIEN) 5 MG Tab; Take 1 tablet (5 mg total) by mouth nightly as needed (Insomnia.).  Dispense: 30 tablet; Refill: 2    8. Neuropathy of hand, right  -     meloxicam (MOBIC) 7.5 MG tablet; Take 1 tablet (7.5 mg total) by mouth once daily.  Dispense: 10 tablet; Refill: 1    9. Dyshidrotic eczema  -     triamcinolone acetonide 0.1% (KENALOG) 0.1 % ointment; Apply topically 2 (two) times daily.  Dispense: 30 g; Refill: 1    10. Needs flu shot  -   "   Influenza - Quadrivalent *Preferred* (6 months+) (PF)    11. Pituitary adenoma  Assessment & Plan:  Message sent to endo to schedule follow up           Use of the [employer] Patient Portal discussed and encouraged during today's visit  -Continue current medications and maintain follow up with specialists.  Return to clinic in  6 months annual .  Future Appointments   Date Time Provider Department Center   1/18/2024 10:30 AM [facility]   7/16/2024  8:00 AM LAB, [facility]   7/18/2024  8:20 AM Eloise Schneider MD [facility]   7/18/2024 11:10 AM [facility]  MAMMO1 [facility]       Eloise Schneider MD  1/17/2024 9:27 AM    Primary Care Internal Medicine

## 2024-01-18 ENCOUNTER — OFFICE VISIT (OUTPATIENT)
Dept: URGENT CARE | Facility: CLINIC | Age: 59
End: 2024-01-18
Payer: COMMERCIAL

## 2024-01-18 VITALS
HEART RATE: 57 BPM | OXYGEN SATURATION: 98 % | TEMPERATURE: 99 F | WEIGHT: 213 LBS | HEIGHT: 60 IN | BODY MASS INDEX: 41.82 KG/M2 | DIASTOLIC BLOOD PRESSURE: 67 MMHG | SYSTOLIC BLOOD PRESSURE: 123 MMHG
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
## 2024-01-18 DIAGNOSIS — S39.012D STRAIN OF LUMBAR REGION, SUBSEQUENT ENCOUNTER: Primary | ICD-10-CM

## 2024-01-18 DIAGNOSIS — Z02.6 ENCOUNTER RELATED TO WORKER'S COMPENSATION CLAIM: ICD-10-CM

## 2024-01-18 PROCEDURE — 99214 OFFICE O/P EST MOD 30 MIN: CPT | Mod: S$GLB,,, | Performed by: STUDENT IN AN ORGANIZED HEALTH CARE EDUCATION/TRAINING PROGRAM

## 2024-01-18 NOTE — LETTER
[facility] - Occupational Health  [address] [address]  Phone: [phone]  Fax: [phone]  [facility]: [email or web address]    Pt Name: Matt Lomax  Injury Date: 01/10/2024   Employee ID: [id number] Date of First Treatment: 01/18/2024   Company: [employer]      Appointment Time: 10:30 AM Arrived: 10:10 AM    Provider: Halina Bray MD Time Out:11:27 AM      Office Treatment:   1. Strain of lumbar region, subsequent encounter    2. Encounter related to worker's compensation claim    3. Fall, subsequent encounter          Patient Instructions: Attention not to aggravate affected area      Restrictions: Regular Duty, Discharged from Occupational Health     Return As needed. KEENAN
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
## 2024-01-26 ENCOUNTER — TELEPHONE (OUTPATIENT)
Dept: ENDOSCOPY | Facility: HOSPITAL | Age: 59
End: 2024-01-26
Payer: COMMERCIAL

## 2024-01-26 NOTE — TELEPHONE ENCOUNTER
Left voicemail and sent portal message for patient to call Endoscopy Scheduling to review instructions and confirm appointment for Colonoscopy on 2/2/24.

## 2024-01-27 ENCOUNTER — OFFICE VISIT (OUTPATIENT)
Dept: URGENT CARE | Facility: CLINIC | Age: 59
End: 2024-01-27
Payer: COMMERCIAL

## 2024-01-27 VITALS
OXYGEN SATURATION: 100 % | WEIGHT: 213 LBS | TEMPERATURE: 103 F | DIASTOLIC BLOOD PRESSURE: 80 MMHG | RESPIRATION RATE: 16 BRPM | HEIGHT: 60 IN | BODY MASS INDEX: 41.82 KG/M2 | SYSTOLIC BLOOD PRESSURE: 130 MMHG | HEART RATE: 81 BPM

## 2024-01-27 DIAGNOSIS — R50.9 FEVER, UNSPECIFIED FEVER CAUSE: Primary | ICD-10-CM

## 2024-01-27 LAB
CTP QC/QA: YES
CTP QC/QA: YES
FLUAV AG NPH QL: NEGATIVE
FLUBV AG NPH QL: NEGATIVE
SARS-COV-2 AG RESP QL IA.RAPID: NEGATIVE

## 2024-01-27 PROCEDURE — 99213 OFFICE O/P EST LOW 20 MIN: CPT | Mod: 25,S$GLB,, | Performed by: FAMILY MEDICINE

## 2024-01-27 PROCEDURE — 87811 SARS-COV-2 COVID19 W/OPTIC: CPT | Mod: QW,S$GLB,, | Performed by: FAMILY MEDICINE

## 2024-01-27 PROCEDURE — 87804 INFLUENZA ASSAY W/OPTIC: CPT | Mod: 59,QW,S$GLB, | Performed by: FAMILY MEDICINE

## 2024-01-27 RX ORDER — ALUMINUM ZIRCONIUM OCTACHLOROHYDREX GLY 16 G/100G
4 GEL TOPICAL DAILY
Qty: 30 CAPSULE | Refills: 0 | Status: SHIPPED | OUTPATIENT
Start: 2024-01-27 | End: 2024-05-06

## 2024-01-27 RX ORDER — ACETAMINOPHEN 500 MG
1000 TABLET ORAL
Status: COMPLETED | OUTPATIENT
Start: 2024-01-27 | End: 2024-01-27

## 2024-01-27 RX ORDER — OSELTAMIVIR PHOSPHATE 75 MG/1
75 CAPSULE ORAL 2 TIMES DAILY
Qty: 10 CAPSULE | Refills: 0 | Status: SHIPPED | OUTPATIENT
Start: 2024-01-27 | End: 2024-02-01

## 2024-01-27 RX ORDER — ONDANSETRON 4 MG/1
4 TABLET, ORALLY DISINTEGRATING ORAL EVERY 6 HOURS PRN
Qty: 30 TABLET | Refills: 0 | Status: SHIPPED | OUTPATIENT
Start: 2024-01-27 | End: 2024-05-06

## 2024-01-27 RX ORDER — ACETAMINOPHEN 500 MG
1000 TABLET ORAL EVERY 8 HOURS PRN
Qty: 30 TABLET | Refills: 0 | Status: SHIPPED | OUTPATIENT
Start: 2024-01-27 | End: 2024-05-06

## 2024-01-27 RX ADMIN — Medication 1000 MG: at 01:01

## 2024-01-27 NOTE — PROGRESS NOTES
Subjective:      Patient ID: Matt Lomax is a 58 y.o. female.    Vitals:  height is 5' (1.524 m) and weight is 96.6 kg (213 lb). Her oral temperature is 102.5 °F (39.2 °C) (abnormal). Her blood pressure is 130/80 and her pulse is 81. Her respiration is 16 and oxygen saturation is 100%.     Chief Complaint: Nausea    This is a 58 y.o. female who presents today with 3 days of fever, chills, body aches, dry coughing, no appetite, diarrhea. She has vomited after brushing her teeth today. She has been drinking power aid and water and soups which are staying down. She is having about 3-4 loose bowel movements daily. She denies any blood or black tarry stools. She denies sinus pressure, sore throat, ear pain, wheezing, chest pain, sob, abdominal pain. She has tried ibuprofen once and not sure if it helped. She denies any alcohol intake. She did not eat our prior to onset.     Nausea  The current episode started in the past 7 days. The problem occurs constantly. The problem has been unchanged. Associated symptoms include chills, coughing, nausea and vomiting. Pertinent negatives include no congestion, headaches or weakness. Nothing aggravates the symptoms. Treatments tried: IBU yesterday. The treatment provided no relief.       Constitution: Positive for chills.   HENT: Negative.  Negative for congestion.    Neck: neck negative.   Respiratory:  Positive for cough. Negative for shortness of breath and wheezing.    Gastrointestinal:  Positive for nausea and vomiting.   Genitourinary:  Negative for frequency and urgency.   Neurological:  Negative for headaches.      Objective:     Physical Exam   Constitutional: She is oriented to person, place, and time. No distress.   HENT:   Head: Normocephalic and atraumatic.   Ears:   Right Ear: Tympanic membrane, external ear and ear canal normal.   Left Ear: Tympanic membrane, external ear and ear canal normal.   Mouth/Throat: Oropharynx is clear and moist and mucous  membranes are normal. Mucous membranes are moist. No oropharyngeal exudate or posterior oropharyngeal erythema.   Eyes: Conjunctivae are normal. No scleral icterus.   Cardiovascular: Normal rate, regular rhythm, normal heart sounds and normal pulses.   Pulmonary/Chest: Effort normal and breath sounds normal. No stridor. No respiratory distress. She has no wheezes. She has no rhonchi.   Abdominal: Normal appearance.   Musculoskeletal: Normal range of motion.         General: Normal range of motion.   Neurological: She is alert and oriented to person, place, and time.   Skin: Skin is not diaphoretic.   Vitals reviewed.      Assessment:     1. Fever, unspecified fever cause        Plan:       Fever, unspecified fever cause  -patient to follow up if any worsening or no improvement of her symptoms, discussed supportive care and hydration   -     POCT Influenza A/B Rapid Antigen  -     SARS Coronavirus 2 Antigen, POCT Manual Read  -     XR CHEST PA AND LATERAL; Future; Expected date: 01/27/2024    Other orders  -     acetaminophen tablet 1,000 mg  -     Bifidobacterium infantis (ALIGN) 4 mg Cap; Take 1 capsule (4 mg total) by mouth once daily.  Dispense: 30 capsule; Refill: 0  -     ondansetron (ZOFRAN-ODT) 4 MG TbDL; Take 1 tablet (4 mg total) by mouth every 6 (six) hours as needed (nausea).  Dispense: 30 tablet; Refill: 0  -     acetaminophen (TYLENOL) 500 MG tablet; Take 2 tablets (1,000 mg total) by mouth every 8 (eight) hours as needed for Pain or Temperature greater than (100.4).  Dispense: 30 tablet; Refill: 0  -     oseltamivir (TAMIFLU) 75 MG capsule; Take 1 capsule (75 mg total) by mouth 2 (two) times daily. for 5 days  Dispense: 10 capsule; Refill: 0

## 2024-01-29 ENCOUNTER — OFFICE VISIT (OUTPATIENT)
Dept: URGENT CARE | Facility: CLINIC | Age: 59
End: 2024-01-29
Payer: COMMERCIAL

## 2024-01-29 ENCOUNTER — TELEPHONE (OUTPATIENT)
Dept: ENDOSCOPY | Facility: HOSPITAL | Age: 59
End: 2024-01-29
Payer: COMMERCIAL

## 2024-01-29 ENCOUNTER — TELEPHONE (OUTPATIENT)
Dept: PRIMARY CARE CLINIC | Facility: CLINIC | Age: 59
End: 2024-01-29
Payer: COMMERCIAL

## 2024-01-29 ENCOUNTER — PATIENT MESSAGE (OUTPATIENT)
Dept: PRIMARY CARE CLINIC | Facility: CLINIC | Age: 59
End: 2024-01-29
Payer: COMMERCIAL

## 2024-01-29 VITALS
HEART RATE: 56 BPM | OXYGEN SATURATION: 95 % | RESPIRATION RATE: 16 BRPM | SYSTOLIC BLOOD PRESSURE: 146 MMHG | WEIGHT: 213 LBS | BODY MASS INDEX: 41.6 KG/M2 | DIASTOLIC BLOOD PRESSURE: 86 MMHG | TEMPERATURE: 99 F

## 2024-01-29 VITALS — WEIGHT: 213 LBS | BODY MASS INDEX: 41.82 KG/M2 | HEIGHT: 60 IN

## 2024-01-29 DIAGNOSIS — J18.9 PNEUMONIA OF RIGHT MIDDLE LOBE DUE TO INFECTIOUS ORGANISM: Primary | ICD-10-CM

## 2024-01-29 PROCEDURE — 99213 OFFICE O/P EST LOW 20 MIN: CPT | Mod: S$GLB,,, | Performed by: FAMILY MEDICINE

## 2024-01-29 PROCEDURE — 87502 INFLUENZA DNA AMP PROBE: CPT | Mod: QW,S$GLB,, | Performed by: FAMILY MEDICINE

## 2024-01-29 PROCEDURE — 87811 SARS-COV-2 COVID19 W/OPTIC: CPT | Mod: QW,S$GLB,, | Performed by: FAMILY MEDICINE

## 2024-01-29 RX ORDER — AMOXICILLIN AND CLAVULANATE POTASSIUM 875; 125 MG/1; MG/1
1 TABLET, FILM COATED ORAL 2 TIMES DAILY
Qty: 20 TABLET | Refills: 0 | Status: SHIPPED | OUTPATIENT
Start: 2024-01-29 | End: 2024-02-08

## 2024-01-29 RX ORDER — BENZONATATE 100 MG/1
100 CAPSULE ORAL EVERY 6 HOURS PRN
Qty: 30 CAPSULE | Refills: 1 | Status: SHIPPED | OUTPATIENT
Start: 2024-01-29 | End: 2024-05-06

## 2024-01-29 RX ORDER — ALBUTEROL SULFATE 90 UG/1
2 AEROSOL, METERED RESPIRATORY (INHALATION) EVERY 6 HOURS PRN
Qty: 18 G | Refills: 0 | Status: SHIPPED | OUTPATIENT
Start: 2024-01-29 | End: 2024-05-06

## 2024-01-29 RX ORDER — AZITHROMYCIN 250 MG/1
TABLET, FILM COATED ORAL
Qty: 6 TABLET | Refills: 0 | Status: SHIPPED | OUTPATIENT
Start: 2024-01-29 | End: 2024-02-03

## 2024-01-29 NOTE — PROGRESS NOTES
Subjective:      Patient ID: Matt Lomax is a 58 y.o. female.    Vitals:  weight is 96.6 kg (213 lb). Her oral temperature is 98.7 °F (37.1 °C). Her blood pressure is 146/86 (abnormal) and her pulse is 56 (abnormal). Her respiration is 16 and oxygen saturation is 95%.     Chief Complaint: Wheezing    This is a 58 y.o. female who presents today with a chief complaint of productive cough, fever (highest temp = 102.0), wheezing (pt states) and headache x 5 days with gradually improving sx, but wheezing is worsening. Pt originally had nausea, vomiting, diarrhea. Pt has not had diarrhea since yesterday morning. No vomiting in past 24 hour. No ear px, ear congestion,  nasal congestion, sore throat or abd px     Home tx: immodium, Tamiflu (only 1 pill, pt lost Rx), children's albuterol inhaler (helped - belongs to grandchild)    PMH: seen in  on 1/27/2024 (had chest x-ray) and given Rx    Wheezing   This is a new problem. The current episode started in the past 7 days. The problem has been gradually worsening. Associated symptoms include coughing, diarrhea, a fever, headaches, shortness of breath, sputum production and vomiting. Pertinent negatives include no abdominal pain, chest pain, chills, ear pain, rhinorrhea or sore throat. There is no history of asthma, COPD, heart failure, past MI or pneumonia.       Constitution: Positive for fever. Negative for chills.   HENT:  Negative for ear pain and sore throat.    Cardiovascular:  Negative for chest pain.   Respiratory:  Positive for cough, sputum production, shortness of breath and wheezing.    Gastrointestinal:  Positive for vomiting and diarrhea. Negative for abdominal pain.   Neurological:  Positive for headaches.      Objective:     Physical Exam   Constitutional: She does not appear ill. No distress. obesity  HENT:   Head: Normocephalic and atraumatic.   Mouth/Throat: Mucous membranes are moist. Posterior oropharyngeal erythema present.   Eyes: Pupils  are equal, round, and reactive to light. Extraocular movement intact   Neck: Neck supple.   Cardiovascular: Normal rate, regular rhythm, normal heart sounds and normal pulses.   Pulmonary/Chest: Effort normal. She has wheezes.   Abdominal: Normal appearance.   Neurological: She is alert.   Nursing note and vitals reviewed.    Results for orders placed or performed in visit on 01/29/24   SARS Coronavirus 2 Antigen, POCT Manual Read   Result Value Ref Range    SARS Coronavirus 2 Antigen Negative Negative     Acceptable Yes    POCT Influenza A/B MOLECULAR   Result Value Ref Range    POC Molecular Influenza A Ag Negative Negative, Not Reported    POC Molecular Influenza B Ag Negative Negative, Not Reported     Acceptable Yes       Assessment:     1. Pneumonia of right middle lobe due to infectious organism        Plan:       Pneumonia of right middle lobe due to infectious organism  -     SARS Coronavirus 2 Antigen, POCT Manual Read  -     POCT Influenza A/B MOLECULAR  -     azithromycin (ZITHROMAX) 250 MG tablet; Take 2 tablets (500 mg) on  Day 1,  followed by 1 tablet (250 mg) once daily on Days 2 through 5.  Dispense: 6 tablet; Refill: 0  -     amoxicillin-clavulanate 875-125mg (AUGMENTIN) 875-125 mg per tablet; Take 1 tablet by mouth 2 (two) times daily. for 10 days  Dispense: 20 tablet; Refill: 0  -     albuterol (PROAIR HFA) 90 mcg/actuation inhaler; Inhale 2 puffs into the lungs every 6 (six) hours as needed for Wheezing. Rescue  Dispense: 18 g; Refill: 0  -     benzonatate (TESSALON PERLES) 100 MG capsule; Take 1 capsule (100 mg total) by mouth every 6 (six) hours as needed for Cough.  Dispense: 30 capsule; Refill: 1    ER precautions discussed.

## 2024-01-29 NOTE — PLAN OF CARE
Spoke to pt to reschedule procedure(s) Colonoscopy       Physician to perform procedure(s) Dr. ANUSHA Jackson  Date of Procedure (s) 5/7/24  Arrival Time 10:15 AM  Time of Procedure(s) 11:15 AM   Location of Procedure(s) [place] 2nd Floor  Type of Rx Prep sent to patient: PEG (pt has already)  Instructions provided to patient via [facility]    Patient was informed on the following information and verbalized understanding. Screening questionnaire reviewed with patient and complete. If procedure requires anesthesia, a responsible adult needs to be present to accompany the patient home, patient cannot drive after receiving anesthesia. Appointment details are tentative, especially check-in time. Patient will receive a prep-op call 7 days prior to confirm check-in time for procedure. If applicable the patient should contact their pharmacy to verify Rx for procedure prep is ready for pick-up. Patient was advised to call the scheduling department at [phone] if pharmacy states no Rx is available. Patient was advised to call the endoscopy scheduling department if any questions or concerns arise.      [facility] Scheduling Department

## 2024-01-29 NOTE — LETTER
January 29, 2024      [facility] [place]  [address]  [facility] [address]  Phone: [phone]  Fax: [phone]       Patient: Matt Lomax   YOB: 1965  Date of Visit: 01/29/2024    To Whom It May Concern:    Elroy Lomax  was at [facility] on 01/29/2024. The patient may return to work/school on 02/05/2024 with no restrictions. If you have any questions or concerns, or if I can be of further assistance, please do not hesitate to contact me.    Sincerely,                Arnulfo Glover MD

## 2024-01-29 NOTE — TELEPHONE ENCOUNTER
Spoke to pt to reschedule procedure(s) Colonoscopy       Physician to perform procedure(s) Dr. ANUSHA Jackson  Date of Procedure (s) 5/7/24  Arrival Time 10:15 AM  Time of Procedure(s) 11:15 AM   Location of Procedure(s) [place] 2nd Floor  Type of Rx Prep sent to patient: PEG (pt has already)  Instructions provided to patient via [facility]    Patient was informed on the following information and verbalized understanding. Screening questionnaire reviewed with patient and complete. If procedure requires anesthesia, a responsible adult needs to be present to accompany the patient home, patient cannot drive after receiving anesthesia. Appointment details are tentative, especially check-in time. Patient will receive a prep-op call 7 days prior to confirm check-in time for procedure. If applicable the patient should contact their pharmacy to verify Rx for procedure prep is ready for pick-up. Patient was advised to call the scheduling department at [phone] if pharmacy states no Rx is available. Patient was advised to call the endoscopy scheduling department if any questions or concerns arise.      [facility] Scheduling Department

## 2024-01-31 ENCOUNTER — TELEPHONE (OUTPATIENT)
Dept: ENDOCRINOLOGY | Facility: CLINIC | Age: 59
End: 2024-01-31
Payer: COMMERCIAL

## 2024-01-31 NOTE — TELEPHONE ENCOUNTER
----- Message from Kavitha Willingham MA sent at 1/30/2024  5:14 PM CST -----  Regarding: FW: Recall Annual Appt  Contact: [phone]    ----- Message -----  From: Jennifer Salas  Sent: 1/30/2024   9:17 AM CST  To: [facility] Staff  Subject: Recall Annual Appt                               Pt called in to schedule an appt; no available appts in Epic. Pt is asking for a call back soon to schedule. Thanks.     Reason appt not schedule: no appt available      Patient's DX: Annual f/u Recall in chart     Patient requesting call back or [facility] msg:   Call back, pt states she is available any time in May.

## 2024-02-17 DIAGNOSIS — E03.9 HYPOTHYROIDISM, UNSPECIFIED TYPE: ICD-10-CM

## 2024-02-19 RX ORDER — LEVOTHYROXINE SODIUM 75 UG/1
TABLET ORAL
Qty: 90 TABLET | Refills: 3 | Status: SHIPPED | OUTPATIENT
Start: 2024-02-19

## 2024-04-30 ENCOUNTER — TELEPHONE (OUTPATIENT)
Dept: ENDOSCOPY | Facility: HOSPITAL | Age: 59
End: 2024-04-30
Payer: COMMERCIAL

## 2024-04-30 NOTE — TELEPHONE ENCOUNTER
Left voicemail and sent portal message for patient to call Endoscopy Scheduling to review instructions and confirm appointment for Colonoscopy on 5/7/24.

## 2024-05-06 ENCOUNTER — PATIENT MESSAGE (OUTPATIENT)
Dept: PRIMARY CARE CLINIC | Facility: CLINIC | Age: 59
End: 2024-05-06
Payer: COMMERCIAL

## 2024-05-06 ENCOUNTER — ANESTHESIA EVENT (OUTPATIENT)
Dept: ENDOSCOPY | Facility: HOSPITAL | Age: 59
End: 2024-05-06
Payer: COMMERCIAL

## 2024-05-06 ENCOUNTER — OFFICE VISIT (OUTPATIENT)
Dept: ENDOCRINOLOGY | Facility: CLINIC | Age: 59
End: 2024-05-06
Payer: COMMERCIAL

## 2024-05-06 VITALS
HEART RATE: 64 BPM | WEIGHT: 206.13 LBS | DIASTOLIC BLOOD PRESSURE: 82 MMHG | SYSTOLIC BLOOD PRESSURE: 136 MMHG | BODY MASS INDEX: 40.47 KG/M2 | HEIGHT: 60 IN | OXYGEN SATURATION: 98 %

## 2024-05-06 DIAGNOSIS — Z86.39 H/O SECONDARY HYPOGONADISM: ICD-10-CM

## 2024-05-06 DIAGNOSIS — E66.01 OBESITY, MORBID, BMI 40.0-49.9: ICD-10-CM

## 2024-05-06 DIAGNOSIS — D35.2 PITUITARY ADENOMA: Primary | ICD-10-CM

## 2024-05-06 DIAGNOSIS — E22.1 HYPERPROLACTINEMIA: ICD-10-CM

## 2024-05-06 DIAGNOSIS — E27.49 SECONDARY ADRENAL INSUFFICIENCY: ICD-10-CM

## 2024-05-06 DIAGNOSIS — E03.8 SECONDARY HYPOTHYROIDISM: ICD-10-CM

## 2024-05-06 PROCEDURE — 99214 OFFICE O/P EST MOD 30 MIN: CPT | Mod: S$GLB,,, | Performed by: INTERNAL MEDICINE

## 2024-05-06 PROCEDURE — G2211 COMPLEX E/M VISIT ADD ON: HCPCS | Mod: S$GLB,,, | Performed by: INTERNAL MEDICINE

## 2024-05-06 PROCEDURE — 3044F HG A1C LEVEL LT 7.0%: CPT | Mod: CPTII,S$GLB,, | Performed by: INTERNAL MEDICINE

## 2024-05-06 PROCEDURE — 1159F MED LIST DOCD IN RCRD: CPT | Mod: CPTII,S$GLB,, | Performed by: INTERNAL MEDICINE

## 2024-05-06 PROCEDURE — 3008F BODY MASS INDEX DOCD: CPT | Mod: CPTII,S$GLB,, | Performed by: INTERNAL MEDICINE

## 2024-05-06 PROCEDURE — 99999 PR PBB SHADOW E&M-EST. PATIENT-LVL V: CPT | Mod: PBBFAC,,, | Performed by: INTERNAL MEDICINE

## 2024-05-06 PROCEDURE — 3075F SYST BP GE 130 - 139MM HG: CPT | Mod: CPTII,S$GLB,, | Performed by: INTERNAL MEDICINE

## 2024-05-06 PROCEDURE — 1160F RVW MEDS BY RX/DR IN RCRD: CPT | Mod: CPTII,S$GLB,, | Performed by: INTERNAL MEDICINE

## 2024-05-06 PROCEDURE — 3079F DIAST BP 80-89 MM HG: CPT | Mod: CPTII,S$GLB,, | Performed by: INTERNAL MEDICINE

## 2024-05-06 RX ORDER — HYDROCORTISONE 10 MG/1
TABLET ORAL
Qty: 45 TABLET | Refills: 11 | Status: SHIPPED | OUTPATIENT
Start: 2024-05-06

## 2024-05-06 NOTE — H&P
Short Stay Endoscopy History and Physical    PCP - Eloise Schneider MD  Referring Physician - Eloise Schneider  No address on file    Procedure - Colonoscopy  ASA - per anesthesia  Mallampati - per anesthesia  History of Anesthesia problems - no  Family history Anesthesia problems -  no   Plan of anesthesia - General    HPI  58 y.o. female  Reason for procedure:   Screening for colorectal cancer [Z12.11, Z12.12]     Cscope one polyp 2017     ROS:  Constitutional: No fevers, chills, No weight loss  CV: No chest pain  Pulm: No cough, No shortness of breath  GI: see HPI    Medical History:  has a past medical history of HTN (hypertension), Hyperprolactinemia, Morbid obesity, JUAN (obstructive sleep apnea), Pituitary adenoma, Secondary hypothyroidism (2015), Type II or unspecified type diabetes mellitus without mention of complication, uncontrolled, and Vitamin D deficiency disease.    Surgical History:  has a past surgical history that includes  section; Tubal ligation; Breast Reduction; Transphenoidal pituitary resection (14); Colonoscopy (N/A, 2017); and Total Reduction Mammoplasty (Bilateral, ).    Family History: family history includes Diabetes in her maternal aunt and mother; Heart disease in her mother; Hypertension in her father and mother; No Known Problems in her brother, brother, daughter, daughter, maternal grandfather, maternal grandmother, maternal uncle, paternal aunt, paternal grandfather, paternal grandmother, paternal uncle, sister, and son..    Social History:  reports that she has never smoked. She has been exposed to tobacco smoke. She has never used smokeless tobacco. She reports that she does not drink alcohol and does not use drugs.    Review of patient's allergies indicates:   Allergen Reactions    Lisinopril Hives       Medications:   Medications Prior to Admission   Medication Sig Dispense Refill Last Dose    hydrocortisone (CORTEF) 10 MG Tab TAKE 1 TABLET BY  MOUTH ONCE DAILY WITH BREAKFAST AND  ONE-HALF  TABLET  ONCE  DAILY  WITH  DINNER 45 tablet 11     levothyroxine (SYNTHROID) 75 MCG tablet TAKE 1 TABLET BY MOUTH BEFORE BREAKFAST 90 tablet 3     multivitamin (THERAGRAN) per tablet Take 1 tablet by mouth every morning.        pravastatin (PRAVACHOL) 20 MG tablet Take 1 tablet (20 mg total) by mouth once daily. For Cholesterol. 90 tablet 3     triamcinolone acetonide 0.1% (KENALOG) 0.1 % ointment Apply topically 2 (two) times daily. 30 g 1     triamterene-hydrochlorothiazide 37.5-25 mg (DYAZIDE) 37.5-25 mg per capsule Take 1 capsule by mouth once daily. 90 capsule 3     verapamiL (CALAN-SR) 240 MG CR tablet TAKE 1 TABLET (240 MG TOTAL) BY MOUTH ONCE DAILY. 90 tablet 3     zolpidem (AMBIEN) 5 MG Tab Take 1 tablet (5 mg total) by mouth nightly as needed (Insomnia.). 30 tablet 2        Physical Exam:    Vital Signs: There were no vitals filed for this visit.    General Appearance: Well appearing in no acute distress  Abdomen: Soft, non tender, non distended with normal bowel sounds, no masses    Labs:  Lab Results   Component Value Date    WBC 7.26 07/18/2023    HGB 14.8 07/18/2023    HCT 45.0 07/18/2023     07/18/2023    CHOL 160 07/18/2023    TRIG 178 (H) 07/18/2023    HDL 34 (L) 07/18/2023    ALT 13 05/31/2023    AST 22 05/31/2023     05/31/2023    K 3.6 05/31/2023     05/31/2023    CREATININE 1.0 05/31/2023    BUN 13 05/31/2023    CO2 29 05/31/2023    TSH <0.010 (L) 03/31/2022    INR 1.0 08/11/2014    HGBA1C 5.5 01/17/2024       I have explained the risks and benefits of this endoscopic procedure to the patient including but not limited to bleeding, inflammation, infection, perforation, and death.    Assessment/Plan:     CRC Surveillance     - Proceed with colonoscopy     Gisela Jackson MD  Gastroenterology   [facility]

## 2024-05-06 NOTE — ASSESSMENT & PLAN NOTE
MRI stable 2021  Given order for external MRI to check on cost  Due to see Dr. Willis with imaging

## 2024-05-06 NOTE — ANESTHESIA PREPROCEDURE EVALUATION
05/06/2024  Matt Lomax is a 58 y.o., female.      Pre-op Assessment    I have reviewed the Patient Summary Reports.     I have reviewed the Nursing Notes. I have reviewed the NPO Status.   I have reviewed the Medications.     Review of Systems  Anesthesia Hx:             Denies Family Hx of Anesthesia complications.    Denies Personal Hx of Anesthesia complications.                    Social:  Non-Smoker, No Alcohol Use       Hematology/Oncology:  Hematology Normal   Oncology Normal                                   EENT/Dental:  EENT/Dental Normal           Cardiovascular:     Hypertension                                        Pulmonary:        Sleep Apnea                Renal/:  Renal/ Normal                 Hepatic/GI:  Hepatic/GI Normal                 Musculoskeletal:  Musculoskeletal Normal                Neurological:  Neurology Normal                                      Endocrine:  Diabetes, type 2 Hypothyroidism          Dermatological:  Skin Normal    Psych:  Psychiatric Normal                Procedure: COLONOSCOPY (N/A)         Patient Active Problem List   Diagnosis    Hypertension    JUAN on CPAP    Obesity, morbid, BMI 40.0-49.9    Pituitary adenoma    Secondary adrenal insufficiency    Secondary hypothyroidism    H/O secondary hypogonadism    Hyperlipidemia    Insomnia    Type 2 diabetes mellitus without complication, without long-term current use of insulin       Past Medical History:   Diagnosis Date    HTN (hypertension)     Hyperprolactinemia     Morbid obesity     JUAN (obstructive sleep apnea)     Pituitary adenoma     Secondary hypothyroidism 4/21/2015    Type II or unspecified type diabetes mellitus without mention of complication, uncontrolled     Vitamin D deficiency disease        ECHO: No results found for this or any previous visit.      There is no height or  weight on file to calculate BMI.    Tobacco Use: Medium Risk (1/29/2024)    Patient History     Smoking Tobacco Use: Never     Smokeless Tobacco Use: Never     Passive Exposure: Current       Social History     Substance and Sexual Activity   Drug Use No        Alcohol Use: Not At Risk (7/31/2020)    AUDIT-C     Frequency of Alcohol Consumption: Never     Average Number of Drinks: Patient declined     Frequency of Binge Drinking: Never       Review of patient's allergies indicates:   Allergen Reactions    Lisinopril Hives         Airway:  No value filed.     Physical Exam  General: Well nourished, Cooperative, Alert and Oriented    Airway:  Mouth Opening: Normal  TM Distance: Normal  Tongue: Normal  Neck ROM: Normal ROM    Chest/Lungs:  Clear to auscultation    Heart:  Rate: Normal    Abdomen:  Normal        Anesthesia Plan  Type of Anesthesia, risks & benefits discussed:    Anesthesia Type: Gen Natural Airway  Intra-op Monitoring Plan: Standard ASA Monitors  Induction:  IV  Informed Consent: Informed consent signed with the Patient and all parties understand the risks and agree with anesthesia plan.  All questions answered. Patient consented to blood products? No  ASA Score: 3  Day of Surgery Review of History & Physical: H&P Update referred to the surgeon/provider.    Ready For Surgery From Anesthesia Perspective.     .

## 2024-05-06 NOTE — ASSESSMENT & PLAN NOTE
A1c normal off metformin  Recent weight loss and discussed continued attention to diet to prevent regain and reduce risk of preDM

## 2024-05-06 NOTE — PATIENT INSTRUCTIONS
I will give you MRI slip to get scheduled. Please ask them for a disc with images because Dr. Willis will want that  Schedule visit with Dr. Willis after you get MRI done

## 2024-05-06 NOTE — PROGRESS NOTES
Matt Lomax is a 58 y.o. female with HTN, HLD, JUAN presenting for follow-up of pituitary macroadenoma s/p resection, secondary AI, hypothyroidism      History of Present Illness  Diagnosed with macroadenoma, possible prolactinoma, ~ in 2006 and started on cabergoline at that time due to elevated prolactin (highest value I can find is 67). Suspect stalk effect from macroadenoma   Monitored with MRI with increase in size over time so she was referred to  and she had surgery in 7/2014.       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     Secondary adrenal insufficiency   HC 10-0-5-0  She denies any dizziness, nausea, vomiting, lightheadedness.   Some sluggishness around noon which is new     Secondary hypothyroidism  On levothyroxine 75 mcg daily. Takes on empty stomach separate from other meds.  Free T4 at goal 1/2024    Has recently lost multiple family members and with this diet has been poor but also with weight loss  She has good support system     No cycles since day of surgery  Occasional hot flashes  DXA 5/2020 with normal BMD     Optometry visit 11/2018 - no retinopathy  Denies headache or vision change  Seen by [facility] 3/2021. To go back in 2023 with MRI but cost has been prohibitive    Last MRI 3/2021:   MR sella: No significant change from prior continued heterogeneous signal and expansion posterior sella wall with thin marginated enhancement.  While nonspecific remains concerning for residual lesion which may represent intraosseous meningioma with pituitary adenoma to be included in differential.  No evidence for new signal abnormality or increased sized lesion.    Denies headache, vision change     HbA1c improved and metformin d/c by PCP in summer 2017. Last A1c normal, weight loss as above although notes that diet has not been great  Lab Results   Component Value Date     HGBA1C 5.5 01/17/2024        Has hypertension on dyazide and verapamil with stable blood pressure     Vit d insufficiency -D3 1000 units daily     Vit D, 25-Hydroxy   Date Value Ref Range Status   05/31/2023 30 30 - 96 ng/mL Final     Comment:     Vitamin D deficiency.........<10 ng/mL                              Vitamin D insufficiency......10-29 ng/mL       Vitamin D sufficiency........> or equal to 30 ng/mL  Vitamin D toxicity............>100 ng/mL             Current Outpatient Medications:     levothyroxine (SYNTHROID) 75 MCG tablet, TAKE 1 TABLET BY MOUTH BEFORE BREAKFAST, Disp: 90 tablet, Rfl: 3    multivitamin (THERAGRAN) per tablet, Take 1 tablet by mouth every morning. , Disp: , Rfl:     pravastatin (PRAVACHOL) 20 MG tablet, Take 1 tablet (20 mg total) by mouth once daily. For Cholesterol., Disp: 90 tablet, Rfl: 3    triamcinolone acetonide 0.1% (KENALOG) 0.1 % ointment, Apply topically 2 (two) times daily., Disp: 30 g, Rfl: 1    triamterene-hydrochlorothiazide 37.5-25 mg (DYAZIDE) 37.5-25 mg per capsule, Take 1 capsule by mouth once daily., Disp: 90 capsule, Rfl: 3    verapamiL (CALAN-SR) 240 MG CR tablet, TAKE 1 TABLET (240 MG TOTAL) BY MOUTH ONCE DAILY., Disp: 90 tablet, Rfl: 3    zolpidem (AMBIEN) 5 MG Tab, Take 1 tablet (5 mg total) by mouth nightly as needed (Insomnia.)., Disp: 30 tablet, Rfl: 2    hydrocortisone (CORTEF) 10 MG Tab, TAKE 1 TABLET BY MOUTH ONCE DAILY WITH BREAKFAST AND  ONE-HALF  TABLET  ONCE  DAILY  WITH  DINNER, Disp: 45 tablet, Rfl: 11    ROS as above    Objective:     Vitals:    05/06/24 1133   BP: 136/82   Pulse: 64       Wt Readings from Last 3 Encounters:   05/06/24 1133 93.5 kg (206 lb 2.1 oz)   01/29/24 1237 96.6 kg (213 lb)   01/29/24 0813 96.6 kg (213 lb)     Physical Exam  Constitutional:       Appearance: She is well-developed.   HENT:      Head: Normocephalic.   Eyes:      Conjunctiva/sclera: Conjunctivae normal.   Pulmonary:      Effort: Pulmonary effort is normal.    Musculoskeletal:         General: Normal range of motion.   Skin:     General: Skin is warm.      Findings: No rash.   Neurological:      Mental Status: She is alert and oriented to person, place, and time.           Body mass index is 40.26 kg/m².    LABS    Chemistry        Component Value Date/Time     05/31/2023 1049    K 3.6 05/31/2023 1049     05/31/2023 1049    CO2 29 05/31/2023 1049    BUN 13 05/31/2023 1049    CREATININE 1.0 05/31/2023 1049     05/31/2023 1049        Component Value Date/Time    CALCIUM 9.8 05/31/2023 1049    ALKPHOS 87 05/31/2023 1049    AST 22 05/31/2023 1049    ALT 13 05/31/2023 1049    BILITOT 0.5 05/31/2023 1049    ESTGFRAFRICA >60.0 03/31/2022 1121    EGFRNONAA >60.0 03/31/2022 1121        Lab Results   Component Value Date    HGBA1C 5.5 01/17/2024     Vit D, 25-Hydroxy   Date Value Ref Range Status   05/31/2023 30 30 - 96 ng/mL Final     Comment:     Vitamin D deficiency.........<10 ng/mL                              Vitamin D insufficiency......10-29 ng/mL       Vitamin D sufficiency........> or equal to 30 ng/mL  Vitamin D toxicity............>100 ng/mL       Component      Latest Ref Rng & Units 10/11/2022   Prolactin      5.2 - 26.5 ng/mL 19.7   Free T4      0.71 - 1.51 ng/dL 0.93       Assessment and Plan     Pituitary adenoma  MRI stable 2021  Given order for external MRI to check on cost  Due to see Dr. Willis with imaging    Secondary adrenal insufficiency  On adequate replacement dose.  Cont HC 10/5 mg  aware of sick day rules        Secondary hypothyroidism  FT4 at goal  Cont levothyroxine 75 mcg daily    H/O secondary hypogonadism  DXA normal 2020  Repeat age 66 or sooner if additional risk factors or clinical change    Obesity, morbid, BMI 40.0-49.9  A1c normal off metformin  Recent weight loss and discussed continued attention to diet to prevent regain and reduce risk of preDM          RTC 1 year    Trudy Qiu MD      Visit today included increased  complexity associated with the care of the problems addressed and managing the longitudinal care of the patient due to the serious and/or complex managed problems

## 2024-05-07 ENCOUNTER — HOSPITAL ENCOUNTER (OUTPATIENT)
Facility: HOSPITAL | Age: 59
Discharge: HOME | End: 2024-05-07
Attending: STUDENT IN AN ORGANIZED HEALTH CARE EDUCATION/TRAINING PROGRAM | Admitting: STUDENT IN AN ORGANIZED HEALTH CARE EDUCATION/TRAINING PROGRAM
Payer: COMMERCIAL

## 2024-05-07 ENCOUNTER — ANESTHESIA (OUTPATIENT)
Dept: ENDOSCOPY | Facility: HOSPITAL | Age: 59
End: 2024-05-07
Payer: COMMERCIAL

## 2024-05-07 VITALS
DIASTOLIC BLOOD PRESSURE: 84 MMHG | RESPIRATION RATE: 19 BRPM | SYSTOLIC BLOOD PRESSURE: 160 MMHG | BODY MASS INDEX: 40.25 KG/M2 | OXYGEN SATURATION: 99 % | HEART RATE: 77 BPM | WEIGHT: 205 LBS | TEMPERATURE: 97 F | HEIGHT: 60 IN

## 2024-05-07 DIAGNOSIS — Z12.11 COLON CANCER SCREENING: Primary | ICD-10-CM

## 2024-05-07 LAB — POCT GLUCOSE: 66 MG/DL (ref 70–110)

## 2024-05-07 PROCEDURE — 37000008 HC ANESTHESIA 1ST 15 MINUTES: Performed by: STUDENT IN AN ORGANIZED HEALTH CARE EDUCATION/TRAINING PROGRAM

## 2024-05-07 PROCEDURE — 25000003 PHARM REV CODE 250: Performed by: NURSE ANESTHETIST, CERTIFIED REGISTERED

## 2024-05-07 PROCEDURE — 94761 N-INVAS EAR/PLS OXIMETRY MLT: CPT

## 2024-05-07 PROCEDURE — 88305 TISSUE EXAM BY PATHOLOGIST: CPT | Mod: 26,,, | Performed by: STUDENT IN AN ORGANIZED HEALTH CARE EDUCATION/TRAINING PROGRAM

## 2024-05-07 PROCEDURE — 63600175 PHARM REV CODE 636 W HCPCS: Performed by: NURSE ANESTHETIST, CERTIFIED REGISTERED

## 2024-05-07 PROCEDURE — 82962 GLUCOSE BLOOD TEST: CPT | Performed by: STUDENT IN AN ORGANIZED HEALTH CARE EDUCATION/TRAINING PROGRAM

## 2024-05-07 PROCEDURE — 99900035 HC TECH TIME PER 15 MIN (STAT)

## 2024-05-07 PROCEDURE — 45385 COLONOSCOPY W/LESION REMOVAL: CPT | Mod: PT,,, | Performed by: STUDENT IN AN ORGANIZED HEALTH CARE EDUCATION/TRAINING PROGRAM

## 2024-05-07 PROCEDURE — 45385 COLONOSCOPY W/LESION REMOVAL: CPT | Mod: PT,59 | Performed by: STUDENT IN AN ORGANIZED HEALTH CARE EDUCATION/TRAINING PROGRAM

## 2024-05-07 PROCEDURE — 27201089 HC SNARE, DISP (ANY): Performed by: STUDENT IN AN ORGANIZED HEALTH CARE EDUCATION/TRAINING PROGRAM

## 2024-05-07 PROCEDURE — 88305 TISSUE EXAM BY PATHOLOGIST: CPT | Performed by: STUDENT IN AN ORGANIZED HEALTH CARE EDUCATION/TRAINING PROGRAM

## 2024-05-07 PROCEDURE — D9220A PRA ANESTHESIA: Mod: 33,,, | Performed by: NURSE ANESTHETIST, CERTIFIED REGISTERED

## 2024-05-07 PROCEDURE — 27201012 HC FORCEPS, HOT/COLD, DISP: Performed by: STUDENT IN AN ORGANIZED HEALTH CARE EDUCATION/TRAINING PROGRAM

## 2024-05-07 PROCEDURE — 45380 COLONOSCOPY AND BIOPSY: CPT | Mod: PT | Performed by: STUDENT IN AN ORGANIZED HEALTH CARE EDUCATION/TRAINING PROGRAM

## 2024-05-07 PROCEDURE — 37000009 HC ANESTHESIA EA ADD 15 MINS: Performed by: STUDENT IN AN ORGANIZED HEALTH CARE EDUCATION/TRAINING PROGRAM

## 2024-05-07 PROCEDURE — 45380 COLONOSCOPY AND BIOPSY: CPT | Mod: PT,59,, | Performed by: STUDENT IN AN ORGANIZED HEALTH CARE EDUCATION/TRAINING PROGRAM

## 2024-05-07 RX ORDER — LIDOCAINE HYDROCHLORIDE 20 MG/ML
INJECTION INTRAVENOUS
Status: DISCONTINUED | OUTPATIENT
Start: 2024-05-07 | End: 2024-05-07

## 2024-05-07 RX ORDER — PROPOFOL 10 MG/ML
VIAL (ML) INTRAVENOUS CONTINUOUS PRN
Status: DISCONTINUED | OUTPATIENT
Start: 2024-05-07 | End: 2024-05-07

## 2024-05-07 RX ORDER — SODIUM CHLORIDE 9 MG/ML
INJECTION, SOLUTION INTRAVENOUS CONTINUOUS
Status: DISCONTINUED | OUTPATIENT
Start: 2024-05-07 | End: 2024-05-07 | Stop reason: HOSPADM

## 2024-05-07 RX ORDER — PROPOFOL 10 MG/ML
VIAL (ML) INTRAVENOUS
Status: DISCONTINUED | OUTPATIENT
Start: 2024-05-07 | End: 2024-05-07

## 2024-05-07 RX ADMIN — GLYCOPYRROLATE 0.1 MG: 0.2 INJECTION, SOLUTION INTRAMUSCULAR; INTRAVENOUS at 11:05

## 2024-05-07 RX ADMIN — PROPOFOL 80 MG: 10 INJECTION, EMULSION INTRAVENOUS at 11:05

## 2024-05-07 RX ADMIN — LIDOCAINE HYDROCHLORIDE 100 MG: 20 INJECTION INTRAVENOUS at 11:05

## 2024-05-07 RX ADMIN — SODIUM CHLORIDE: 0.9 INJECTION, SOLUTION INTRAVENOUS at 10:05

## 2024-05-07 RX ADMIN — PROPOFOL 200 MCG/KG/MIN: 10 INJECTION, EMULSION INTRAVENOUS at 11:05

## 2024-05-07 NOTE — TRANSFER OF CARE
Anesthesia Transfer of Care Note    Patient: Matt Lomax    Procedure(s) Performed: Procedure(s) (LRB):  COLONOSCOPY (N/A)    Patient location: PACU    Anesthesia Type: general    Transport from OR: Transported from OR on room air with adequate spontaneous ventilation    Post pain: adequate analgesia    Post assessment: no apparent anesthetic complications and tolerated procedure well    Post vital signs: stable    Level of consciousness: responds to stimulation and sedated    Nausea/Vomiting: no nausea/vomiting    Complications: none    Transfer of care protocol was followed      Last vitals: Visit Vitals  BP (!) 153/74 (BP Location: Left arm, Patient Position: Lying)   Pulse (!) 55   Temp 36.6 °C (97.9 °F) (Temporal)   Resp 18   Ht 5' (1.524 m)   Wt 93 kg (205 lb)   LMP 07/21/2014   SpO2 97%   Breastfeeding No   BMI 40.04 kg/m²

## 2024-05-07 NOTE — PROVATION PATIENT INSTRUCTIONS
Discharge Summary/Instructions after an Endoscopic Procedure  Patient Name: Matt Lomax  Patient MRN: [id number]  Patient YOB: 1965  Tuesday, May 7, 2024  Gisela Jackson MD  Dear patient,  As a result of recent federal legislation (The Federal Cures Act), you may   receive lab or pathology results from your procedure in your [facility]   account before your physician is able to contact you. Your physician or   their representative will relay the results to you with their   recommendations at their soonest availability.  Thank you,  RESTRICTIONS:  During your procedure today, you received medications for sedation.  These   medications may affect your judgment, balance and coordination.  Therefore,   for 24 hours, you have the following restrictions:   - DO NOT drive a car, operate machinery, make legal/financial decisions,   sign important papers or drink alcohol.    ACTIVITY:  Today: no heavy lifting, straining or running due to procedural   sedation/anesthesia.  The following day: return to full activity including work.  DIET:  Eat and drink normally unless instructed otherwise.     TREATMENT FOR COMMON SIDE EFFECTS:  - Mild abdominal pain, nausea, belching, bloating or excessive gas:  rest,   eat lightly and use a heating pad.  - Sore Throat: treat with throat lozenges and/or gargle with warm salt   water.  - Because air was used during the procedure, expelling large amounts of air   from your rectum or belching is normal.  - If a bowel prep was taken, you may not have a bowel movement for 1-3 days.    This is normal.  SYMPTOMS TO WATCH FOR AND REPORT TO YOUR PHYSICIAN:  1. Abdominal pain or bloating, other than gas cramps.  2. Chest pain.  3. Back pain.  4. Signs of infection such as: chills or fever occurring within 24 hours   after the procedure.  5. Rectal bleeding, which would show as bright red, maroon, or black stools.   (A tablespoon of blood from the rectum is not serious, especially if    hemorrhoids are present.)  6. Vomiting.  7. Weakness or dizziness.  GO DIRECTLY TO THE NEAREST EMERGENCY ROOM IF YOU HAVE ANY OF THE FOLLOWING:      Difficulty breathing              Chills and/or fever over 101 F   Persistent vomiting and/or vomiting blood   Severe abdominal pain   Severe chest pain   Black, tarry stools   Bleeding- more than one tablespoon   Any other symptom or condition that you feel may need urgent attention  Your doctor recommends these additional instructions:  If any biopsies were taken, your doctors clinic will contact you in 1 to 2   weeks with any results.  - Discharge patient to home (ambulatory).   - Resume regular diet.   - Continue present medications.   - Await pathology results.   - Repeat colonoscopy date to be determined after pending pathology results   are reviewed for surveillance.  For questions, problems or results please call your physician - Gisela Jackson MD at Work:  [phone].  [facility], EMERGENCY ROOM PHONE NUMBER: [phone]  IF A COMPLICATION OR EMERGENCY SITUATION ARISES AND YOU ARE UNABLE TO REACH   YOUR PHYSICIAN - GO DIRECTLY TO THE EMERGENCY ROOM.  Gisela Jackson MD  5/7/2024 12:14:28 PM  This report has been verified and signed electronically.  Dear patient,  As a result of recent federal legislation (The Federal Cures Act), you may   receive lab or pathology results from your procedure in your [facility]   account before your physician is able to contact you. Your physician or   their representative will relay the results to you with their   recommendations at their soonest availability.  Thank you,  PROVATION

## 2024-05-07 NOTE — ANESTHESIA POSTPROCEDURE EVALUATION
Anesthesia Post Evaluation    Patient: Matt Lomax    Procedure(s) Performed: Procedure(s) (LRB):  COLONOSCOPY (N/A)    Final Anesthesia Type: general      Patient location during evaluation: GI PACU  Patient participation: Yes- Able to Participate  Level of consciousness: awake and alert  Post-procedure vital signs: reviewed and stable  Pain management: adequate  Airway patency: patent    PONV status at discharge: No PONV  Anesthetic complications: no      Cardiovascular status: blood pressure returned to baseline and hemodynamically stable  Respiratory status: unassisted  Hydration status: euvolemic  Follow-up not needed.              Vitals Value Taken Time   /88 05/07/24 1241   Temp 36.2 °C (97.1 °F) 05/07/24 1212   Pulse 71 05/07/24 1241   Resp 17 05/07/24 1241   SpO2 100 % 05/07/24 1241   Vitals shown include unfiled device data.      Event Time   Out of Recovery 12:32:00         Pain/Tye Score: Tye Score: 10 (5/7/2024 12:30 PM)

## 2024-05-10 LAB
FINAL PATHOLOGIC DIAGNOSIS: NORMAL
GROSS: NORMAL
Lab: NORMAL

## 2024-05-13 ENCOUNTER — PATIENT MESSAGE (OUTPATIENT)
Dept: PRIMARY CARE CLINIC | Facility: CLINIC | Age: 59
End: 2024-05-13

## 2024-05-13 ENCOUNTER — OFFICE VISIT (OUTPATIENT)
Dept: PRIMARY CARE CLINIC | Facility: CLINIC | Age: 59
End: 2024-05-13
Payer: COMMERCIAL

## 2024-05-13 DIAGNOSIS — F41.1 GENERALIZED ANXIETY DISORDER: Primary | ICD-10-CM

## 2024-05-13 DIAGNOSIS — E78.5 HYPERLIPIDEMIA, UNSPECIFIED HYPERLIPIDEMIA TYPE: ICD-10-CM

## 2024-05-13 PROCEDURE — 3044F HG A1C LEVEL LT 7.0%: CPT | Mod: CPTII,95,, | Performed by: INTERNAL MEDICINE

## 2024-05-13 PROCEDURE — 99214 OFFICE O/P EST MOD 30 MIN: CPT | Mod: 95,,, | Performed by: INTERNAL MEDICINE

## 2024-05-13 RX ORDER — LORAZEPAM 0.5 MG/1
0.5 TABLET ORAL EVERY 12 HOURS PRN
Qty: 45 TABLET | Refills: 0 | Status: SHIPPED | OUTPATIENT
Start: 2024-05-13 | End: 2024-06-12

## 2024-05-13 RX ORDER — SERTRALINE HYDROCHLORIDE 25 MG/1
25 TABLET, FILM COATED ORAL DAILY
Qty: 90 TABLET | Refills: 1 | Status: SHIPPED | OUTPATIENT
Start: 2024-05-13 | End: 2025-05-13

## 2024-05-13 NOTE — PROGRESS NOTES
[facility]  Internal Medicine Clinic Note  The patient location is: [place]  The chief complaint leading to consultation is: ISAIAH    Visit type: audiovisual    Face to Face time with patient: 10  10 minutes of total time spent on the encounter, which includes face to face time and non-face to face time preparing to see the patient (eg, review of tests), Obtaining and/or reviewing separately obtained history, Documenting clinical information in the electronic or other health record, Independently interpreting results (not separately reported) and communicating results to the patient/family/caregiver, or Care coordination (not separately reported).         Each patient to whom he or she provides medical services by telemedicine is:  (1) informed of the relationship between the physician and patient and the respective role of any other health care provider with respect to management of the patient; and (2) notified that he or she may decline to receive medical services by telemedicine and may withdraw from such care at any time.    Notes:     Chief Complaint    No chief complaint on file.    History of Present Illness      Matt Lomax is a 58 y.o. female who presents today for chief complaint ISAIAH.     Anxiety  Symptoms include insomnia and nervous/anxious behavior. Patient reports no chest pain, nausea, palpitations or shortness of breath.          Her   in dec and her son was murdered in April, she is interested in an anxiety medication   Active Problem List with Overview Notes    Diagnosis Date Noted    Type 2 diabetes mellitus without complication, without long-term current use of insulin 2020     At goal no change continue diet control       Insomnia 2019    Hyperlipidemia 2018    Secondary hypothyroidism 2015    H/O secondary hypogonadism 2015    Secondary adrenal insufficiency 10/21/2014    Pituitary adenoma 2014    JUAN on CPAP 2014    Obesity, morbid,  BMI 40.0-49.9 2014    Hypertension 2013       Health Maintenance   Topic Date Due    Shingles Vaccine (1 of 2) Never done    Mammogram  2024    Hemoglobin A1c  2024    Lipid Panel  10/10/2024    Low Dose Statin  2025    TETANUS VACCINE  2026    Colorectal Cancer Screening  2029    Hepatitis C Screening  Completed    Foot Exam  Discontinued    Eye Exam  Discontinued       Past Medical History:   Diagnosis Date    HTN (hypertension)     Hyperprolactinemia     Morbid obesity     JUAN (obstructive sleep apnea)     Pituitary adenoma     Secondary hypothyroidism 2015    Type II or unspecified type diabetes mellitus without mention of complication, uncontrolled     Vitamin D deficiency disease        Past Surgical History:   Procedure Laterality Date    Breast Reduction       SECTION      x 3    COLONOSCOPY N/A 2017    Procedure: COLONOSCOPY;  Surgeon: Ciro Rebolledo MD;  Location: [facility] ENDO ([address] [place] [address]);  Service: Endoscopy;  Laterality: N/A;    COLONOSCOPY N/A 2024    Procedure: COLONOSCOPY;  Surgeon: Gisela Jackson MD;  Location: [facility] ENDOSCOPY;  Service: Endoscopy;  Laterality: N/A;  referral: Dr. Schneider / inst. to patient portal. peg prep. Tbou   - pt resched to  due to illness SW  24- lvm and portal msg for pc. DBM  24- pt returned call, pc complete. DBM    TOTAL REDUCTION MAMMOPLASTY Bilateral 2010    TRANSPHENOIDAL PITUITARY RESECTION  14    TUBAL LIGATION         family history includes Diabetes in her maternal aunt and mother; Heart disease in her mother; Hypertension in her father and mother; No Known Problems in her brother, brother, daughter, daughter, maternal grandfather, maternal grandmother, maternal uncle, paternal aunt, paternal grandfather, paternal grandmother, paternal uncle, sister, and son.    Social History     Tobacco Use    Smoking status: Never     Passive exposure: Current    Smokeless tobacco: Never    Substance Use Topics    Alcohol use: No    Drug use: No       Review of Systems   Constitutional:  Negative for chills, fever, malaise/fatigue and weight loss.   HENT:  Negative for hearing loss.    Eyes:  Negative for discharge.   Respiratory:  Negative for cough, sputum production, shortness of breath and wheezing.    Cardiovascular:  Negative for chest pain, palpitations, orthopnea and leg swelling.   Gastrointestinal:  Negative for blood in stool, constipation, diarrhea, nausea and vomiting.   Genitourinary:  Negative for dysuria, frequency, hematuria and urgency.   Musculoskeletal:  Negative for neck pain.   Neurological:  Negative for weakness and headaches.   Endo/Heme/Allergies:  Negative for polydipsia.   Psychiatric/Behavioral:  The patient is nervous/anxious and has insomnia.         Outpatient Encounter Medications as of 5/13/2024   Medication Sig Note Dispense Refill    hydrocortisone (CORTEF) 10 MG Tab TAKE 1 TABLET BY MOUTH ONCE DAILY WITH BREAKFAST AND  ONE-HALF  TABLET  ONCE  DAILY  WITH  DINNER  45 tablet 11    levothyroxine (SYNTHROID) 75 MCG tablet TAKE 1 TABLET BY MOUTH BEFORE BREAKFAST  90 tablet 3    LORazepam (ATIVAN) 0.5 MG tablet Take 1 tablet (0.5 mg total) by mouth every 12 (twelve) hours as needed for Anxiety.  45 tablet 0    multivitamin (THERAGRAN) per tablet Take 1 tablet by mouth every morning.  7/14/2014: Hold the morning of surgery.       pravastatin (PRAVACHOL) 20 MG tablet Take 1 tablet (20 mg total) by mouth once daily. For Cholesterol.  90 tablet 3    sertraline (ZOLOFT) 25 MG tablet Take 1 tablet (25 mg total) by mouth once daily.  90 tablet 1    triamcinolone acetonide 0.1% (KENALOG) 0.1 % ointment Apply topically 2 (two) times daily.  30 g 1    triamterene-hydrochlorothiazide 37.5-25 mg (DYAZIDE) 37.5-25 mg per capsule Take 1 capsule by mouth once daily.  90 capsule 3    verapamiL (CALAN-SR) 240 MG CR tablet TAKE 1 TABLET (240 MG TOTAL) BY MOUTH ONCE DAILY.  90 tablet 3     "zolpidem (AMBIEN) 5 MG Tab Take 1 tablet (5 mg total) by mouth nightly as needed (Insomnia.).  30 tablet 2     No facility-administered encounter medications on file as of 5/13/2024.        Review of patient's allergies indicates:   Allergen Reactions    Lisinopril Hives           Physical Exam       ]    Physical Exam  Constitutional:       General: She is not in acute distress.     Appearance: She is well-developed. She is not diaphoretic.   HENT:      Head: Normocephalic and atraumatic.      Right Ear: External ear normal.      Left Ear: External ear normal.      Nose: Nose normal.   Eyes:      General:         Right eye: No discharge.         Left eye: No discharge.      Conjunctiva/sclera: Conjunctivae normal.   Pulmonary:      Effort: Pulmonary effort is normal. No respiratory distress.   Musculoskeletal:         General: Normal range of motion.      Cervical back: Normal range of motion.   Skin:     Coloration: Skin is not pale.      Findings: No rash.   Neurological:      Mental Status: She is alert and oriented to person, place, and time.   Psychiatric:         Behavior: Behavior normal.         Thought Content: Thought content normal.          Laboratory:  CBC:  No results for input(s): "WBC", "RBC", "HGB", "HCT", "PLT", "MCV", "MCH", "MCHC" in the last 2160 hours.  CMP:  No results for input(s): "GLU", "CALCIUM", "ALBUMIN", "PROT", "NA", "K", "CO2", "CL", "BUN", "ALKPHOS", "ALT", "AST", "BILITOT" in the last 2160 hours.    Invalid input(s): "CREATININ"  URINALYSIS:  No results for input(s): "COLORU", "CLARITYU", "SPECGRAV", "PHUR", "PROTEINUA", "GLUCOSEU", "BILIRUBINCON", "BLOODU", "WBCU", "RBCU", "BACTERIA", "MUCUS", "NITRITE", "LEUKOCYTESUR", "UROBILINOGEN", "HYALINECASTS" in the last 2160 hours.   LIPIDS:  No results for input(s): "TSH", "HDL", "CHOL", "TRIG", "LDLCALC", "CHOLHDL", "NONHDLCHOL", "TOTALCHOLEST" in the last 2160 hours.  TSH:  No results for input(s): "TSH" in the last 2160 " "hours.  A1C:  No results for input(s): "HGBA1C" in the last 2160 hours.    Radiology:      Assessment/Plan     Matt Lomax is a 58 y.o.female with:    1. Generalized anxiety disorder  -     sertraline (ZOLOFT) 25 MG tablet; Take 1 tablet (25 mg total) by mouth once daily.  Dispense: 90 tablet; Refill: 1    Other orders  -     LORazepam (ATIVAN) 0.5 MG tablet; Take 1 tablet (0.5 mg total) by mouth every 12 (twelve) hours as needed for Anxiety.  Dispense: 45 tablet; Refill: 0          Use of the [facility] Patient Portal discussed and encouraged during today's visit  -Continue current medications and maintain follow up with specialists.  Return to clinic in .  Future Appointments   Date Time Provider Department Center   7/16/2024  8:00 AM LAB, [facility] [place]   7/18/2024  8:20 AM Eloise Schneider MD [facility] PRI[facility] [place]   7/18/2024 11:10 AM OC  MAMMO1 [facility] MAMMO [facility] Schneider MD  5/13/2024 7:24 AM    Primary Care Internal Medicine                     "

## 2024-05-28 DIAGNOSIS — I10 ESSENTIAL HYPERTENSION: ICD-10-CM

## 2024-05-28 RX ORDER — VERAPAMIL HYDROCHLORIDE 240 MG/1
240 TABLET, FILM COATED, EXTENDED RELEASE ORAL DAILY
Qty: 90 TABLET | Refills: 3 | Status: SHIPPED | OUTPATIENT
Start: 2024-05-28

## 2024-06-06 DIAGNOSIS — I10 ESSENTIAL HYPERTENSION: ICD-10-CM

## 2024-06-06 DIAGNOSIS — E78.5 HYPERLIPIDEMIA, UNSPECIFIED HYPERLIPIDEMIA TYPE: ICD-10-CM

## 2024-06-06 RX ORDER — TRIAMTERENE AND HYDROCHLOROTHIAZIDE 37.5; 25 MG/1; MG/1
1 CAPSULE ORAL DAILY
Qty: 90 CAPSULE | Refills: 3 | Status: SHIPPED | OUTPATIENT
Start: 2024-06-06

## 2024-06-06 RX ORDER — PRAVASTATIN SODIUM 20 MG/1
20 TABLET ORAL DAILY
Qty: 90 TABLET | Refills: 3 | Status: SHIPPED | OUTPATIENT
Start: 2024-06-06

## 2024-06-06 RX ORDER — PRAVASTATIN SODIUM 20 MG/1
20 TABLET ORAL DAILY
Qty: 90 TABLET | Refills: 3 | Status: SHIPPED | OUTPATIENT
Start: 2024-06-06 | End: 2024-06-06 | Stop reason: SDUPTHER

## 2024-06-06 NOTE — TELEPHONE ENCOUNTER
----- Message from Derick Kaye sent at 6/6/2024 10:29 AM CDT -----  Please refill the medication(s) listed below.Please call the patient when the prescription(s) is ready for  at this phone number        Medication #1 pravastatin (PRAVACHOL) 20 MG tablet  Medication #2 triamterene-hydrochlorothiazide 37.5-25 mg (DYAZIDE) 37.5-25 mg per capsul  Medication #3      Preferred Pharmacy:  [facility] [address] [place] [address] [facility]  [address] [address] [address]  Phone: [phone] Fax: [phone]      Would the patient rather a call back or a response via [facility]? CALL    Best Call Back Number:.Telephone Information:  Mobile          [phone]        Additional Information: [facility] called in refill as  well as sent fax on pts behalf. Thank you

## 2024-06-25 ENCOUNTER — PATIENT MESSAGE (OUTPATIENT)
Dept: PRIMARY CARE CLINIC | Facility: CLINIC | Age: 59
End: 2024-06-25
Payer: COMMERCIAL

## 2024-06-25 NOTE — TELEPHONE ENCOUNTER
LMOVM to see if pt needs any meds refilled or anything changed prior to Annual on 7/18 due to issue with virtual at 1 pm today

## 2024-07-05 RX ORDER — LORAZEPAM 0.5 MG/1
0.5 TABLET ORAL EVERY 12 HOURS PRN
Qty: 45 TABLET | Refills: 0 | Status: SHIPPED | OUTPATIENT
Start: 2024-07-05

## 2024-07-05 NOTE — TELEPHONE ENCOUNTER
Care Due:                  Date            Visit Type   Department     Provider  --------------------------------------------------------------------------------                                ESTABLISHED                              PATIENT -    [facility] PRIMARY  Last Visit: 05-      [facility]      CARE           Eloise Schneider  Next Visit: None Scheduled  None         None Found                                                            Last  Test          Frequency    Reason                     Performed    Due Date  --------------------------------------------------------------------------------    CMP.........  12 months..  pravastatin..............  05- 05-    [facility] Embedded Care Due Messages. Reference number: [id number].   7/04/2024 8:33:06 PM CDT

## 2024-07-16 ENCOUNTER — LAB VISIT (OUTPATIENT)
Dept: LAB | Facility: HOSPITAL | Age: 59
End: 2024-07-16
Attending: INTERNAL MEDICINE
Payer: COMMERCIAL

## 2024-07-16 DIAGNOSIS — I10 HYPERTENSION, UNSPECIFIED TYPE: ICD-10-CM

## 2024-07-16 DIAGNOSIS — E03.8 SECONDARY HYPOTHYROIDISM: ICD-10-CM

## 2024-07-16 DIAGNOSIS — E55.9 VITAMIN D DEFICIENCY: ICD-10-CM

## 2024-07-16 DIAGNOSIS — E78.5 HYPERLIPIDEMIA, UNSPECIFIED HYPERLIPIDEMIA TYPE: ICD-10-CM

## 2024-07-16 DIAGNOSIS — E11.9 TYPE 2 DIABETES MELLITUS WITHOUT COMPLICATION, WITHOUT LONG-TERM CURRENT USE OF INSULIN: ICD-10-CM

## 2024-07-16 LAB
25(OH)D3+25(OH)D2 SERPL-MCNC: 25 NG/ML (ref 30–96)
ALBUMIN SERPL BCP-MCNC: 3.7 G/DL (ref 3.5–5.2)
ALBUMIN/CREAT UR: 13.1 UG/MG (ref 0–30)
ALP SERPL-CCNC: 81 U/L (ref 55–135)
ALT SERPL W/O P-5'-P-CCNC: 13 U/L (ref 10–44)
ANION GAP SERPL CALC-SCNC: 7 MMOL/L (ref 8–16)
AST SERPL-CCNC: 25 U/L (ref 10–40)
BILIRUB SERPL-MCNC: 0.6 MG/DL (ref 0.1–1)
BUN SERPL-MCNC: 10 MG/DL (ref 6–20)
CALCIUM SERPL-MCNC: 9.4 MG/DL (ref 8.7–10.5)
CHLORIDE SERPL-SCNC: 107 MMOL/L (ref 95–110)
CHOLEST SERPL-MCNC: 153 MG/DL (ref 120–199)
CHOLEST/HDLC SERPL: 4.6 {RATIO} (ref 2–5)
CO2 SERPL-SCNC: 26 MMOL/L (ref 23–29)
CREAT SERPL-MCNC: 0.9 MG/DL (ref 0.5–1.4)
CREAT UR-MCNC: 329 MG/DL (ref 15–325)
ERYTHROCYTE [DISTWIDTH] IN BLOOD BY AUTOMATED COUNT: 13.3 % (ref 11.5–14.5)
EST. GFR  (NO RACE VARIABLE): >60 ML/MIN/1.73 M^2
ESTIMATED AVG GLUCOSE: 111 MG/DL (ref 68–131)
GLUCOSE SERPL-MCNC: 95 MG/DL (ref 70–110)
HBA1C MFR BLD: 5.5 % (ref 4–5.6)
HCT VFR BLD AUTO: 40 % (ref 37–48.5)
HDLC SERPL-MCNC: 33 MG/DL (ref 40–75)
HDLC SERPL: 21.6 % (ref 20–50)
HGB BLD-MCNC: 13.8 G/DL (ref 12–16)
LDLC SERPL CALC-MCNC: 92.8 MG/DL (ref 63–159)
MCH RBC QN AUTO: 31.4 PG (ref 27–31)
MCHC RBC AUTO-ENTMCNC: 34.5 G/DL (ref 32–36)
MCV RBC AUTO: 91 FL (ref 82–98)
MICROALBUMIN UR DL<=1MG/L-MCNC: 43 UG/ML
NONHDLC SERPL-MCNC: 120 MG/DL
PLATELET # BLD AUTO: 268 K/UL (ref 150–450)
PMV BLD AUTO: 10.4 FL (ref 9.2–12.9)
POTASSIUM SERPL-SCNC: 3.5 MMOL/L (ref 3.5–5.1)
PROT SERPL-MCNC: 7 G/DL (ref 6–8.4)
RBC # BLD AUTO: 4.4 M/UL (ref 4–5.4)
SODIUM SERPL-SCNC: 140 MMOL/L (ref 136–145)
T4 FREE SERPL-MCNC: 1.01 NG/DL (ref 0.71–1.51)
TRIGL SERPL-MCNC: 136 MG/DL (ref 30–150)
WBC # BLD AUTO: 5.9 K/UL (ref 3.9–12.7)

## 2024-07-16 PROCEDURE — 36415 COLL VENOUS BLD VENIPUNCTURE: CPT | Performed by: INTERNAL MEDICINE

## 2024-07-16 PROCEDURE — 82043 UR ALBUMIN QUANTITATIVE: CPT | Performed by: INTERNAL MEDICINE

## 2024-07-16 PROCEDURE — 84439 ASSAY OF FREE THYROXINE: CPT | Performed by: INTERNAL MEDICINE

## 2024-07-16 PROCEDURE — 82306 VITAMIN D 25 HYDROXY: CPT | Performed by: INTERNAL MEDICINE

## 2024-07-16 PROCEDURE — 80053 COMPREHEN METABOLIC PANEL: CPT | Performed by: INTERNAL MEDICINE

## 2024-07-16 PROCEDURE — 82570 ASSAY OF URINE CREATININE: CPT | Performed by: INTERNAL MEDICINE

## 2024-07-16 PROCEDURE — 85027 COMPLETE CBC AUTOMATED: CPT | Performed by: INTERNAL MEDICINE

## 2024-07-16 PROCEDURE — 80061 LIPID PANEL: CPT | Performed by: INTERNAL MEDICINE

## 2024-07-16 PROCEDURE — 83036 HEMOGLOBIN GLYCOSYLATED A1C: CPT | Performed by: INTERNAL MEDICINE

## 2024-07-18 ENCOUNTER — OFFICE VISIT (OUTPATIENT)
Dept: PRIMARY CARE CLINIC | Facility: CLINIC | Age: 59
End: 2024-07-18
Payer: COMMERCIAL

## 2024-07-18 ENCOUNTER — HOSPITAL ENCOUNTER (OUTPATIENT)
Dept: RADIOLOGY | Facility: HOSPITAL | Age: 59
Discharge: HOME | End: 2024-07-18
Attending: INTERNAL MEDICINE
Payer: COMMERCIAL

## 2024-07-18 VITALS
BODY MASS INDEX: 40.52 KG/M2 | OXYGEN SATURATION: 97 % | HEIGHT: 60 IN | WEIGHT: 206.38 LBS | DIASTOLIC BLOOD PRESSURE: 76 MMHG | SYSTOLIC BLOOD PRESSURE: 136 MMHG | HEART RATE: 53 BPM

## 2024-07-18 VITALS — WEIGHT: 206 LBS | BODY MASS INDEX: 40.44 KG/M2 | HEIGHT: 60 IN

## 2024-07-18 DIAGNOSIS — G47.33 OSA ON CPAP: ICD-10-CM

## 2024-07-18 DIAGNOSIS — E03.8 SECONDARY HYPOTHYROIDISM: ICD-10-CM

## 2024-07-18 DIAGNOSIS — Z00.00 WELLNESS EXAMINATION: Primary | ICD-10-CM

## 2024-07-18 DIAGNOSIS — E11.9 TYPE 2 DIABETES MELLITUS WITHOUT COMPLICATION, WITHOUT LONG-TERM CURRENT USE OF INSULIN: ICD-10-CM

## 2024-07-18 DIAGNOSIS — G56.21 ULNAR NEUROPATHY AT ELBOW, RIGHT: ICD-10-CM

## 2024-07-18 DIAGNOSIS — I10 HYPERTENSION, UNSPECIFIED TYPE: ICD-10-CM

## 2024-07-18 DIAGNOSIS — Z12.31 ENCOUNTER FOR SCREENING MAMMOGRAM FOR MALIGNANT NEOPLASM OF BREAST: ICD-10-CM

## 2024-07-18 PROCEDURE — 3075F SYST BP GE 130 - 139MM HG: CPT | Mod: CPTII,S$GLB,, | Performed by: INTERNAL MEDICINE

## 2024-07-18 PROCEDURE — 3066F NEPHROPATHY DOC TX: CPT | Mod: CPTII,S$GLB,, | Performed by: INTERNAL MEDICINE

## 2024-07-18 PROCEDURE — 3008F BODY MASS INDEX DOCD: CPT | Mod: CPTII,S$GLB,, | Performed by: INTERNAL MEDICINE

## 2024-07-18 PROCEDURE — 99396 PREV VISIT EST AGE 40-64: CPT | Mod: S$GLB,,, | Performed by: INTERNAL MEDICINE

## 2024-07-18 PROCEDURE — 77063 BREAST TOMOSYNTHESIS BI: CPT | Mod: TC

## 2024-07-18 PROCEDURE — 77067 SCR MAMMO BI INCL CAD: CPT | Mod: 26,,, | Performed by: RADIOLOGY

## 2024-07-18 PROCEDURE — 3061F NEG MICROALBUMINURIA REV: CPT | Mod: CPTII,S$GLB,, | Performed by: INTERNAL MEDICINE

## 2024-07-18 PROCEDURE — 1159F MED LIST DOCD IN RCRD: CPT | Mod: CPTII,S$GLB,, | Performed by: INTERNAL MEDICINE

## 2024-07-18 PROCEDURE — 99214 OFFICE O/P EST MOD 30 MIN: CPT | Mod: 25,S$GLB,, | Performed by: INTERNAL MEDICINE

## 2024-07-18 PROCEDURE — 99999 PR PBB SHADOW E&M-EST. PATIENT-LVL III: CPT | Mod: PBBFAC,,, | Performed by: INTERNAL MEDICINE

## 2024-07-18 PROCEDURE — 3078F DIAST BP <80 MM HG: CPT | Mod: CPTII,S$GLB,, | Performed by: INTERNAL MEDICINE

## 2024-07-18 PROCEDURE — 3044F HG A1C LEVEL LT 7.0%: CPT | Mod: CPTII,S$GLB,, | Performed by: INTERNAL MEDICINE

## 2024-07-18 PROCEDURE — 77063 BREAST TOMOSYNTHESIS BI: CPT | Mod: 26,,, | Performed by: RADIOLOGY

## 2024-07-18 RX ORDER — MELOXICAM 7.5 MG/1
7.5 TABLET ORAL DAILY
Qty: 14 TABLET | Refills: 1 | Status: SHIPPED | OUTPATIENT
Start: 2024-07-18

## 2024-07-18 NOTE — PROGRESS NOTES
[facility] Clinic Note    Chief Complaint      Chief Complaint   Patient presents with    Annual Exam     History of Present Illness      Matt Lomax is a 58 y.o. female who presents today for chief complaint annual wellness .     HPI   Annual feels well   [place] this week Vit D 25 and A1c 5.5 otherwise normal  Vit D def   ISAIAH started on zoloft and prn ativan 2 months ago. Her   in dec and her son was murdered in April. She is using daily ativan. She is doing ok mood wise. She is seeing a therapist   Ulnar neuropathy on the right and palmar numbness on the left - declines hand surgert but wulll do brace and nsaids on the right , no elbow pain   DM2 last a1c 5.7, due now, diet controlled - post pituitary sx, eye exam Dr Bender in the past. She had DKA post op   HTN Prescribed triamterene-hydrochlorothiazide 37.5-25 mg by mouth daily and verapamil 240 mg by mouth daily. 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 With . Génesis AI, DM  Pit Adenoma/prolactinima  she sees Dr Quigley in Endo: , possible prolactinoma, ~ in  and started on cabergoline at that time due to elevated prolactin, was Monitored with MRI with increase in size over time so she was referred to  and she had surgery in 2014.  Prolactin has been normal since time of surgery off cabergoline.  Lat MRI 3.21. She had post op CSF leak    HoTH on levothyroxine 75mcg  Adrenal insufficiency: she sees Dr Quigley in Endo:Takes hydrocortisone 10 mg by mouth with breakfast and half a tablet with dinner, has not discussed stress dose     She does not need ambien       Pap: 11.22  MM scheduled   Colonoscopy:   scope   Tobacco: never smoker passive exposure +  Other MDs:  I personally reviewed all past medical, surgical, social and family history.    This note documents patient's acute complaint of  addressed within their primary visit for annual preventative visit.    HPI: New Problem today. Pt complains of elbow pain .    ROS: pain and neuropathy     Physical Exam:nttp no edema nl ROM    A/P: tendonitis brace and nsaids       Active Problem List with Overview Notes    Diagnosis Date Noted    Type 2 diabetes mellitus without complication, without long-term current use of insulin 2020     At goal no change continue diet control       Insomnia 2019    Hyperlipidemia 2018    Secondary hypothyroidism 2015    H/O secondary hypogonadism 2015    Secondary adrenal insufficiency 10/21/2014    Pituitary adenoma 2014    JUAN on CPAP 2014    Obesity, morbid, BMI 40.0-49.9 2014    Hypertension 2013       Health Maintenance   Topic Date Due    Shingles Vaccine (1 of 2) Never done    Mammogram  2024    Hemoglobin A1c  2025    Lipid Panel  2025    Low Dose Statin  2025    TETANUS VACCINE  2026    Colorectal Cancer Screening  2029    Hepatitis C Screening  Completed    Foot Exam  Discontinued    Eye Exam  Discontinued       Past Medical History:   Diagnosis Date    HTN (hypertension)     Hyperprolactinemia     Morbid obesity     JUAN (obstructive sleep apnea)     Pituitary adenoma     Secondary hypothyroidism 2015    Type II or unspecified type diabetes mellitus without mention of complication, uncontrolled     Vitamin D deficiency disease        Past Surgical History:   Procedure Laterality Date    BRAIN SURGERY  2015    Breast Reduction      BREAST SURGERY  2010     SECTION      x 3    COLONOSCOPY N/A 2017    Procedure: COLONOSCOPY;  Surgeon: Ciro Rebolledo MD;  Location: [facility] ([address] [place] [address]);  Service: Endoscopy;   Laterality: N/A;    COLONOSCOPY N/A 05/07/2024    Procedure: COLONOSCOPY;  Surgeon: Gisela Jackson MD;  Location: [facility] ENDOSCOPY;  Service: Endoscopy;  Laterality: N/A;  referral: Dr. Schneider / inst. to patient portal. peg prep. Tbou  1/29 - pt resched to 5/7 due to illness SW  4/30/24- lvm and portal msg for pc. DBM  4/30/24- pt returned call, pc complete. DBM    TOTAL REDUCTION MAMMOPLASTY Bilateral 2010    TRANSPHENOIDAL PITUITARY RESECTION  07/21/2014    TUBAL LIGATION         family history includes Diabetes in her maternal aunt and mother; Heart disease in her mother; Hypertension in her father and mother; No Known Problems in her brother, brother, daughter, daughter, maternal grandfather, maternal grandmother, maternal uncle, paternal aunt, paternal grandfather, paternal grandmother, paternal uncle, sister, and son.    Social History     Tobacco Use    Smoking status: Never     Passive exposure: Current    Smokeless tobacco: Never   Substance Use Topics    Alcohol use: No    Drug use: No       Review of Systems   Constitutional:  Negative for chills, fever, malaise/fatigue and weight loss.   Respiratory:  Negative for cough, sputum production, shortness of breath and wheezing.    Cardiovascular:  Negative for chest pain, palpitations, orthopnea and leg swelling.   Gastrointestinal:  Negative for constipation, diarrhea, nausea and vomiting.   Genitourinary:  Negative for dysuria, frequency, hematuria and urgency.        Outpatient Encounter Medications as of 7/18/2024   Medication Sig Note Dispense Refill    hydrocortisone (CORTEF) 10 MG Tab TAKE 1 TABLET BY MOUTH ONCE DAILY WITH BREAKFAST AND  ONE-HALF  TABLET  ONCE  DAILY  WITH  DINNER  45 tablet 11    levothyroxine (SYNTHROID) 75 MCG tablet TAKE 1 TABLET BY MOUTH BEFORE BREAKFAST  90 tablet 3    LORazepam (ATIVAN) 0.5 MG tablet TAKE 1 TABLET BY MOUTH EVERY 12 HOURS AS NEEDED FOR ANXIETY  45 tablet 0    multivitamin (THERAGRAN) per tablet Take 1 tablet  by mouth every morning.  7/14/2014: Hold the morning of surgery.       pravastatin (PRAVACHOL) 20 MG tablet Take 1 tablet (20 mg total) by mouth once daily.  90 tablet 3    sertraline (ZOLOFT) 25 MG tablet Take 1 tablet (25 mg total) by mouth once daily.  90 tablet 1    triamcinolone acetonide 0.1% (KENALOG) 0.1 % ointment Apply topically 2 (two) times daily.  30 g 1    triamterene-hydrochlorothiazide 37.5-25 mg (DYAZIDE) 37.5-25 mg per capsule Take 1 capsule by mouth once daily.  90 capsule 3    verapamiL (CALAN-SR) 240 MG CR tablet Take 1 tablet (240 mg total) by mouth once daily.  90 tablet 3    zolpidem (AMBIEN) 5 MG Tab Take 1 tablet (5 mg total) by mouth nightly as needed (Insomnia.).  30 tablet 2    meloxicam (MOBIC) 7.5 MG tablet Take 1 tablet (7.5 mg total) by mouth once daily.  14 tablet 1    [DISCONTINUED] LORazepam (ATIVAN) 0.5 MG tablet Take 1 tablet (0.5 mg total) by mouth every 12 (twelve) hours as needed for Anxiety.  45 tablet 0     No facility-administered encounter medications on file as of 7/18/2024.        Review of patient's allergies indicates:   Allergen Reactions    Lisinopril Hives           Physical Exam      Vital Signs  Pulse: (!) 53  SpO2: 97 %  BP: 136/76  BP Location: Left arm  Patient Position: Sitting  Height and Weight  Height: 5' (152.4 cm)  Weight: 93.6 kg (206 lb 5.6 oz)  BSA (Calculated - sq m): 1.99 sq meters  BMI (Calculated): 40.3  Weight in (lb) to have BMI = 25: 127.7]    Physical Exam  Vitals reviewed.   Constitutional:       General: She is not in acute distress.     Appearance: She is well-developed. She is not diaphoretic.   HENT:      Head: Normocephalic and atraumatic.      Right Ear: External ear normal.      Left Ear: External ear normal.      Nose: Nose normal.   Eyes:      General:         Right eye: No discharge.         Left eye: No discharge.      Conjunctiva/sclera: Conjunctivae normal.   Cardiovascular:      Rate and Rhythm: Normal rate and regular rhythm.  "     Heart sounds: Normal heart sounds.   Pulmonary:      Effort: Pulmonary effort is normal. No respiratory distress.      Breath sounds: Normal breath sounds.   Musculoskeletal:         General: Normal range of motion.      Cervical back: Normal range of motion.   Skin:     Coloration: Skin is not pale.      Findings: No rash.   Neurological:      Mental Status: She is alert and oriented to person, place, and time.   Psychiatric:         Behavior: Behavior normal.         Thought Content: Thought content normal.          Laboratory:  CBC:  Recent Labs   Lab Result Units 07/16/24  0840   WBC K/uL 5.90   RBC M/uL 4.40   Hemoglobin g/dL 13.8   Hematocrit % 40.0   Platelets K/uL 268   MCV fL 91   MCH pg 31.4*   MCHC g/dL 34.5     CMP:  Recent Labs   Lab Result Units 07/16/24  0840   Glucose mg/dL 95   Calcium mg/dL 9.4   Albumin g/dL 3.7   Total Protein g/dL 7.0   Sodium mmol/L 140   Potassium mmol/L 3.5   CO2 mmol/L 26   Chloride mmol/L 107   BUN mg/dL 10   Alkaline Phosphatase U/L 81   ALT U/L 13   AST U/L 25   Total Bilirubin mg/dL 0.6     URINALYSIS:  No results for input(s): "COLORU", "CLARITYU", "SPECGRAV", "PHUR", "PROTEINUA", "GLUCOSEU", "BILIRUBINCON", "BLOODU", "WBCU", "RBCU", "BACTERIA", "MUCUS", "NITRITE", "LEUKOCYTESUR", "UROBILINOGEN", "HYALINECASTS" in the last 2160 hours.   LIPIDS:  Recent Labs   Lab Result Units 07/16/24  0840   HDL mg/dL 33*   Cholesterol mg/dL 153   Triglycerides mg/dL 136   LDL Cholesterol mg/dL 92.8   HDL/Cholesterol Ratio % 21.6   Non-HDL Cholesterol mg/dL 120   Total Cholesterol/HDL Ratio  4.6     TSH:  No results for input(s): "TSH" in the last 2160 hours.  A1C:  Recent Labs   Lab Result Units 07/16/24  0840   Hemoglobin A1C % 5.5       Radiology:      Assessment/Plan     Matt Lomax is a 58 y.o.female with:    1. Wellness examination    2. Ulnar neuropathy at elbow, right  -     meloxicam (MOBIC) 7.5 MG tablet; Take 1 tablet (7.5 mg total) by mouth once daily.  " Dispense: 14 tablet; Refill: 1    3. Hypertension, unspecified type  Assessment & Plan:  At goal      4. Secondary hypothyroidism  Assessment & Plan:  Euthyroid       5. JUAN on CPAP  Assessment & Plan:  Discussed compliance      6. Type 2 diabetes mellitus without complication, without long-term current use of insulin  Overview:  At goal no change continue diet control     Assessment & Plan:  At goal no change            Use of the [employer] Patient Portal discussed and encouraged during today's visit  -Continue current medications and maintain follow up with specialists.  Return to clinic in .  Future Appointments   Date Time Provider Department Center   1/21/2025  1:00 PM Eloise Schneider MD [facility]       Eloise Schneider MD  7/18/2024 8:33 AM    Primary Care Internal Medicine

## 2024-07-18 NOTE — PATIENT INSTRUCTIONS
Start on caltrate calcium and vit d supplement     Zoloft daily and ativan as needed for anxiety or sleep

## 2024-08-16 ENCOUNTER — OCCUPATIONAL HEALTH (OUTPATIENT)
Dept: URGENT CARE | Facility: CLINIC | Age: 59
End: 2024-08-16

## 2024-08-16 VITALS — WEIGHT: 208 LBS | BODY MASS INDEX: 40.62 KG/M2

## 2024-08-16 DIAGNOSIS — Z02.89 ENCOUNTER FOR EXAMINATION REQUIRED BY DEPARTMENT OF TRANSPORTATION (DOT): Primary | ICD-10-CM

## 2024-10-22 ENCOUNTER — OFFICE VISIT (OUTPATIENT)
Dept: URGENT CARE | Facility: CLINIC | Age: 59
End: 2024-10-22
Payer: COMMERCIAL

## 2024-10-22 VITALS
TEMPERATURE: 98 F | SYSTOLIC BLOOD PRESSURE: 136 MMHG | HEIGHT: 60 IN | RESPIRATION RATE: 19 BRPM | BODY MASS INDEX: 40.8 KG/M2 | WEIGHT: 207.81 LBS | HEART RATE: 52 BPM | OXYGEN SATURATION: 96 % | DIASTOLIC BLOOD PRESSURE: 64 MMHG

## 2024-10-22 DIAGNOSIS — J06.9 BACTERIAL URI: Primary | ICD-10-CM

## 2024-10-22 DIAGNOSIS — E78.5 HYPERLIPIDEMIA, UNSPECIFIED HYPERLIPIDEMIA TYPE: ICD-10-CM

## 2024-10-22 DIAGNOSIS — B96.89 BACTERIAL URI: Primary | ICD-10-CM

## 2024-10-22 DIAGNOSIS — E11.9 TYPE 2 DIABETES MELLITUS WITHOUT COMPLICATION, WITHOUT LONG-TERM CURRENT USE OF INSULIN: ICD-10-CM

## 2024-10-22 DIAGNOSIS — I10 HYPERTENSION, UNSPECIFIED TYPE: ICD-10-CM

## 2024-10-22 PROCEDURE — 99213 OFFICE O/P EST LOW 20 MIN: CPT | Mod: S$GLB,,, | Performed by: PHYSICIAN ASSISTANT

## 2024-10-22 RX ORDER — AZITHROMYCIN 250 MG/1
TABLET, FILM COATED ORAL
Qty: 6 TABLET | Refills: 0 | Status: SHIPPED | OUTPATIENT
Start: 2024-10-22 | End: 2024-10-27

## 2024-10-22 RX ORDER — PROMETHAZINE HYDROCHLORIDE AND DEXTROMETHORPHAN HYDROBROMIDE 6.25; 15 MG/5ML; MG/5ML
5 SYRUP ORAL EVERY 4 HOURS PRN
Qty: 120 ML | Refills: 0 | Status: SHIPPED | OUTPATIENT
Start: 2024-10-22 | End: 2024-11-01

## 2024-10-22 NOTE — PATIENT INSTRUCTIONS

## 2024-10-22 NOTE — PROGRESS NOTES
Subjective:      Patient ID: Matt Lomax is a 58 y.o. female.    Vitals:  height is 5' (1.524 m) and weight is 94.3 kg (207 lb 12.5 oz). Her oral temperature is 98.1 °F (36.7 °C). Her blood pressure is 136/64 and her pulse is 52 (abnormal). Her respiration is 19 and oxygen saturation is 96%.     Chief Complaint: Cough    Pt states she had an onset of a productive cough 3-4 days ago.  C/o green/brown sputum, otalgia, ear congestion, sore throat.  Took Nyquil-- no relief.    Patient provider note starts here:  Patient presents with complaints of having a cough productive of green and brown sputum x3-4 days now. Also tastes blood at times while coughing. Endorses associated right sided ear pain, nasal congestion and sore throat as well. Denies chest pain, SOB or fevers. She does not smoke tobacco. Took Nyquil without significant relief.     Cough  This is a new problem. The current episode started in the past 7 days. The problem has been rapidly improving. The problem occurs constantly. The cough is Productive of brown sputum and productive of purulent sputum. Associated symptoms include ear congestion, ear pain, postnasal drip and a sore throat. Pertinent negatives include no chest pain, fever, headaches, nasal congestion, shortness of breath or wheezing. Nothing aggravates the symptoms. Treatments tried: Nyquil. The treatment provided no relief.       Constitution: Negative for fever.   HENT:  Positive for ear pain, congestion, postnasal drip and sore throat.    Neck: Negative for neck pain.   Cardiovascular:  Negative for chest pain, palpitations and sob on exertion.   Respiratory:  Positive for cough and sputum production. Negative for chest tightness, shortness of breath and wheezing.    Gastrointestinal:  Negative for abdominal pain, vomiting and diarrhea.   Skin:  Negative for color change and wound.   Neurological:  Negative for headaches, numbness and tingling.      Objective:     Physical Exam    Constitutional: She is oriented to person, place, and time. She appears well-developed. She is cooperative.  Non-toxic appearance. She does not appear ill. No distress.   HENT:   Head: Normocephalic and atraumatic.   Ears:   Right Ear: Hearing, tympanic membrane, external ear and ear canal normal.   Left Ear: Hearing, tympanic membrane, external ear and ear canal normal.   Nose: Congestion present. No mucosal edema, rhinorrhea or nasal deformity. No epistaxis. Right sinus exhibits no maxillary sinus tenderness and no frontal sinus tenderness. Left sinus exhibits no maxillary sinus tenderness and no frontal sinus tenderness.   Mouth/Throat: Uvula is midline, oropharynx is clear and moist and mucous membranes are normal. No trismus in the jaw. Normal dentition. No uvula swelling. No oropharyngeal exudate, posterior oropharyngeal edema or posterior oropharyngeal erythema.   Eyes: Conjunctivae and lids are normal. No scleral icterus.   Neck: Trachea normal and phonation normal. Neck supple. No edema present. No erythema present. No neck rigidity present.   Cardiovascular: Normal rate, regular rhythm, normal heart sounds and normal pulses.   Pulmonary/Chest: Effort normal and breath sounds normal. No respiratory distress. She has no decreased breath sounds. She has no wheezes. She has no rhonchi.   Abdominal: Normal appearance.   Musculoskeletal: Normal range of motion.         General: No deformity. Normal range of motion.   Neurological: She is alert and oriented to person, place, and time. She exhibits normal muscle tone. Coordination normal.   Skin: Skin is warm, dry, intact, not diaphoretic and not pale.   Psychiatric: Her speech is normal and behavior is normal. Judgment and thought content normal.   Nursing note and vitals reviewed.      Assessment:     1. Bacterial URI    2. Hypertension, unspecified type    3. Hyperlipidemia, unspecified hyperlipidemia type    4. Type 2 diabetes mellitus without complication,  without long-term current use of insulin        Plan:       Bacterial URI  -     azithromycin (Z-KRISTOPHER) 250 MG tablet; Take 2 tablets by mouth on day 1; Take 1 tablet by mouth on days 2-5  Dispense: 6 tablet; Refill: 0    Hypertension, unspecified type    Hyperlipidemia, unspecified hyperlipidemia type    Type 2 diabetes mellitus without complication, without long-term current use of insulin    Other orders  -     promethazine-dextromethorphan (PROMETHAZINE-DM) 6.25-15 mg/5 mL Syrp; Take 5 mLs by mouth every 4 (four) hours as needed (Cough).  Dispense: 120 mL; Refill: 0          Medical Decision Making:   History:   Old Medical Records: I decided to obtain old medical records.  Old Records Summarized: records from clinic visits.  Urgent Care Management:  A. Problem List:   -Acute: Bacterial URI    -Chronic: DMII, HTN, HLD   B. Differential diagnosis: viral vs bacterial URI, pharyngitis, otitis, COVID 19, influenza, pneumonia  C. Diagnostic Testing Ordered: None  D. Diagnostic Testing Considered:  E. Independent Historians: None  F. Urgent Care Midlevel Independent Results Interpretation:   G. Radiology:  H. Review of Previous Medical Records:  I. Home Medications Reviewed  J. Social Determinants of Health considered  K. Medical Decision Making and Disposition: Patient presents with complaints of URI like symptoms including a cough which is productive of purulent sputum. On exam, she is afebrile and nontoxic appearing. Lungs CTAB, vitals stable. I am treating with abx due to the purulent sputum and covering for atypical bacteria. Advised follow-up with PCP and ED precautions. She verbalized understanding and agreed with plan.          Patient Instructions   Thank you for choosing [facility]!     Our goal in the Urgent Care is to always provide outstanding medical care. You may receive a survey by mail or e-mail in the next week regarding your experience today. We would greatly appreciate you completing and  returning the survey. Your feedback provides us with a way to recognize our staff who provide very good care, and it helps us learn how to improve when your experience was below our aspiration of excellence.       We appreciate you trusting us with your medical care. We hope you feel better soon. We will be happy to take care of you for all of your future medical needs.    You must understand that you've received an Urgent Care treatment only and that you may be released before all your medical problems are known or treated. You, the patient, will arrange for follow up care as instructed.      Follow up with your PCP or specialty clinic as instructed in the next 2-3 days if not improved or as needed. You can call [phone] to schedule an appointment with appropriate provider.      If you condition worsens, we recommend that you receive another evaluation at the emergency room immediately or contact your primary medical clinic's after hours call service to discuss your concerns.      Please return here or go to the Emergency Department for any concerns or worsening condition.

## 2024-11-11 ENCOUNTER — OFFICE VISIT (OUTPATIENT)
Dept: URGENT CARE | Facility: CLINIC | Age: 59
End: 2024-11-11
Payer: COMMERCIAL

## 2024-11-11 VITALS
SYSTOLIC BLOOD PRESSURE: 149 MMHG | HEIGHT: 60 IN | WEIGHT: 207 LBS | HEART RATE: 89 BPM | BODY MASS INDEX: 40.64 KG/M2 | OXYGEN SATURATION: 97 % | RESPIRATION RATE: 20 BRPM | DIASTOLIC BLOOD PRESSURE: 87 MMHG | TEMPERATURE: 98 F

## 2024-11-11 DIAGNOSIS — Z20.822 ENCOUNTER FOR LABORATORY TESTING FOR COVID-19 VIRUS: ICD-10-CM

## 2024-11-11 DIAGNOSIS — R09.81 NASAL CONGESTION: Primary | ICD-10-CM

## 2024-11-11 DIAGNOSIS — B96.89 BACTERIAL SINUSITIS: ICD-10-CM

## 2024-11-11 DIAGNOSIS — J32.9 BACTERIAL SINUSITIS: ICD-10-CM

## 2024-11-11 LAB
CTP QC/QA: YES
SARS-COV-2 AG RESP QL IA.RAPID: NEGATIVE

## 2024-11-11 PROCEDURE — 99213 OFFICE O/P EST LOW 20 MIN: CPT | Mod: S$GLB,,, | Performed by: PHYSICIAN ASSISTANT

## 2024-11-11 PROCEDURE — 87811 SARS-COV-2 COVID19 W/OPTIC: CPT | Mod: QW,S$GLB,, | Performed by: PHYSICIAN ASSISTANT

## 2024-11-11 RX ORDER — PREDNISONE 50 MG/1
50 TABLET ORAL DAILY
Qty: 2 TABLET | Refills: 0 | Status: SHIPPED | OUTPATIENT
Start: 2024-11-11 | End: 2024-11-13

## 2024-11-11 RX ORDER — AZELASTINE 1 MG/ML
1 SPRAY, METERED NASAL 2 TIMES DAILY
Qty: 30 ML | Refills: 0 | Status: SHIPPED | OUTPATIENT
Start: 2024-11-11 | End: 2025-11-11

## 2024-11-11 RX ORDER — VITAMIN A 3000 MCG
2 CAPSULE ORAL
Qty: 60 ML | Refills: 12 | Status: SHIPPED | OUTPATIENT
Start: 2024-11-11

## 2024-11-11 RX ORDER — AMOXICILLIN AND CLAVULANATE POTASSIUM 875; 125 MG/1; MG/1
1 TABLET, FILM COATED ORAL EVERY 12 HOURS
Qty: 20 TABLET | Refills: 0 | Status: SHIPPED | OUTPATIENT
Start: 2024-11-11

## 2024-11-11 RX ORDER — FLUTICASONE PROPIONATE 50 MCG
1 SPRAY, SUSPENSION (ML) NASAL DAILY
Qty: 16 G | Refills: 3 | Status: SHIPPED | OUTPATIENT
Start: 2024-11-11

## 2024-11-11 NOTE — LETTER
November 11, 2024      [facility] [place]  [address] [address], [address]  [place] [address]  Phone: [phone]  Fax: [phone]       Patient: Matt Lomax   YOB: 1965  Date of Visit: 11/11/2024    To Whom It May Concern:    Elroy Lomax  was at [facility] on 11/11/2024. The patient  has been out with illness and is cleared to return to work on 11/12/2024 with no restrictions. If you have any questions or concerns, or if I can be of further assistance, please do not hesitate to contact me.    Sincerely,    Terrance Alcaraz PA

## 2024-11-12 NOTE — PROGRESS NOTES
Subjective:      Patient ID: Matt Lomax is a 58 y.o. female.    Vitals:  height is 5' (1.524 m) and weight is 93.9 kg (207 lb). Her oral temperature is 98.4 °F (36.9 °C). Her blood pressure is 149/87 (abnormal) and her pulse is 89. Her respiration is 20 and oxygen saturation is 97%.     Chief Complaint: Sinus Problem    Pt complains of sore throat, nasal congestion and cough. Patient was seen on October 27th treated for upper respiratory tract infection with Z-Angelo got better for several days and got worse again.  Patient complains of sinus pressure and pain with thick nasal  green discharge.  No fever chills nausea vomiting diarrhea.    Sinus Problem  This is a new problem. The current episode started in the past 7 days. The problem is unchanged. Associated symptoms include congestion, coughing, headaches and sinus pressure. Past treatments include oral decongestants. The treatment provided mild relief.       HENT:  Positive for congestion, postnasal drip, sinus pain and sinus pressure.    Respiratory:  Positive for cough.    Skin:  Negative for erythema.   Neurological:  Positive for headaches.      Objective:     Physical Exam   Constitutional: She is oriented to person, place, and time. She appears well-developed. She is cooperative.  Non-toxic appearance. She does not appear ill. No distress.   HENT:   Head: Normocephalic and atraumatic.   Ears:   Right Ear: Hearing, tympanic membrane, external ear and ear canal normal.   Left Ear: Hearing, tympanic membrane, external ear and ear canal normal.   Nose: Nose normal. No mucosal edema, rhinorrhea, nasal deformity or congestion. No epistaxis. Right sinus exhibits no maxillary sinus tenderness and no frontal sinus tenderness. Left sinus exhibits no maxillary sinus tenderness and no frontal sinus tenderness.   Mouth/Throat: Uvula is midline, oropharynx is clear and moist and mucous membranes are normal. No trismus in the jaw. Normal dentition. No uvula  swelling. No oropharyngeal exudate, posterior oropharyngeal edema or posterior oropharyngeal erythema.   Eyes: Conjunctivae, EOM and lids are normal. Pupils are equal, round, and reactive to light. Right eye exhibits no discharge. Left eye exhibits no discharge. No scleral icterus. Extraocular movement intact   Neck: Trachea normal and phonation normal. Neck supple. No JVD present. No tracheal deviation present. No thyromegaly present. No edema present. No erythema present. No neck rigidity present.   Cardiovascular: Normal rate, regular rhythm, normal heart sounds and normal pulses.   No murmur heard.Exam reveals no gallop and no friction rub.   Pulmonary/Chest: Effort normal and breath sounds normal. No stridor. No respiratory distress. She has no decreased breath sounds. She has no wheezes. She has no rhonchi. She has no rales. She exhibits no tenderness.   Abdominal: Normal appearance. She exhibits no distension. Soft. There is no abdominal tenderness. There is no rebound and no guarding.   Musculoskeletal: Normal range of motion.         General: No deformity. Normal range of motion.   Neurological: She is alert and oriented to person, place, and time. She exhibits normal muscle tone. Coordination normal.   Skin: Skin is warm, dry, intact, not diaphoretic, not pale and no rash. Capillary refill takes less than 2 seconds. No erythema   Psychiatric: Her speech is normal and behavior is normal. Judgment and thought content normal.   Nursing note and vitals reviewed.    Results for orders placed or performed in visit on 11/11/24   SARS Coronavirus 2 Antigen, POCT Manual Read    Collection Time: 11/11/24  7:05 PM   Result Value Ref Range    SARS Coronavirus 2 Antigen Negative Negative     Acceptable Yes     No results found.     Assessment:     1. Nasal congestion    2. Bacterial sinusitis    3. Encounter for laboratory testing for COVID-19 virus        Plan:       Nasal congestion  -     fluticasone  propionate (FLONASE) 50 mcg/actuation nasal spray; 1 spray (50 mcg total) by Each Nostril route once daily.  Dispense: 16 g; Refill: 3  -     azelastine (ASTELIN) 137 mcg (0.1 %) nasal spray; 1 spray (137 mcg total) by Nasal route 2 (two) times daily.  Dispense: 30 mL; Refill: 0  -     sodium chloride (SALINE NASAL) 0.65 % nasal spray; 2 sprays by Nasal route as needed for Congestion.  Dispense: 60 mL; Refill: 12  -     predniSONE (DELTASONE) 50 MG Tab; Take 1 tablet (50 mg total) by mouth once daily. for 2 days  Dispense: 2 tablet; Refill: 0    Bacterial sinusitis  -     amoxicillin-clavulanate 875-125mg (AUGMENTIN) 875-125 mg per tablet; Take 1 tablet by mouth every 12 (twelve) hours.  Dispense: 20 tablet; Refill: 0    Encounter for laboratory testing for COVID-19 virus  -     SARS Coronavirus 2 Antigen, POCT Manual Read      No follow-ups on file.   Patient Instructions   Patient Education       Upper Respiratory Infection ED   General Information   You came to the Emergency Department (ED) for an upper respiratory infection or URI. A URI can affect your nose, throat, ears, and sinuses. A virus is the cause of almost all URIs and antibiotics will not help you feel better more quickly. The common cold is an example of a viral URI.  URIs are easy to spread from person to person, most often through coughing or sneezing. A URI will almost always get better in a week or two without any treatment.  What care is needed at home?   Call your regular doctor to let them know you were in the ED. Make a follow-up appointment if you were told to.  If you smoke, try to quit. Your doctor or nurse can help.  Drink lots of fluids like water, juice, or broth. This will help replace any fluids lost if you have a runny nose or fever. Warm tea or soup can help soothe a sore throat.  If the air in your home feels dry, use a cool mist humidifier. This can help a stuffy nose and make it easier to breathe.  You can also use saline nose  drops to relieve stuffiness.  If you decide to take over-the-counter cough or cold medicines, follow the directions on the label carefully. Be sure you do not take more than 1 medicine that contains acetaminophen. Also, if you have a heart problem or high blood pressure, check with your doctor before you take any of these medicines.  Wash your hands often. Cough or sneeze into a tissue or your elbow instead of your hands. This will help keep others healthy.  When do I need to get emergency help?   Return to the ED if:   You have trouble breathing when talking or sitting still.  When do I need to call the doctor?   You have a fever of 100.4°F (38°C) or higher for several days, chills, a very bad sore throat, or ear or sinus pain.  You develop a new fever after several days of feeling the same or improving.  You develop chest pain when you cough.  You have a cough that lasts more than 10 days.  You cough up blood, or the color of the mucus you cough up changes.  You have new or worsening symptoms.  Last Reviewed Date   2020-09-25  Consumer Information Use and Disclaimer   This information is not specific medical advice and does not replace information you receive from your health care provider. This is only a brief summary of general information. It does NOT include all information about conditions, illnesses, injuries, tests, procedures, treatments, therapies, discharge instructions or life-style choices that may apply to you. You must talk with your health care provider for complete information about your health and treatment options. This information should not be used to decide whether or not to accept your health care providers advice, instructions or recommendations. Only your health care provider has the knowledge and training to provide advice that is right for you.  Copyright   Copyright © 2021 [employer] and its affiliates and/or licensors. All rights reserved.

## 2024-12-22 ENCOUNTER — OFFICE VISIT (OUTPATIENT)
Dept: URGENT CARE | Facility: CLINIC | Age: 59
End: 2024-12-22
Payer: COMMERCIAL

## 2024-12-22 VITALS
WEIGHT: 210.31 LBS | SYSTOLIC BLOOD PRESSURE: 115 MMHG | OXYGEN SATURATION: 97 % | DIASTOLIC BLOOD PRESSURE: 80 MMHG | BODY MASS INDEX: 41.29 KG/M2 | TEMPERATURE: 98 F | RESPIRATION RATE: 20 BRPM | HEART RATE: 105 BPM | HEIGHT: 60 IN

## 2024-12-22 DIAGNOSIS — S62.624A CLOSED DISPLACED FRACTURE OF MIDDLE PHALANX OF RIGHT RING FINGER, INITIAL ENCOUNTER: Primary | ICD-10-CM

## 2024-12-22 DIAGNOSIS — M79.644 FINGER PAIN, RIGHT: ICD-10-CM

## 2024-12-22 PROCEDURE — 99214 OFFICE O/P EST MOD 30 MIN: CPT | Mod: S$GLB,,,

## 2024-12-22 PROCEDURE — 73130 X-RAY EXAM OF HAND: CPT | Mod: FY,RT,S$GLB, | Performed by: RADIOLOGY

## 2024-12-22 NOTE — PATIENT INSTRUCTIONS
Please drink plenty of fluids.  Please get plenty of rest.  Please return here or go to the Emergency Department for any concerns or worsening of condition.  If you were prescribed a narcotic medication, do not drive or operate heavy equipment or machinery while taking these medications.  CALL [phone] TO SCHEDULE APPOINTMENT WITH SPECIALIST IF YOU HAVE NOT RECEIVED A CALL BY EITHER TODAY OR BY TOMORROW.   If you were not prescribed an anti-inflammatory medication, and if you do not have any history of stomach/intestinal ulcers, or kidney disease, or are not taking a blood thinner such as Coumadin, Plavix, Pradaxa, Eloquis, or Xaralta for example, it is OK to take over the counter Ibuprofen or Advil or Motrin or Aleve as directed.  Do not take these medications on an empty stomach.  Rest, ice, compression and elevation to the affected joint or limb as needed.  Please follow up with your primary care doctor or specialist as needed.    If you  smoke, please stop smoking.

## 2024-12-22 NOTE — PROGRESS NOTES
Subjective:      Patient ID: Matt Lomax is a 59 y.o. female.    Vitals:  height is 5' (1.524 m) and weight is 95.4 kg (210 lb 5.1 oz). Her oral temperature is 97.8 °F (36.6 °C). Her blood pressure is 115/80 and her pulse is 105. Her respiration is 20 and oxygen saturation is 97%.     Chief Complaint: Finger Injury    59-year-old female past medical history of hypertension, hyperlipidemia, diabetes, hypothyroidism presents to the clinic today with chief complaint of right 4th finger injury and pain. Symptoms started 2 weeks ago she states that she jammed 3 separate fingers but notes that the 4th has not improved.  She also notes that her middle finger has not been able to lie flat either.  Denies any acute injury or trauma to the area. Denies any previous surgeries or injuries on affected area. Patient has iced area.  Pain is minimal, with associated knot and swelling. Patient notes certain movements makes it worse and rest improves symptoms. Patient is right hand dominant. Denies any erythema, abrasions, ecchymosis,  or deformities. Denies numbness or tingling. Denies radiation of pain.  Denies fever, body aches, chest pain, shortness of breath, abdominal pain, N/V/D, or rashes.      Hand Pain   Her dominant hand is their left hand. The injury mechanism was a direct blow. The pain is present in the right hand. The pain does not radiate. The patient is experiencing no pain. The pain has been Constant since the incident. Pertinent negatives include no chest pain. Nothing aggravates the symptoms. She has tried nothing for the symptoms. The treatment provided no relief.       Constitution: Negative for activity change, chills and fever.   HENT:  Negative for ear pain and sore throat.    Neck: Negative for neck pain.   Cardiovascular:  Negative for chest pain.   Eyes:  Negative for eye pain.   Respiratory:  Negative for shortness of breath.    Gastrointestinal:  Negative for abdominal pain, nausea,  vomiting and diarrhea.   Genitourinary:  Negative for dysuria.   Musculoskeletal:  Positive for joint pain and joint swelling. Negative for pain and muscle ache.   Skin:  Negative for rash and erythema.   Allergic/Immunologic: Negative for environmental allergies and seasonal allergies.   Neurological:  Negative for dizziness and headaches.   Psychiatric/Behavioral:  Negative for nervous/anxious. The patient is not nervous/anxious.       Objective:     Physical Exam   Constitutional: She is oriented to person, place, and time. She appears well-developed.   HENT:   Head: Normocephalic and atraumatic. Head is without abrasion, without contusion and without laceration.   Ears:   Right Ear: External ear normal.   Left Ear: External ear normal.   Nose: Nose normal.   Mouth/Throat: Oropharynx is clear and moist and mucous membranes are normal.   Eyes: Conjunctivae, EOM and lids are normal. Pupils are equal, round, and reactive to light.   Neck: Trachea normal and phonation normal. Neck supple.   Cardiovascular: Normal rate.   Pulmonary/Chest: Effort normal. No respiratory distress.   Musculoskeletal: Normal range of motion.         General: Normal range of motion.      Right hand: She exhibits normal capillary refill. Decreased sensation is not present in the ulnar distribution, is not present in the medial distribution and is not present in the radial distribution. Right middle finger: Exhibits swelling and decreased ROM. There is tenderness of the PIP joint. Right ring finger: Exhibits decreased ROM, swelling, tenderness and ecchymosis. There is tenderness of the DIP and PIP joint. Motor /Testing: The patient has normal right wrist strength. Comments: No laxity between 3rd and 4th digit.   Neurological: She is alert and oriented to person, place, and time.   Skin: Skin is warm, dry, intact and no rash. Capillary refill takes less than 2 seconds. No abrasion, No burn, No bruising, No erythema and No ecchymosis    Psychiatric: Her speech is normal and behavior is normal. Judgment and thought content normal.   Nursing note and vitals reviewed.      Assessment:     1. Closed displaced fracture of middle phalanx of right ring finger, initial encounter    2. Finger pain, right      XR HAND COMPLETE 3 VIEW RIGHT    Result Date: 12/22/2024  EXAMINATION: XR HAND COMPLETE 3 VIEW RIGHT CLINICAL HISTORY: Pain in right finger(s) TECHNIQUE: PA, lateral, and oblique views of the right hand were performed. COMPARISON: None FINDINGS: There is the comminuted intra-articular fracture of the middle phalanx of the 4th finger extending to the distal interphalangeal joint. Small linear lucency at the ulnar side of the base of the distal phalanx of the 3rd finger, could represent an age-indeterminate fracture, to correlate clinically. The remainder of the visualized osseous structures appear normal.  There is no advanced degenerative change.  The soft tissues appear normal.     Comminuted nondisplaced fracture of the middle phalanx of the 4th finger with extension to the distal interphalangeal joint. Possible small age-indeterminate avulsion fracture, at the ulnar side of the base of the distal phalanx of the 3rd finger. Electronically signed by: Angela Sullivan MD Date:    12/22/2024 Time:    15:50     Plan:       Closed displaced fracture of middle phalanx of right ring finger, initial encounter  -     Ambulatory referral/consult to Orthopedics  -     SPLINT FOR HOME USE    Finger pain, right  -     XR HAND COMPLETE 3 VIEW RIGHT; Future; Expected date: 12/22/2024

## 2025-01-06 DIAGNOSIS — R52 PAIN: Primary | ICD-10-CM

## 2025-01-07 ENCOUNTER — OFFICE VISIT (OUTPATIENT)
Dept: ORTHOPEDICS | Facility: CLINIC | Age: 60
End: 2025-01-07
Payer: COMMERCIAL

## 2025-01-07 DIAGNOSIS — S62.624A CLOSED DISPLACED FRACTURE OF MIDDLE PHALANX OF RIGHT RING FINGER, INITIAL ENCOUNTER: Primary | ICD-10-CM

## 2025-01-07 PROCEDURE — 99999 PR PBB SHADOW E&M-EST. PATIENT-LVL III: CPT | Mod: PBBFAC,,,

## 2025-01-07 PROCEDURE — 1159F MED LIST DOCD IN RCRD: CPT | Mod: CPTII,S$GLB,,

## 2025-01-07 PROCEDURE — 1160F RVW MEDS BY RX/DR IN RCRD: CPT | Mod: CPTII,S$GLB,,

## 2025-01-07 PROCEDURE — 99204 OFFICE O/P NEW MOD 45 MIN: CPT | Mod: S$GLB,,,

## 2025-01-07 NOTE — PROGRESS NOTES
Subjective:      Patient ID: Matt Lomax is a 59 y.o. female.    Chief Complaint: Pain of the Right Hand      HPI: Matt Lomax is a 59 y.o. right hand dominant female who presents to clinic for follow up of right ring finger middle phalanx fracture sustained approximately 1 month ago when she grabbed for her daughter after she took her keys. Patient went to Urgent Care on 2024 where x-rays confirmed fracture and patient was splinted. Patient has been WBAT.  Associated symptoms include swelling.  Patient has been taking  Ibuprofen  as needed for pain. Pain today 2/10 achy pain. Denies numbness and tingling in her digits. Denies any prior surgeries to hand. At baseline, patient is independent regarding ADLs.     Occupation:     Ambulating: unassisted  Diabetic:  No  Smoking:  She has never smoked.  History of DVT/PE: Negative    PAST MEDICAL HISTORY:    Past Medical History:   Diagnosis Date    HTN (hypertension)     Hyperprolactinemia     Morbid obesity     JUAN (obstructive sleep apnea)     Pituitary adenoma     Secondary hypothyroidism 2015    Type II or unspecified type diabetes mellitus without mention of complication, uncontrolled     Vitamin D deficiency disease      PAST SURGICAL HISTORY:    Past Surgical History:   Procedure Laterality Date    BRAIN SURGERY  2015    Breast Reduction      BREAST SURGERY  2010     SECTION      x 3    COLONOSCOPY N/A 2017    Procedure: COLONOSCOPY;  Surgeon: Ciro Rebolledo MD;  Location: [address]);  Service: Endoscopy;  Laterality: N/A;    COLONOSCOPY N/A 2024    Procedure: COLONOSCOPY;  Surgeon: Gisela Jackson MD;  Location: [facility] ENDOSCOPY;  Service: Endoscopy;  Laterality: N/A;  referral: Dr. Schneider / inst. to patient portal. peg prep. Tbou   - pt resched to  due to illness SW  24- lvm and portal msg for pc. DBM  24- pt returned call, pc complete. DBM    TOTAL  REDUCTION MAMMOPLASTY Bilateral 2010    TRANSPHENOIDAL PITUITARY RESECTION  07/21/2014    TUBAL LIGATION       FAMILY HISTORY:    Family History   Problem Relation Name Age of Onset    Diabetes Mother Davida Echavarria     Hypertension Mother Davida Echavarria     Heart disease Mother Davida Echavarria     Hypertension Father Feliciano Ecahvarria Sr     Diabetes Maternal Aunt P     No Known Problems Brother      No Known Problems Daughter      No Known Problems Son      No Known Problems Brother      No Known Problems Daughter      No Known Problems Sister      No Known Problems Maternal Uncle      No Known Problems Paternal Aunt      No Known Problems Paternal Uncle      No Known Problems Maternal Grandmother      No Known Problems Maternal Grandfather      No Known Problems Paternal Grandmother      No Known Problems Paternal Grandfather      Breast cancer Neg Hx      Colon cancer Neg Hx      Ovarian cancer Neg Hx      Amblyopia Neg Hx      Blindness Neg Hx      Cancer Neg Hx      Cataracts Neg Hx      Glaucoma Neg Hx      Macular degeneration Neg Hx      Retinal detachment Neg Hx      Strabismus Neg Hx      Stroke Neg Hx      Thyroid disease Neg Hx       SOCIAL HISTORY:    Social History     Occupational History    Occupation:      Employer: [employer]   Tobacco Use    Smoking status: Never     Passive exposure: Current    Smokeless tobacco: Never   Substance and Sexual Activity    Alcohol use: No    Drug use: No    Sexual activity: Not Currently     Partners: Male     Birth control/protection: Post-menopausal      MEDICATIONS:   Current Outpatient Medications:     amoxicillin-clavulanate 875-125mg (AUGMENTIN) 875-125 mg per tablet, Take 1 tablet by mouth every 12 (twelve) hours., Disp: 20 tablet, Rfl: 0    azelastine (ASTELIN) 137 mcg (0.1 %) nasal spray, 1 spray (137 mcg total) by Nasal route 2 (two) times daily., Disp: 30 mL, Rfl: 0    fluticasone propionate (FLONASE) 50 mcg/actuation nasal  spray, 1 spray (50 mcg total) by Each Nostril route once daily., Disp: 16 g, Rfl: 3    hydrocortisone (CORTEF) 10 MG Tab, TAKE 1 TABLET BY MOUTH ONCE DAILY WITH BREAKFAST AND  ONE-HALF  TABLET  ONCE  DAILY  WITH  DINNER, Disp: 45 tablet, Rfl: 11    levothyroxine (SYNTHROID) 75 MCG tablet, TAKE 1 TABLET BY MOUTH BEFORE BREAKFAST, Disp: 90 tablet, Rfl: 3    LORazepam (ATIVAN) 0.5 MG tablet, TAKE 1 TABLET BY MOUTH EVERY 12 HOURS AS NEEDED FOR ANXIETY, Disp: 45 tablet, Rfl: 0    meloxicam (MOBIC) 7.5 MG tablet, Take 1 tablet (7.5 mg total) by mouth once daily., Disp: 14 tablet, Rfl: 1    multivitamin (THERAGRAN) per tablet, Take 1 tablet by mouth every morning. , Disp: , Rfl:     pravastatin (PRAVACHOL) 20 MG tablet, Take 1 tablet (20 mg total) by mouth once daily., Disp: 90 tablet, Rfl: 3    sertraline (ZOLOFT) 25 MG tablet, Take 1 tablet (25 mg total) by mouth once daily., Disp: 90 tablet, Rfl: 1    sodium chloride (SALINE NASAL) 0.65 % nasal spray, 2 sprays by Nasal route as needed for Congestion., Disp: 60 mL, Rfl: 12    triamcinolone acetonide 0.1% (KENALOG) 0.1 % ointment, Apply topically 2 (two) times daily., Disp: 30 g, Rfl: 1    triamterene-hydrochlorothiazide 37.5-25 mg (DYAZIDE) 37.5-25 mg per capsule, Take 1 capsule by mouth once daily., Disp: 90 capsule, Rfl: 3    verapamiL (CALAN-SR) 240 MG CR tablet, Take 1 tablet (240 mg total) by mouth once daily., Disp: 90 tablet, Rfl: 3    zolpidem (AMBIEN) 5 MG Tab, Take 1 tablet (5 mg total) by mouth nightly as needed (Insomnia.)., Disp: 30 tablet, Rfl: 2    ALLERGIES:   Review of patient's allergies indicates:   Allergen Reactions    Lisinopril Hives       Review of Systems:  Constitution: Negative for chills, fever and night sweats.   HENT: Negative for congestion and headaches.    Eyes: Negative for blurred vision or vision loss.  Cardiovascular: Negative for chest pain and syncope.   Respiratory: Negative for cough and shortness of breath.    Endocrine: Negative  for polydipsia, polyphagia and polyuria.   Hematologic/Lymphatic: Negative for bleeding problem. Does not bruise/bleed easily.   Skin: Negative for dry skin, itching and rash.   Musculoskeletal: See HPI.   Gastrointestinal: Negative for abdominal pain and bowel incontinence.   Genitourinary: Negative for bladder incontinence and nocturia.   Neurological: Negative for disturbances in coordination, loss of balance and seizures.   Psychiatric/Behavioral: Negative for depression. The patient does not have insomnia.    Allergic/Immunologic: Negative for hives and persistent infections.          Objective:      There were no vitals filed for this visit.    PHYSICAL EXAM:  General: Alert & oriented x3, well-developed and well-nourished, in no acute distress, sitting comfortably in the exam room.  Skin: Warm and dry. Capillary refill less than 2 seconds.   Head: Normocephalic and atraumatic.   Eyes: Sclera appear normal.   Nose: No deformities seen.   Ears: No deformities seen.   Neck: No tracheal deviation present.   Pulmonary/Chest: Breathing unlabored.   Neurological: Alert and oriented to person, place, and time.   Psychiatric: Mood is pleasant and affect appropriate.     RIGHT HAND/WRIST:        Observation/Inspection:     Swelling to the 4th digit.         Palpation:   No tenderness to palpation to bony prominences and soft tissues throughout.        Range of Motion:  Wrist: Full to wrist flexion, extension, radial, and ulnar deviation without pain or difficulty..  Digits: Full to digit MCP and PIP flexion and extension without pain or difficulty.   Slightly limited to DIP flexion due to swelling.          Strength:    strength not tested today.         Neurovascular Exam:  Digits warm and well perfused, brisk capillary refill <3 seconds throughout  NVI motor/LTS to median, radial, and ulnar nerves, radial pulse 2+      Imaging:   X-Rays: 3 views of right hand obtained in the Orthopedic clinic today, and  independently reviewed, show: There is an oblique healing fracture of the middle phalanx of the 4th digit.         Assessment:       1. Closed displaced fracture of middle phalanx of right ring finger, initial encounter        Plan:            I made the decision to obtain old records of the patient including previous notes and imaging. I independently reviewed and interpreted lab results today as well as prior imaging.     I explained the nature of the problem to the patient.     I discussed at length with the patient all the different treatment options available for her right ring finger including analgesics, anti-inflammatories, acetaminophen, bracing, rest, ice, heat, physical therapy, and corticosteroid injections. I explained the potential role of surgery in the treatment of this condition. The patient understands that if non-surgical measures do not adequately control symptoms, surgery will be considered in the future.     Discussed case and imaging with supervising physician, Dr. Mosqueda. Findings include:  Oblique healing fracture of middle phalanx of the 4th digit.     Dr. Mosqueda and I recommend:   Medications:  Continue  OTC Tylenol and/or NSAIDs  as needed for pain management.  Activity Modification:  Avoid use of affected limb. No lifting, gripping, pushing, or pulling with affected limb. Elevate above the heart when possible.   Occupational Therapy:  Ambulatory referral to occupational therapy for eval and treat hand and digit.  DME:  Continue use of aluminum splint as needed for support with work. Advised patient to remove brace otherwise.   Pain Management: Ice compress to the affected area 2-3x a day for 15-20 minutes as needed for pain management.    Follow-Up:  3-4 weeks for follow-up. New right hand x-rays next visit.     All of the patient's questions were answered and the patient will contact us if they have any questions or concerns in the interim.    Stephanie Bossier, PA-C [facility]  Orthopedic Surgery    Medical Dictation software was used during the dictation of portions or the entirety of this medical record.  Phonetic or grammatic errors may exist due to the use of this software. For clarification, refer to the author of the document.

## 2025-01-14 DIAGNOSIS — E78.5 HYPERLIPIDEMIA, UNSPECIFIED HYPERLIPIDEMIA TYPE: ICD-10-CM

## 2025-01-14 DIAGNOSIS — G47.00 INSOMNIA, UNSPECIFIED TYPE: ICD-10-CM

## 2025-01-14 DIAGNOSIS — I10 ESSENTIAL HYPERTENSION: ICD-10-CM

## 2025-01-14 RX ORDER — VERAPAMIL HCL 240 MG
240 TABLET, EXTENDED RELEASE ORAL DAILY
Qty: 90 TABLET | Refills: 0 | Status: SHIPPED | OUTPATIENT
Start: 2025-01-14

## 2025-01-14 RX ORDER — ZOLPIDEM TARTRATE 5 MG/1
5 TABLET ORAL NIGHTLY PRN
Qty: 30 TABLET | Refills: 1 | Status: SHIPPED | OUTPATIENT
Start: 2025-01-14 | End: 2025-01-27

## 2025-01-14 RX ORDER — PRAVASTATIN SODIUM 20 MG/1
20 TABLET ORAL DAILY
Qty: 90 TABLET | Refills: 0 | Status: SHIPPED | OUTPATIENT
Start: 2025-01-14

## 2025-01-14 RX ORDER — TRIAMTERENE AND HYDROCHLOROTHIAZIDE 37.5; 25 MG/1; MG/1
1 CAPSULE ORAL DAILY
Qty: 90 CAPSULE | Refills: 0 | Status: SHIPPED | OUTPATIENT
Start: 2025-01-14

## 2025-01-14 NOTE — TELEPHONE ENCOUNTER
----- Message from Latonia sent at 1/14/2025 10:15 AM CST -----  Contact: [phone] Pt  [email or web address]     Patient is calling for Medical Advice regarding:appt     Patient wants a call back or thru [facility]:Call back     Comments:Pt states she would still be at work on her appt on 01/21 she would like a later appt or reschedule appt pt would like for nurse to call her.     Please advise patient replies from provider may take up to 48 hours.

## 2025-01-14 NOTE — TELEPHONE ENCOUNTER
Patient was a previous patient of Dr. Schneider. She is unable to make her appt on 1/21 with you for her 6 month follow up. She does have an appt in March to get established with you. Are you okay with filling her meds till she sees you in March. I attached the meds that need to be filled.

## 2025-01-14 NOTE — TELEPHONE ENCOUNTER
No care due was identified.  [facility] Embedded Care Due Messages. Reference number: [id number].   1/14/2025 10:47:05 AM CST

## 2025-01-27 ENCOUNTER — OFFICE VISIT (OUTPATIENT)
Dept: URGENT CARE | Facility: CLINIC | Age: 60
End: 2025-01-27
Payer: COMMERCIAL

## 2025-01-27 VITALS
OXYGEN SATURATION: 98 % | SYSTOLIC BLOOD PRESSURE: 122 MMHG | HEART RATE: 62 BPM | WEIGHT: 210 LBS | BODY MASS INDEX: 41.23 KG/M2 | HEIGHT: 60 IN | RESPIRATION RATE: 18 BRPM | DIASTOLIC BLOOD PRESSURE: 92 MMHG | TEMPERATURE: 98 F

## 2025-01-27 DIAGNOSIS — J10.1 INFLUENZA A: Primary | ICD-10-CM

## 2025-01-27 LAB
BILIRUBIN, UA POC OHS: ABNORMAL
BLOOD, UA POC OHS: NEGATIVE
CLARITY, UA POC OHS: ABNORMAL
COLOR, UA POC OHS: ABNORMAL
CTP QC/QA: YES
GLUCOSE, UA POC OHS: NEGATIVE
KETONES, UA POC OHS: NEGATIVE
LEUKOCYTES, UA POC OHS: NEGATIVE
NITRITE, UA POC OHS: NEGATIVE
PH, UA POC OHS: 7
POC MOLECULAR INFLUENZA A AGN: POSITIVE
POC MOLECULAR INFLUENZA B AGN: NEGATIVE
PROTEIN, UA POC OHS: 30
SPECIFIC GRAVITY, UA POC OHS: >=1.03
UROBILINOGEN, UA POC OHS: 4

## 2025-01-27 PROCEDURE — 81003 URINALYSIS AUTO W/O SCOPE: CPT | Mod: QW,S$GLB,, | Performed by: FAMILY MEDICINE

## 2025-01-27 PROCEDURE — 87502 INFLUENZA DNA AMP PROBE: CPT | Mod: QW,S$GLB,, | Performed by: FAMILY MEDICINE

## 2025-01-27 PROCEDURE — 99213 OFFICE O/P EST LOW 20 MIN: CPT | Mod: S$GLB,,, | Performed by: FAMILY MEDICINE

## 2025-01-27 RX ORDER — BENZONATATE 100 MG/1
100 CAPSULE ORAL 3 TIMES DAILY PRN
Qty: 30 CAPSULE | Refills: 1 | Status: SHIPPED | OUTPATIENT
Start: 2025-01-27 | End: 2025-02-06

## 2025-01-27 RX ORDER — OSELTAMIVIR PHOSPHATE 75 MG/1
75 CAPSULE ORAL 2 TIMES DAILY
Qty: 10 CAPSULE | Refills: 0 | Status: SHIPPED | OUTPATIENT
Start: 2025-01-27 | End: 2025-02-01

## 2025-01-27 NOTE — PROGRESS NOTES
Subjective:      Patient ID: Matt Lomax is a 59 y.o. female.    Vitals:  height is 5' (1.524 m) and weight is 95.3 kg (210 lb). Her temperature is 98.3 °F (36.8 °C). Her blood pressure is 122/92 (abnormal) and her pulse is 62. Her respiration is 18 and oxygen saturation is 98%.     Chief Complaint: Cough and Flank Pain    59 year old female presents today with fatigue, weakness, body aches, anorexia, HA, chills, lower back pain, cough. Exposure to the Flu from multiple people. Symptoms started 01/24/2025. Treatments at home includes Flaquita [place] Plus with no relief.     Cough  This is a new problem. The current episode started in the past 7 days. The problem has been unchanged. The cough is Non-productive. Associated symptoms include chills, headaches, myalgias and postnasal drip. She has tried OTC cough suppressant for the symptoms. There is no history of asthma, bronchiectasis, bronchitis, COPD, emphysema, environmental allergies or pneumonia.       Constitution: Positive for chills.   HENT:  Positive for postnasal drip.    Respiratory:  Positive for cough.    Musculoskeletal:  Positive for muscle ache.   Allergic/Immunologic: Negative for environmental allergies.   Neurological:  Positive for headaches.      Objective:     Physical Exam   Constitutional: She does not appear ill. No distress. obesity  HENT:   Head: Normocephalic and atraumatic.   Nose: Congestion present.   Mouth/Throat: Mucous membranes are moist. Posterior oropharyngeal erythema present.   Neck: Neck supple.   Cardiovascular: Normal rate, regular rhythm, normal heart sounds and normal pulses.   Pulmonary/Chest: Effort normal.   Abdominal: Normal appearance.   Neurological: She is alert.   Nursing note and vitals reviewed.    Results for orders placed or performed in visit on 01/27/25   POCT Urinalysis(Instrument)    Collection Time: 01/27/25 12:38 PM   Result Value Ref Range    Color, POC UA Albertina (A) Yellow, Straw, Colorless     Clarity, POC UA Slight Cloudy (A) Clear    Glucose, POC UA Negative Negative    Bilirubin, POC UA Small (A) Negative    Ketones, POC UA Negative Negative    Spec Grav POC UA >=1.030 1.005 - 1.030    Blood, POC UA Negative Negative    pH, POC UA 7.0 5.0 - 8.0    Protein, POC UA 30 (A) Negative    Urobilinogen, POC UA 4.0 (A) <=1.0    Nitrite, POC UA Negative Negative    WBC, POC UA Negative Negative   POCT Influenza A/B MOLECULAR    Collection Time: 01/27/25 12:42 PM   Result Value Ref Range    POC Molecular Influenza A Ag Positive (A) Negative    POC Molecular Influenza B Ag Negative Negative     Acceptable Yes         Assessment:     1. Influenza A        Plan:       Influenza A  -     POCT Urinalysis(Instrument)  -     POCT Influenza A/B MOLECULAR  -     oseltamivir (TAMIFLU) 75 MG capsule; Take 1 capsule (75 mg total) by mouth 2 (two) times daily. for 5 days  Dispense: 10 capsule; Refill: 0  -     benzonatate (TESSALON) 100 MG capsule; Take 1 capsule (100 mg total) by mouth 3 (three) times daily as needed for Cough.  Dispense: 30 capsule; Refill: 1    OTC cough and cold remedies. RTC prn worsening symptoms

## 2025-01-27 NOTE — LETTER
January 27, 2025      [facility]  [address]  [facility] [address]  Phone: [phone]  Fax: [phone]       Patient: Matt Lomax   YOB: 1965  Date of Visit: 01/27/2025    To Whom It May Concern:    Elroy Lomax  was at [facility] on 01/27/2025. The patient may return to work/school on 01/31/2025 with no restrictions. If you have any questions or concerns, or if I can be of further assistance, please do not hesitate to contact me.    Sincerely,          Arnulfo Glover MD

## 2025-02-03 ENCOUNTER — TELEPHONE (OUTPATIENT)
Dept: ORTHOPEDICS | Facility: CLINIC | Age: 60
End: 2025-02-03
Payer: COMMERCIAL

## 2025-02-03 NOTE — TELEPHONE ENCOUNTER
Called pt to confirm that she can come to her appointment tomorrow @ 9AM. She can also stop and get xr @ the [facility] before her appointment. The pt understood. Phone call ended.

## 2025-02-04 ENCOUNTER — OFFICE VISIT (OUTPATIENT)
Dept: ORTHOPEDICS | Facility: CLINIC | Age: 60
End: 2025-02-04
Payer: COMMERCIAL

## 2025-02-04 DIAGNOSIS — S62.624A CLOSED DISPLACED FRACTURE OF MIDDLE PHALANX OF RIGHT RING FINGER, INITIAL ENCOUNTER: Primary | ICD-10-CM

## 2025-02-04 DIAGNOSIS — R52 PAIN: Primary | ICD-10-CM

## 2025-02-04 DIAGNOSIS — R20.0 NUMBNESS AND TINGLING IN RIGHT HAND: ICD-10-CM

## 2025-02-04 DIAGNOSIS — M25.641 DECREASED RANGE OF MOTION OF FINGER OF RIGHT HAND: ICD-10-CM

## 2025-02-04 DIAGNOSIS — R20.2 NUMBNESS AND TINGLING IN RIGHT HAND: ICD-10-CM

## 2025-02-04 PROCEDURE — 99999 PR PBB SHADOW E&M-EST. PATIENT-LVL III: CPT | Mod: PBBFAC,,,

## 2025-02-04 PROCEDURE — 99214 OFFICE O/P EST MOD 30 MIN: CPT | Mod: S$GLB,,,

## 2025-02-04 PROCEDURE — 1159F MED LIST DOCD IN RCRD: CPT | Mod: CPTII,S$GLB,,

## 2025-02-04 PROCEDURE — 1160F RVW MEDS BY RX/DR IN RCRD: CPT | Mod: CPTII,S$GLB,,

## 2025-02-04 NOTE — PROGRESS NOTES
Subjective:      Patient ID: Matt Lomax is a 59 y.o. female.    Chief Complaint: Pain of the Right Hand      HPI: Matt Lomax is a 59 y.o. right hand dominant female who presents to clinic for follow up of right ring finger middle phalanx fracture sustained approximately 2 months ago when she grabbed for her daughter after she took her keys. Patient went to Urgent Care on 12/22/2024 where x-rays confirmed fracture and patient was splinted. Patient has been WBAT.  Associated symptoms include swelling.  Patient has been taking  Ibuprofen  as needed for pain. Pain today 0/10 pain. Denies any prior surgeries to hand. At baseline, patient is independent regarding ADLs.     Patient also reports right hand numbness and tingling for approximately 1 year. She reports a shooting pain from her right pinky finger to her elbow. Reports numbness and tingling on the whole right hand (both volar and dorsal sides), particularly affecting the ulnar side of the hand. Previous treatments include Meloxicam without much relief.     Occupation:     Ambulating: unassisted  Diabetic:  No  Smoking:  She has never smoked.  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    Type II or unspecified type diabetes mellitus without mention of complication, uncontrolled     Vitamin D deficiency disease      MEDICATIONS:   Current Outpatient Medications:     benzonatate (TESSALON) 100 MG capsule, Take 1 capsule (100 mg total) by mouth 3 (three) times daily as needed for Cough., Disp: 30 capsule, Rfl: 1    hydrocortisone (CORTEF) 10 MG Tab, TAKE 1 TABLET BY MOUTH ONCE DAILY WITH BREAKFAST AND  ONE-HALF  TABLET  ONCE  DAILY  WITH  DINNER, Disp: 45 tablet, Rfl: 11    levothyroxine (SYNTHROID) 75 MCG tablet,  TAKE 1 TABLET BY MOUTH BEFORE BREAKFAST, Disp: 90 tablet, Rfl: 3    LORazepam (ATIVAN) 0.5 MG tablet, TAKE 1 TABLET BY MOUTH EVERY 12 HOURS AS NEEDED FOR ANXIETY, Disp: 45 tablet, Rfl: 0    meloxicam (MOBIC) 7.5 MG tablet, Take 1 tablet (7.5 mg total) by mouth once daily., Disp: 14 tablet, Rfl: 1    multivitamin (THERAGRAN) per tablet, Take 1 tablet by mouth every morning. , Disp: , Rfl:     pravastatin (PRAVACHOL) 20 MG tablet, Take 1 tablet (20 mg total) by mouth once daily., Disp: 90 tablet, Rfl: 0    sertraline (ZOLOFT) 25 MG tablet, Take 1 tablet (25 mg total) by mouth once daily., Disp: 90 tablet, Rfl: 1    sodium chloride (SALINE NASAL) 0.65 % nasal spray, 2 sprays by Nasal route as needed for Congestion., Disp: 60 mL, Rfl: 12    triamterene-hydrochlorothiazide 37.5-25 mg (DYAZIDE) 37.5-25 mg per capsule, Take 1 capsule by mouth once daily., Disp: 90 capsule, Rfl: 0    verapamiL (CALAN-SR) 240 MG CR tablet, Take 1 tablet (240 mg total) by mouth once daily., Disp: 90 tablet, Rfl: 0    ALLERGIES:   Review of patient's allergies indicates:   Allergen Reactions    Lisinopril Hives       Review of Systems:  Constitution: Negative for chills, fever and night sweats.   HENT: Negative for congestion and headaches.    Eyes: Negative for blurred vision or vision loss.  Cardiovascular: Negative for chest pain and syncope.   Respiratory: Negative for cough and shortness of breath.    Endocrine: Negative for polydipsia, polyphagia and polyuria.   Hematologic/Lymphatic: Negative for bleeding problem. Does not bruise/bleed easily.   Skin: Negative for dry skin, itching and rash.   Musculoskeletal: See HPI.   Gastrointestinal: Negative for abdominal pain and bowel incontinence.   Genitourinary: Negative for bladder incontinence and nocturia.   Neurological: Negative for disturbances in coordination, loss of balance and seizures.   Psychiatric/Behavioral: Negative for depression. The patient does not have insomnia.     Allergic/Immunologic: Negative for hives and persistent infections.          Objective:      There were no vitals filed for this visit.    PHYSICAL EXAM:  General: Alert & oriented x3, well-developed and well-nourished, in no acute distress, sitting comfortably in the exam room.  Skin: Warm and dry. Capillary refill less than 2 seconds.   Head: Normocephalic and atraumatic.   Eyes: Sclera appear normal.   Nose: No deformities seen.   Ears: No deformities seen.   Neck: No tracheal deviation present.   Pulmonary/Chest: Breathing unlabored.   Neurological: Alert and oriented to person, place, and time.   Psychiatric: Mood is pleasant and affect appropriate.     RIGHT HAND/WRIST:        Observation/Inspection:     Swelling to the 4th digit.         Palpation:   No tenderness to palpation to bony prominences and soft tissues throughout.        Range of Motion:  Wrist: Full to wrist flexion, extension, radial, and ulnar deviation without pain or difficulty..  Digits: Full to digit MCP and PIP flexion and extension without pain or difficulty.    Slightly limited to DIP flexion due to swelling.          Strength:    strength not tested today.         Neurovascular Exam:  Digits warm and well perfused, brisk capillary refill <3 seconds throughout  NVI motor/LTS to median, radial, and ulnar nerves, radial pulse 2+      Imaging:   X-Rays: 3 views of right hand obtained in the Orthopedic clinic today, and independently reviewed, show:  Healing fracture of the middle phalanx of the right 4th finger with no detrimental interval change.         Assessment:       1. Closed displaced fracture of middle phalanx of right ring finger, initial encounter    2. Numbness and tingling in right hand    3. Decreased range of motion of finger of right hand        Plan:       Orders Placed This Encounter    Ambulatory Referral/Consult to Physical Therapy/Occupational Therapy     I made the decision to obtain old records of the patient  including previous notes and imaging. I independently reviewed and interpreted lab results today as well as prior imaging.     I explained the nature of the problem to the patient.     I discussed at length with the patient all the different treatment options available for her right ring finger including analgesics, anti-inflammatories, acetaminophen, bracing, rest, ice, heat, physical therapy, and corticosteroid injections. I explained the potential role of surgery in the treatment of this condition. The patient understands that if non-surgical measures do not adequately control symptoms, surgery will be considered in the future.     With regards to the numbness and tingling, I did explain to the patient it sounds like she may have some cubital tunnel.  We discussed possibility of proceeding with EMG, and future surgical intervention.  Patient states she is not interested in surgery, and will think about further diagnostic testing.    Discussed case and imaging with supervising physician, Dr. Mosqueda. Findings include:  Oblique healing fracture of middle phalanx of the 4th digit.     Dr. Mosqueda and I recommend:   Medications:  Continue  OTC Tylenol and/or NSAIDs  as needed for pain management.  Activity Modification:  Avoid use of affected limb. No lifting, gripping, pushing, or pulling with affected limb. Elevate above the heart when possible.   Occupational Therapy:  New ambulatory referral to occupational therapy for eval and treat hand and digit.  Pain Management: Ice compress to the affected area 2-3x a day for 15-20 minutes as needed for pain management.    Follow-Up:  3-4 weeks for follow-up. New right hand x-rays next visit.     All of the patient's questions were answered and the patient will contact us if they have any questions or concerns in the interim.    Deanne Pisano PA-C  [facility]  Orthopedic Surgery    Medical Dictation software was used during the dictation of portions or the entirety  of this medical record.  Phonetic or grammatic errors may exist due to the use of this software. For clarification, refer to the author of the document.

## 2025-02-10 PROBLEM — Z74.09 IMPAIRED MOBILITY AND ACTIVITIES OF DAILY LIVING: Status: ACTIVE | Noted: 2025-02-10

## 2025-02-10 PROBLEM — Z78.9 IMPAIRED MOBILITY AND ACTIVITIES OF DAILY LIVING: Status: ACTIVE | Noted: 2025-02-10

## 2025-02-17 PROBLEM — Z78.9 IMPAIRED MOBILITY AND ACTIVITIES OF DAILY LIVING: Chronic | Status: ACTIVE | Noted: 2025-02-10

## 2025-02-17 PROBLEM — Z74.09 IMPAIRED MOBILITY AND ACTIVITIES OF DAILY LIVING: Chronic | Status: ACTIVE | Noted: 2025-02-10

## 2025-03-10 ENCOUNTER — TELEPHONE (OUTPATIENT)
Dept: ORTHOPEDICS | Facility: CLINIC | Age: 60
End: 2025-03-10

## 2025-03-10 ENCOUNTER — OFFICE VISIT (OUTPATIENT)
Dept: ORTHOPEDICS | Facility: CLINIC | Age: 60
End: 2025-03-10
Payer: COMMERCIAL

## 2025-03-10 DIAGNOSIS — S62.624D CLOSED DISPLACED FRACTURE OF MIDDLE PHALANX OF RIGHT RING FINGER WITH ROUTINE HEALING, SUBSEQUENT ENCOUNTER: Primary | ICD-10-CM

## 2025-03-10 PROCEDURE — 99999 PR PBB SHADOW E&M-EST. PATIENT-LVL III: CPT | Mod: PBBFAC,,,

## 2025-03-10 PROCEDURE — 99214 OFFICE O/P EST MOD 30 MIN: CPT | Mod: S$GLB,,,

## 2025-03-10 PROCEDURE — 1160F RVW MEDS BY RX/DR IN RCRD: CPT | Mod: CPTII,S$GLB,,

## 2025-03-10 PROCEDURE — 1159F MED LIST DOCD IN RCRD: CPT | Mod: CPTII,S$GLB,,

## 2025-03-10 NOTE — LETTER
March 10, 2025    [facility] Orthoped[facility]  [address]  [address]  [place] [address]  Phone: [phone]  Fax: [phone]       Patient: Matt Lomax  YOB: 1965  Date of Visit: 03/10/2025    To Whom It May Concern:    Elroy Lomax  was at [facility] on 03/10/2025. The patient may return to work/school on 03/11/2025 with no restrictions. If you have any questions or concerns, or if I can be of further assistance, please do not hesitate to contact me.    Sincerely,    Tamara Goyal MA

## 2025-03-10 NOTE — PROGRESS NOTES
Subjective:      Patient ID: Matt Lomax is a 59 y.o. female.    Chief Complaint: Injury of the Right Hand      HPI: Matt Lomax is a 59 y.o. right hand dominant female who presents to clinic for follow up of right ring finger middle phalanx fracture sustained approximately 3 months ago when she grabbed for her daughter after she took her keys. Patient went to Urgent Care on 12/22/2024 where x-rays confirmed fracture and patient was splinted. Patient was last seen by myself on 2/4/2025 for this. Patient has been minimally WBAT, states she has been babying the finger.  Associated symptoms include continued swelling of the digit.  Patient has been taking Ibuprofen as needed for pain. Pain today 0/10 pain. Denies any prior surgeries to hand. At baseline, patient is independent regarding ADLs.     Patient also continues to have ongoing right hand numbness and tingling for approximately 1 year. She reports a shooting pain from her right pinky finger to her elbow. Reports numbness and tingling on the whole right hand (both volar and dorsal sides), particularly affecting the ulnar side of the hand. Previous treatments include Meloxicam without much relief. Last visit I did explain to the patient it sounds like she may have some cubital tunnel.  We discussed possibility of proceeding with EMG, and future surgical intervention.  Patient states she is not interested in surgery, and will think about further diagnostic testing.    Occupation:     Ambulating: unassisted  Diabetic:  No  Smoking:  She has never smoked.  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    Type II or unspecified type diabetes mellitus without mention of complication, uncontrolled     Vitamin D deficiency  disease      MEDICATIONS:   Current Outpatient Medications:     hydrocortisone (CORTEF) 10 MG Tab, TAKE 1 TABLET BY MOUTH ONCE DAILY WITH BREAKFAST AND  ONE-HALF  TABLET  ONCE  DAILY  WITH  DINNER, Disp: 45 tablet, Rfl: 11    levothyroxine (SYNTHROID) 75 MCG tablet, TAKE 1 TABLET BY MOUTH BEFORE BREAKFAST, Disp: 90 tablet, Rfl: 3    LORazepam (ATIVAN) 0.5 MG tablet, TAKE 1 TABLET BY MOUTH EVERY 12 HOURS AS NEEDED FOR ANXIETY, Disp: 45 tablet, Rfl: 0    meloxicam (MOBIC) 7.5 MG tablet, Take 1 tablet (7.5 mg total) by mouth once daily., Disp: 14 tablet, Rfl: 1    multivitamin (THERAGRAN) per tablet, Take 1 tablet by mouth every morning. , Disp: , Rfl:     pravastatin (PRAVACHOL) 20 MG tablet, Take 1 tablet (20 mg total) by mouth once daily., Disp: 90 tablet, Rfl: 0    sertraline (ZOLOFT) 25 MG tablet, Take 1 tablet (25 mg total) by mouth once daily., Disp: 90 tablet, Rfl: 1    sodium chloride (SALINE NASAL) 0.65 % nasal spray, 2 sprays by Nasal route as needed for Congestion., Disp: 60 mL, Rfl: 12    triamterene-hydrochlorothiazide 37.5-25 mg (DYAZIDE) 37.5-25 mg per capsule, Take 1 capsule by mouth once daily., Disp: 90 capsule, Rfl: 0    verapamiL (CALAN-SR) 240 MG CR tablet, Take 1 tablet (240 mg total) by mouth once daily., Disp: 90 tablet, Rfl: 0    ALLERGIES:   Review of patient's allergies indicates:   Allergen Reactions    Lisinopril Hives       Review of Systems:  Constitution: Negative for chills, fever and night sweats.   HENT: Negative for congestion and headaches.    Eyes: Negative for blurred vision or vision loss.  Cardiovascular: Negative for chest pain and syncope.   Respiratory: Negative for cough and shortness of breath.    Endocrine: Negative for polydipsia, polyphagia and polyuria.   Hematologic/Lymphatic: Negative for bleeding problem. Does not bruise/bleed easily.   Skin: Negative for dry skin, itching and rash.   Musculoskeletal: See HPI.   Gastrointestinal: Negative for abdominal pain and  bowel incontinence.   Genitourinary: Negative for bladder incontinence and nocturia.   Neurological: Negative for disturbances in coordination, loss of balance and seizures.   Psychiatric/Behavioral: Negative for depression. The patient does not have insomnia.    Allergic/Immunologic: Negative for hives and persistent infections.          Objective:      There were no vitals filed for this visit.    PHYSICAL EXAM:  General: Alert & oriented x3, well-developed and well-nourished, in no acute distress, sitting comfortably in the exam room.  Skin: Warm and dry. Capillary refill less than 2 seconds.   Head: Normocephalic and atraumatic.   Eyes: Sclera appear normal.   Nose: No deformities seen.   Ears: No deformities seen.   Neck: No tracheal deviation present.   Pulmonary/Chest: Breathing unlabored.   Neurological: Alert and oriented to person, place, and time.   Psychiatric: Mood is pleasant and affect appropriate.     RIGHT HAND/WRIST:        Observation/Inspection:     Mild swelling diffusely throughout the 4th digit.          Palpation:   No tenderness to palpation to bony prominences and soft tissues throughout.        Range of Motion:  Wrist: Full to wrist flexion, extension, radial, and ulnar deviation without pain or difficulty..  Digits: Full to digit MCP and PIP flexion and extension without pain or difficulty.    Slightly limited to DIP flexion due to swelling.          Strength:    strength not tested today.         Neurovascular Exam:  Digits warm and well perfused, brisk capillary refill <3 seconds throughout  NVI motor/LTS to median, radial, and ulnar nerves, radial pulse 2+      Imaging:   X-Rays: 3 views of right hand obtained in the Orthopedic clinic today, and independently reviewed, show:  Healing fracture of the middle phalanx of the right 4th finger with no detrimental interval change.         Assessment:       1. Closed displaced fracture of middle phalanx of right ring finger with routine  healing, subsequent encounter        Plan:            I made the decision to obtain old records of the patient including previous notes and imaging. I independently reviewed and interpreted lab results today as well as prior imaging.     I explained the nature of the problem to the patient.     I discussed at length with the patient all the different treatment options available for her right ring finger including analgesics, anti-inflammatories, acetaminophen, bracing, rest, ice, heat, physical therapy, and corticosteroid injections. I explained the potential role of surgery in the treatment of this condition. The patient understands that if non-surgical measures do not adequately control symptoms, surgery will be considered in the future.     Discussed case and imaging with supervising physician, Dr. Mosqueda. Findings include:  Oblique healing fracture of middle phalanx of the 4th digit.     Dr. Mosqueda and I recommend:   Medications:  Continue  OTC Tylenol and/or NSAIDs  as needed for pain management.  Activity Modification:  Advance activities as tolerated.  Occupational Therapy:  Continue with previous referral to occupational therapy for hand and digit.  HEP:  Continue to perform HEP gentle ROM daily.  Pain Management: Ice compress to the affected area 2-3x a day for 15-20 minutes as needed for pain management.    Follow-Up: as needed.     All of the patient's questions were answered and the patient will contact us if they have any questions or concerns in the interim.    Deanne Pisano PA-C  [facility]  Orthopedic Surgery    Medical Dictation software was used during the dictation of portions or the entirety of this medical record.  Phonetic or grammatic errors may exist due to the use of this software. For clarification, refer to the author of the document.

## 2025-03-20 ENCOUNTER — OFFICE VISIT (OUTPATIENT)
Dept: PRIMARY CARE CLINIC | Facility: CLINIC | Age: 60
End: 2025-03-20
Payer: COMMERCIAL

## 2025-03-20 ENCOUNTER — LAB VISIT (OUTPATIENT)
Dept: LAB | Facility: HOSPITAL | Age: 60
End: 2025-03-20
Attending: NURSE PRACTITIONER
Payer: COMMERCIAL

## 2025-03-20 ENCOUNTER — PATIENT MESSAGE (OUTPATIENT)
Dept: PRIMARY CARE CLINIC | Facility: CLINIC | Age: 60
End: 2025-03-20

## 2025-03-20 VITALS
HEIGHT: 60 IN | OXYGEN SATURATION: 99 % | HEART RATE: 63 BPM | WEIGHT: 207.44 LBS | RESPIRATION RATE: 16 BRPM | DIASTOLIC BLOOD PRESSURE: 74 MMHG | BODY MASS INDEX: 40.72 KG/M2 | SYSTOLIC BLOOD PRESSURE: 128 MMHG

## 2025-03-20 DIAGNOSIS — E03.8 SECONDARY HYPOTHYROIDISM: ICD-10-CM

## 2025-03-20 DIAGNOSIS — Z78.9 IMPAIRED MOBILITY AND ACTIVITIES OF DAILY LIVING: Chronic | ICD-10-CM

## 2025-03-20 DIAGNOSIS — F51.01 PRIMARY INSOMNIA: ICD-10-CM

## 2025-03-20 DIAGNOSIS — G47.33 OSA ON CPAP: ICD-10-CM

## 2025-03-20 DIAGNOSIS — E78.2 MIXED HYPERLIPIDEMIA: ICD-10-CM

## 2025-03-20 DIAGNOSIS — E11.9 TYPE 2 DIABETES MELLITUS WITHOUT COMPLICATION, WITHOUT LONG-TERM CURRENT USE OF INSULIN: ICD-10-CM

## 2025-03-20 DIAGNOSIS — E27.49 SECONDARY ADRENAL INSUFFICIENCY: ICD-10-CM

## 2025-03-20 DIAGNOSIS — Z76.89 ENCOUNTER TO ESTABLISH CARE: ICD-10-CM

## 2025-03-20 DIAGNOSIS — Z12.4 SCREENING FOR CERVICAL CANCER: ICD-10-CM

## 2025-03-20 DIAGNOSIS — Z74.09 IMPAIRED MOBILITY AND ACTIVITIES OF DAILY LIVING: Chronic | ICD-10-CM

## 2025-03-20 DIAGNOSIS — E66.01 OBESITY, MORBID, BMI 40.0-49.9: ICD-10-CM

## 2025-03-20 DIAGNOSIS — D35.2 PITUITARY ADENOMA: ICD-10-CM

## 2025-03-20 DIAGNOSIS — Z76.89 ESTABLISHING CARE WITH NEW DOCTOR, ENCOUNTER FOR: Primary | ICD-10-CM

## 2025-03-20 DIAGNOSIS — I15.2 HYPERTENSION DUE TO ENDOCRINE DISORDER: ICD-10-CM

## 2025-03-20 LAB
ALBUMIN SERPL BCP-MCNC: 4.1 G/DL (ref 3.5–5.2)
ALBUMIN/CREAT UR: NORMAL UG/MG (ref 0–30)
ALP SERPL-CCNC: 85 U/L (ref 40–150)
ALT SERPL W/O P-5'-P-CCNC: 13 U/L (ref 10–44)
ANION GAP SERPL CALC-SCNC: 11 MMOL/L (ref 8–16)
AST SERPL-CCNC: 20 U/L (ref 10–40)
BASOPHILS # BLD AUTO: 0.08 K/UL (ref 0–0.2)
BASOPHILS NFR BLD: 1.3 % (ref 0–1.9)
BILIRUB SERPL-MCNC: 0.5 MG/DL (ref 0.1–1)
BUN SERPL-MCNC: 15 MG/DL (ref 6–20)
CALCIUM SERPL-MCNC: 9.9 MG/DL (ref 8.7–10.5)
CHLORIDE SERPL-SCNC: 103 MMOL/L (ref 95–110)
CHOLEST SERPL-MCNC: 224 MG/DL (ref 120–199)
CHOLEST/HDLC SERPL: 5.5 {RATIO} (ref 2–5)
CO2 SERPL-SCNC: 27 MMOL/L (ref 23–29)
CREAT SERPL-MCNC: 1 MG/DL (ref 0.5–1.4)
CREAT UR-MCNC: 69 MG/DL (ref 15–325)
DIFFERENTIAL METHOD BLD: ABNORMAL
EOSINOPHIL # BLD AUTO: 0.1 K/UL (ref 0–0.5)
EOSINOPHIL NFR BLD: 2.3 % (ref 0–8)
ERYTHROCYTE [DISTWIDTH] IN BLOOD BY AUTOMATED COUNT: 13.2 % (ref 11.5–14.5)
EST. GFR  (NO RACE VARIABLE): >60 ML/MIN/1.73 M^2
ESTIMATED AVG GLUCOSE: 114 MG/DL (ref 68–131)
GLUCOSE SERPL-MCNC: 92 MG/DL (ref 70–110)
HBA1C MFR BLD: 5.6 % (ref 4–5.6)
HCT VFR BLD AUTO: 45.7 % (ref 37–48.5)
HDLC SERPL-MCNC: 41 MG/DL (ref 40–75)
HDLC SERPL: 18.3 % (ref 20–50)
HGB BLD-MCNC: 14.7 G/DL (ref 12–16)
IMM GRANULOCYTES # BLD AUTO: 0.01 K/UL (ref 0–0.04)
IMM GRANULOCYTES NFR BLD AUTO: 0.2 % (ref 0–0.5)
LDLC SERPL CALC-MCNC: 147 MG/DL (ref 63–159)
LYMPHOCYTES # BLD AUTO: 3 K/UL (ref 1–4.8)
LYMPHOCYTES NFR BLD: 49.9 % (ref 18–48)
MCH RBC QN AUTO: 29.9 PG (ref 27–31)
MCHC RBC AUTO-ENTMCNC: 32.2 G/DL (ref 32–36)
MCV RBC AUTO: 93 FL (ref 82–98)
MICROALBUMIN UR DL<=1MG/L-MCNC: <5 UG/ML
MONOCYTES # BLD AUTO: 0.2 K/UL (ref 0.3–1)
MONOCYTES NFR BLD: 4 % (ref 4–15)
NEUTROPHILS # BLD AUTO: 2.5 K/UL (ref 1.8–7.7)
NEUTROPHILS NFR BLD: 42.3 % (ref 38–73)
NONHDLC SERPL-MCNC: 183 MG/DL
NRBC BLD-RTO: 0 /100 WBC
PLATELET # BLD AUTO: 326 K/UL (ref 150–450)
PMV BLD AUTO: 10.6 FL (ref 9.2–12.9)
POTASSIUM SERPL-SCNC: 4.7 MMOL/L (ref 3.5–5.1)
PROLACTIN SERPL IA-MCNC: 19 NG/ML (ref 5.2–26.5)
PROT SERPL-MCNC: 8.1 G/DL (ref 6–8.4)
RBC # BLD AUTO: 4.92 M/UL (ref 4–5.4)
SODIUM SERPL-SCNC: 141 MMOL/L (ref 136–145)
T4 FREE SERPL-MCNC: 0.93 NG/DL (ref 0.71–1.51)
TRIGL SERPL-MCNC: 180 MG/DL (ref 30–150)
TSH SERPL DL<=0.005 MIU/L-ACNC: <0.01 UIU/ML (ref 0.4–4)
WBC # BLD AUTO: 5.99 K/UL (ref 3.9–12.7)

## 2025-03-20 PROCEDURE — 3078F DIAST BP <80 MM HG: CPT | Mod: CPTII,S$GLB,, | Performed by: NURSE PRACTITIONER

## 2025-03-20 PROCEDURE — 99999 PR PBB SHADOW E&M-EST. PATIENT-LVL IV: CPT | Mod: PBBFAC,,, | Performed by: NURSE PRACTITIONER

## 2025-03-20 PROCEDURE — 84146 ASSAY OF PROLACTIN: CPT | Performed by: NURSE PRACTITIONER

## 2025-03-20 PROCEDURE — 85025 COMPLETE CBC W/AUTO DIFF WBC: CPT | Performed by: NURSE PRACTITIONER

## 2025-03-20 PROCEDURE — 84443 ASSAY THYROID STIM HORMONE: CPT | Performed by: NURSE PRACTITIONER

## 2025-03-20 PROCEDURE — 3008F BODY MASS INDEX DOCD: CPT | Mod: CPTII,S$GLB,, | Performed by: NURSE PRACTITIONER

## 2025-03-20 PROCEDURE — 3074F SYST BP LT 130 MM HG: CPT | Mod: CPTII,S$GLB,, | Performed by: NURSE PRACTITIONER

## 2025-03-20 PROCEDURE — 83036 HEMOGLOBIN GLYCOSYLATED A1C: CPT | Performed by: NURSE PRACTITIONER

## 2025-03-20 PROCEDURE — 80053 COMPREHEN METABOLIC PANEL: CPT | Performed by: NURSE PRACTITIONER

## 2025-03-20 PROCEDURE — 99214 OFFICE O/P EST MOD 30 MIN: CPT | Mod: S$GLB,,, | Performed by: NURSE PRACTITIONER

## 2025-03-20 PROCEDURE — 80061 LIPID PANEL: CPT | Performed by: NURSE PRACTITIONER

## 2025-03-20 PROCEDURE — 1160F RVW MEDS BY RX/DR IN RCRD: CPT | Mod: CPTII,S$GLB,, | Performed by: NURSE PRACTITIONER

## 2025-03-20 PROCEDURE — 82570 ASSAY OF URINE CREATININE: CPT | Performed by: NURSE PRACTITIONER

## 2025-03-20 PROCEDURE — 1159F MED LIST DOCD IN RCRD: CPT | Mod: CPTII,S$GLB,, | Performed by: NURSE PRACTITIONER

## 2025-03-20 PROCEDURE — 84439 ASSAY OF FREE THYROXINE: CPT | Performed by: NURSE PRACTITIONER

## 2025-03-20 NOTE — PROGRESS NOTES
[facility]    Chief Complaint      Chief Complaint   Patient presents with    \[facility]\"" Care    Annual Exam     History of Present Illness      Matt Lomax is a 59 y.o. female patient who presents today for [facility] care.   Continue care from Dr. CHAIDEZ   Last annual 7 /24     Labs- ordered   Eye exam- glasss   Gyn- ordered   Mammogram-7/24   Colonoscopy-2024     Endocrine-[facility]     Vaccines:   COVID x3   Flu up-to-date   Tdap 2016   Pneumonia 2016    History of Present Illness    CHIEF COMPLAINT:  Matt presents today for follow up    MEDICAL HISTORY:  She has a history of pituitary adenoma with partial surgical resection. Prolactin levels were last checked in 2023.    PSYCHOSOCIAL HISTORY:  She reports significant life stressors including the death of her ex- in late 2023 and the loss of her son in April/May.    MEDICATIONS:  She currently takes hydrocortisone (Cortef) and Verapamil for blood pressure management, and Ativan at lowest dose as needed for anxiety, particularly at night. She has discontinued Pravastatin and Sertraline (Zoloft).    DERMATOLOGIC:  She reports requiring medication for eczema.    PREVENTIVE CARE:  She is due for mammogram in July. Her colonoscopy is complete. She requires a new gynecologist due to previous provider's departure.      ROS:  Psychiatric: +anxiety, +inner restlessness, +nervousness         Health Maintenance   Topic Date Due    Shingles Vaccine (1 of 2) Never done    Pneumococcal Vaccines (Age 50+) (2 of 2 - PCV) 09/21/2017    Influenza Vaccine (1) 09/01/2024    COVID-19 Vaccine (4 - 2024-25 season) 09/01/2024    Hemoglobin A1c  01/16/2025    Diabetes Urine Screening  07/16/2025    Lipid Panel  07/16/2025    Mammogram  07/18/2025    Cervical Cancer Screening  11/28/2025    Low Dose Statin  03/20/2026    TETANUS VACCINE  06/04/2026    Colorectal Cancer Screening  05/07/2029    RSV Vaccine (Age 60+ and Pregnant patients) (1 - 1-dose 75+  series) 2041    Hepatitis C Screening  Completed    HIV Screening  Completed    Foot Exam  Discontinued    Diabetic Eye Exam  Discontinued       Past Medical History:   Diagnosis Date    HTN (hypertension)     Hyperprolactinemia     Morbid obesity     JUAN (obstructive sleep apnea)     Pituitary adenoma     Secondary hypothyroidism 2015    Type II or unspecified type diabetes mellitus without mention of complication, uncontrolled     Vitamin D deficiency disease        Past Surgical History:   Procedure Laterality Date    BRAIN SURGERY  2015    Breast Reduction      BREAST SURGERY  2010     SECTION      x 3    COLONOSCOPY N/A 2017    Procedure: COLONOSCOPY;  Surgeon: Ciro Rebolledo MD;  Location: [facility] ENDO ([address] [place] [address]);  Service: Endoscopy;  Laterality: N/A;    COLONOSCOPY N/A 2024    Procedure: COLONOSCOPY;  Surgeon: Gisela Jackson MD;  Location: [facility] ENDOSCOPY;  Service: Endoscopy;  Laterality: N/A;  referral: Dr. Schneider / inst. to patient portal. peg prep. Tbou   - pt resched to  due to illness SW  24- lvm and portal msg for pc. DBM  24- pt returned call, pc complete. DBM    TOTAL REDUCTION MAMMOPLASTY Bilateral     TRANSPHENOIDAL PITUITARY RESECTION  2014    TUBAL LIGATION         family history includes Diabetes in her maternal aunt and mother; Heart disease in her mother; Hypertension in her father and mother; No Known Problems in her brother, brother, daughter, daughter, maternal grandfather, maternal grandmother, maternal uncle, paternal aunt, paternal grandfather, paternal grandmother, paternal uncle, sister, and son.    Social History[1]    Encounter Medications[2]     Review of patient's allergies indicates:   Allergen Reactions    Lisinopril Hives       Physical Exam      Vital Signs  Pulse: 63  Resp: 16  SpO2: 99 %  BP: 128/74  BP Location: Right arm  Patient Position: Sitting  Height and Weight  Height: 5' (152.4 cm)  Weight: 94.1 kg  (207 lb 7.3 oz)  BSA (Calculated - sq m): 2 sq meters  BMI (Calculated): 40.5  Weight in (lb) to have BMI = 25: 127.7    Physical Exam  Vitals and nursing note reviewed.   Constitutional:       General: She is not in acute distress.     Appearance: Normal appearance. She is well-developed. She is not ill-appearing.   HENT:      Head: Normocephalic and atraumatic.      Right Ear: External ear normal.      Left Ear: External ear normal.   Eyes:      Conjunctiva/sclera: Conjunctivae normal.      Pupils: Pupils are equal, round, and reactive to light.   Neck:      Thyroid: No thyromegaly.      Vascular: No JVD.      Trachea: No tracheal deviation.   Cardiovascular:      Rate and Rhythm: Normal rate and regular rhythm.      Heart sounds: Normal heart sounds. No murmur heard.  Pulmonary:      Effort: Pulmonary effort is normal.      Breath sounds: Normal breath sounds.   Chest:      Chest wall: No tenderness.   Abdominal:      Palpations: Abdomen is soft.      Tenderness: There is no abdominal tenderness. There is no guarding.   Musculoskeletal:         General: Normal range of motion.      Cervical back: Normal range of motion and neck supple.   Lymphadenopathy:      Cervical: No cervical adenopathy.   Skin:     General: Skin is warm and dry.   Neurological:      General: No focal deficit present.      Mental Status: She is alert and oriented to person, place, and time.   Psychiatric:         Mood and Affect: Mood normal.         Behavior: Behavior normal.         Thought Content: Thought content normal.         Judgment: Judgment normal.          Laboratory:  CBC:  Lab Results   Component Value Date    WBC 5.90 07/16/2024    RBC 4.40 07/16/2024    HGB 13.8 07/16/2024    HCT 40.0 07/16/2024     07/16/2024    MCV 91 07/16/2024    MCH 31.4 (H) 07/16/2024    MCHC 34.5 07/16/2024    MCHC 32.9 07/18/2023    MCHC 33.2 03/31/2022     CMP:  Lab Results   Component Value Date    GLU 95 07/16/2024    CALCIUM 9.4 07/16/2024     ALBUMIN 3.7 07/16/2024    PROT 7.0 07/16/2024     07/16/2024    K 3.5 07/16/2024    CO2 26 07/16/2024     07/16/2024    BUN 10 07/16/2024    ALKPHOS 81 07/16/2024    ALT 13 07/16/2024    AST 25 07/16/2024    BILITOT 0.6 07/16/2024    BILITOT 0.5 05/31/2023    BILITOT 0.4 03/31/2022     URINALYSIS:  Lab Results   Component Value Date    COLORU Albertina (A) 01/27/2025    CLARITYU Slight Cloudy (A) 01/27/2025    SPECGRAV >=1.030 01/27/2025    PHUR 7.0 01/27/2025    PROTEINUA Negative 09/14/2018    BACTERIA Occasional 07/06/2015    NITRITE Negative 01/27/2025    LEUKOCYTESUR Negative 09/14/2018    UROBILINOGEN 4.0 (A) 01/27/2025    HYALINECASTS 47 (A) 09/02/2014    HYALINECASTS 0 08/05/2014    HYALINECASTS 1 07/31/2014      LIPIDS:  Lab Results   Component Value Date    TSH <0.010 (L) 03/31/2022    TSH <0.010 (L) 08/03/2020    TSH <0.010 (L) 06/21/2019    HDL 33 (L) 07/16/2024    HDL 34 (L) 07/18/2023    HDL 37 (L) 03/31/2022    CHOL 153 07/16/2024    CHOL 160 07/18/2023    CHOL 162 03/31/2022    TRIG 136 07/16/2024    TRIG 178 (H) 07/18/2023    TRIG 128 03/31/2022    LDLCALC 92.8 07/16/2024    LDLCALC 90.4 07/18/2023    LDLCALC 99.4 03/31/2022    CHOLHDL 21.6 07/16/2024    CHOLHDL 21.3 07/18/2023    CHOLHDL 22.8 03/31/2022    NONHDLCHOL 120 07/16/2024    NONHDLCHOL 126 07/18/2023    NONHDLCHOL 125 03/31/2022    TOTALCHOLEST 4.6 07/16/2024    TOTALCHOLEST 4.7 07/18/2023    TOTALCHOLEST 4.4 03/31/2022     TSH:  Lab Results   Component Value Date    TSH <0.010 (L) 03/31/2022    TSH <0.010 (L) 08/03/2020    TSH <0.010 (L) 06/21/2019     A1C:  Lab Results   Component Value Date    HGBA1C 5.5 07/16/2024    HGBA1C 5.5 01/17/2024    HGBA1C 5.7 (H) 05/31/2023    HGBA1C 5.7 (H) 10/11/2022    HGBA1C 5.9 (H) 03/31/2022    HGBA1C 5.5 11/03/2020    HGBA1C 5.4 08/03/2020    HGBA1C 5.9 (H) 03/11/2020    HGBA1C 5.7 (H) 06/21/2019    HGBA1C 5.5 09/14/2018    HGBA1C 5.3 12/29/2017    HGBA1C 5.4 11/27/2017          Assessment/Plan     Matt Lomax is a 59 y.o.female with:    Assessment & Plan    D35.2 Pituitary adenoma  E11.9 Type 2 diabetes mellitus without complication, without long-term current use of insulin  E27.40 Unspecified adrenocortical insufficiency  I10 Essential (primary) hypertension  F41.9 Anxiety disorder, unspecified  L30.0 Nummular dermatitis  Z63.4 Disappearance and death of family member    IMPRESSION:  - Checked prolactin level due to history of pituitary adenoma.  - Evaluated medication regimen, including discontinuation of pravastatin by patient.    PITUITARY ADENOMA:  - Ordered prolactin level test to monitor pituitary gland condition and assess if the adenoma is growing back.  - This test is scheduled as it has not been evaluated since 2023.    TYPE 2 DIABETES MELLITUS WITHOUT COMPLICATION, WITHOUT LONG-TERM CURRENT USE OF INSULIN:  - Ordered A1C as part of comprehensive lab panel to monitor blood sugar.    ADRENOCORTICAL INSUFFICIENCY:  - Continued hydrocortisone (Cortef) for adrenocortical insufficiency management.    HYPERTENSION:  - Continued verapamil for blood pressure management.    ANXIETY DISORDER:  - Initiated sertraline (Zoloft) at a very low dose, to be taken daily in the evening for anxiety management.  - Continued Ativan as needed for anxiety, emphasizing its use only when feeling particularly agitated.  - Discussed the addictive potential and proper usage guidelines of Ativan with the patient.    ECZEMA:  - Prescribed topical eczema cream, with specific formulation to be determined based on patient preference for tube or jar.  - Discussed cream options with the patient.    FAMILY MEMBER DEATH:  - Noted the patient's report of recent deaths of ex- and son.    GENERAL CARE AND FOLLOW-UP:  - Refilled all current medications, including hydrocortisone (Cortef) and verapamil, for a 1-year supply.  - Ordered comprehensive lab panel including CBC, metabolic panel,  liver function tests, renal function tests, urinalysis, and lipid panel.  - Educated on importance of regular GYN care and referred patient to gynecology, specifically requesting a female provider.  - Follow up in 6 months for repeat labs and annual exam.         Establishing care with new doctor, encounter for    Hypertension due to endocrine disorder  -     CBC Auto Differential; Future; Expected date: 03/20/2025  -     Comprehensive Metabolic Panel; Future; Expected date: 03/20/2025  -     Microalbumin/Creatinine Ratio, Urine; Future; Expected date: 03/20/2025  -     Hemoglobin A1C; Future; Expected date: 03/20/2025  -     PROLACTIN; Future; Expected date: 03/20/2025    JUAN on CPAP  -     CBC Auto Differential; Future; Expected date: 03/20/2025  -     Comprehensive Metabolic Panel; Future; Expected date: 03/20/2025  -     Microalbumin/Creatinine Ratio, Urine; Future; Expected date: 03/20/2025  -     Hemoglobin A1C; Future; Expected date: 03/20/2025  -     PROLACTIN; Future; Expected date: 03/20/2025    Obesity, morbid, BMI 40.0-49.9  -     CBC Auto Differential; Future; Expected date: 03/20/2025  -     Comprehensive Metabolic Panel; Future; Expected date: 03/20/2025  -     Microalbumin/Creatinine Ratio, Urine; Future; Expected date: 03/20/2025  -     Hemoglobin A1C; Future; Expected date: 03/20/2025  -     PROLACTIN; Future; Expected date: 03/20/2025    Pituitary adenoma  -     CBC Auto Differential; Future; Expected date: 03/20/2025  -     Comprehensive Metabolic Panel; Future; Expected date: 03/20/2025  -     Microalbumin/Creatinine Ratio, Urine; Future; Expected date: 03/20/2025  -     Hemoglobin A1C; Future; Expected date: 03/20/2025  -     PROLACTIN; Future; Expected date: 03/20/2025  -     TSH; Future; Expected date: 03/20/2025  -     T4, Free; Future; Expected date: 03/20/2025    Secondary adrenal insufficiency  -     CBC Auto Differential; Future; Expected date: 03/20/2025  -     Comprehensive Metabolic  Panel; Future; Expected date: 03/20/2025  -     Microalbumin/Creatinine Ratio, Urine; Future; Expected date: 03/20/2025  -     Hemoglobin A1C; Future; Expected date: 03/20/2025  -     PROLACTIN; Future; Expected date: 03/20/2025  -     TSH; Future; Expected date: 03/20/2025  -     T4, Free; Future; Expected date: 03/20/2025    Secondary hypothyroidism  -     CBC Auto Differential; Future; Expected date: 03/20/2025  -     Comprehensive Metabolic Panel; Future; Expected date: 03/20/2025  -     Microalbumin/Creatinine Ratio, Urine; Future; Expected date: 03/20/2025  -     Hemoglobin A1C; Future; Expected date: 03/20/2025  -     PROLACTIN; Future; Expected date: 03/20/2025  -     TSH; Future; Expected date: 03/20/2025  -     T4, Free; Future; Expected date: 03/20/2025    Mixed hyperlipidemia  -     CBC Auto Differential; Future; Expected date: 03/20/2025  -     Comprehensive Metabolic Panel; Future; Expected date: 03/20/2025  -     Microalbumin/Creatinine Ratio, Urine; Future; Expected date: 03/20/2025  -     Hemoglobin A1C; Future; Expected date: 03/20/2025  -     PROLACTIN; Future; Expected date: 03/20/2025  -     Lipid Panel; Future; Expected date: 03/20/2025    Primary insomnia  -     CBC Auto Differential; Future; Expected date: 03/20/2025  -     Comprehensive Metabolic Panel; Future; Expected date: 03/20/2025  -     Microalbumin/Creatinine Ratio, Urine; Future; Expected date: 03/20/2025  -     Hemoglobin A1C; Future; Expected date: 03/20/2025  -     PROLACTIN; Future; Expected date: 03/20/2025    Type 2 diabetes mellitus without complication, without long-term current use of insulin  -     CBC Auto Differential; Future; Expected date: 03/20/2025  -     Comprehensive Metabolic Panel; Future; Expected date: 03/20/2025  -     Microalbumin/Creatinine Ratio, Urine; Future; Expected date: 03/20/2025  -     Hemoglobin A1C; Future; Expected date: 03/20/2025  -     PROLACTIN; Future; Expected date: 03/20/2025    Impaired  mobility and activities of daily living  -     CBC Auto Differential; Future; Expected date: 03/20/2025  -     Comprehensive Metabolic Panel; Future; Expected date: 03/20/2025  -     Microalbumin/Creatinine Ratio, Urine; Future; Expected date: 03/20/2025  -     Hemoglobin A1C; Future; Expected date: 03/20/2025  -     PROLACTIN; Future; Expected date: 03/20/2025    Screening for cervical cancer  -     Ambulatory referral/consult to Gynecology; Future; Expected date: 03/20/2025          Health Maintenance Due   Topic Date Due    Shingles Vaccine (1 of 2) Never done    Pneumococcal Vaccines (Age 50+) (2 of 2 - PCV) 09/21/2017    Influenza Vaccine (1) 09/01/2024    COVID-19 Vaccine (4 - 2024-25 season) 09/01/2024    Hemoglobin A1c  01/16/2025        I spent 37 minutes on the day of this encounter for preparing for, evaluating, treating, and managing this patient.      -Continue current medications and maintain follow up with specialists.  Return to clinic in July for annual No follow-ups on file.     This note was generated with the assistance of ambient listening technology. Verbal consent was obtained by the patient and accompanying visitor(s) for the recording of patient appointment to facilitate this note. I attest to having reviewed and edited the generated note for accuracy, though some syntax or spelling errors may persist. Please contact the author of this note for any clarification.        CLINTON Boles  [facility] location                       [1]   Social History  Tobacco Use    Smoking status: Never     Passive exposure: Current    Smokeless tobacco: Never   Substance Use Topics    Alcohol use: No    Drug use: No   [2]   Outpatient Encounter Medications as of 3/20/2025   Medication Sig Note Dispense Refill    hydrocortisone (CORTEF) 10 MG Tab TAKE 1 TABLET BY MOUTH ONCE DAILY WITH BREAKFAST AND  ONE-HALF  TABLET  ONCE  DAILY  WITH  DINNER  45 tablet 11    levothyroxine (SYNTHROID) 75  MCG tablet TAKE 1 TABLET BY MOUTH BEFORE BREAKFAST  90 tablet 3    LORazepam (ATIVAN) 0.5 MG tablet TAKE 1 TABLET BY MOUTH EVERY 12 HOURS AS NEEDED FOR ANXIETY  45 tablet 0    multivitamin (THERAGRAN) per tablet Take 1 tablet by mouth every morning.  7/14/2014: Hold the morning of surgery.       pravastatin (PRAVACHOL) 20 MG tablet Take 1 tablet (20 mg total) by mouth once daily.  90 tablet 0    sertraline (ZOLOFT) 25 MG tablet Take 1 tablet (25 mg total) by mouth once daily.  90 tablet 1    sodium chloride (SALINE NASAL) 0.65 % nasal spray 2 sprays by Nasal route as needed for Congestion.  60 mL 12    triamterene-hydrochlorothiazide 37.5-25 mg (DYAZIDE) 37.5-25 mg per capsule Take 1 capsule by mouth once daily.  90 capsule 0    verapamiL (CALAN-SR) 240 MG CR tablet Take 1 tablet (240 mg total) by mouth once daily.  90 tablet 0    [DISCONTINUED] meloxicam (MOBIC) 7.5 MG tablet Take 1 tablet (7.5 mg total) by mouth once daily.  14 tablet 1     No facility-administered encounter medications on file as of 3/20/2025.

## 2025-03-21 NOTE — TELEPHONE ENCOUNTER
" LOV with Arlin Obrien NP , 3/20/2025  Your note from yesterday states"- Prescribed topical eczema cream, with specific formulation to be determined based on patient preference for tube or jar.  - Discussed cream options with the patient."    I do not see that a rx was written. Please advise  "

## 2025-03-27 ENCOUNTER — RESULTS FOLLOW-UP (OUTPATIENT)
Dept: PRIMARY CARE CLINIC | Facility: CLINIC | Age: 60
End: 2025-03-27

## 2025-03-28 ENCOUNTER — PATIENT MESSAGE (OUTPATIENT)
Dept: ENDOCRINOLOGY | Facility: CLINIC | Age: 60
End: 2025-03-28
Payer: COMMERCIAL

## 2025-03-28 DIAGNOSIS — E03.9 HYPOTHYROIDISM, UNSPECIFIED TYPE: ICD-10-CM

## 2025-03-28 RX ORDER — LEVOTHYROXINE SODIUM 75 UG/1
75 TABLET ORAL
Qty: 90 TABLET | Refills: 0 | Status: SHIPPED | OUTPATIENT
Start: 2025-03-28

## 2025-04-02 ENCOUNTER — OFFICE VISIT (OUTPATIENT)
Dept: ENDOCRINOLOGY | Facility: CLINIC | Age: 60
End: 2025-04-02
Payer: COMMERCIAL

## 2025-04-02 ENCOUNTER — PATIENT MESSAGE (OUTPATIENT)
Dept: ENDOCRINOLOGY | Facility: CLINIC | Age: 60
End: 2025-04-02

## 2025-04-02 DIAGNOSIS — Z86.39 H/O SECONDARY HYPOGONADISM: ICD-10-CM

## 2025-04-02 DIAGNOSIS — D35.2 PITUITARY ADENOMA: Primary | ICD-10-CM

## 2025-04-02 DIAGNOSIS — D35.2 PITUITARY ADENOMA: ICD-10-CM

## 2025-04-02 DIAGNOSIS — E22.1 HYPERPROLACTINEMIA: ICD-10-CM

## 2025-04-02 DIAGNOSIS — E03.9 HYPOTHYROIDISM, UNSPECIFIED TYPE: Primary | ICD-10-CM

## 2025-04-02 DIAGNOSIS — F43.21 GRIEF: ICD-10-CM

## 2025-04-02 DIAGNOSIS — E27.49 SECONDARY ADRENAL INSUFFICIENCY: ICD-10-CM

## 2025-04-02 DIAGNOSIS — E03.8 SECONDARY HYPOTHYROIDISM: ICD-10-CM

## 2025-04-02 PROCEDURE — 99213 OFFICE O/P EST LOW 20 MIN: CPT | Performed by: INTERNAL MEDICINE

## 2025-04-02 RX ORDER — LEVOTHYROXINE SODIUM 88 UG/1
88 TABLET ORAL
Qty: 30 TABLET | Refills: 11 | Status: SHIPPED | OUTPATIENT
Start: 2025-04-02 | End: 2026-04-02

## 2025-04-02 RX ORDER — HYDROCORTISONE 10 MG/1
TABLET ORAL
Qty: 45 TABLET | Refills: 11 | Status: SHIPPED | OUTPATIENT
Start: 2025-04-02

## 2025-04-02 NOTE — PROGRESS NOTES
Matt Lomax is a 59 y.o. female with HTN, HLD, JUAN presenting for follow-up of pituitary macroadenoma s/p resection, secondary AI, hypothyroidism    The patient location is: home in [place]  The chief complaint leading to consultation is:   Chief Complaint   Patient presents with    Hypothyroidism    Hypopituitarism    Adrenal Insufficiency       Visit type: audiovisual    Face to Face time with patient: 12 minutes  15 minutes of total time spent on the encounter, which includes face to face time and non-face to face time preparing to see the patient (eg, review of tests), Obtaining and/or reviewing separately obtained history, Documenting clinical information in the electronic or other health record, Independently interpreting results (not separately reported) and communicating results to the patient/family/caregiver, or Care coordination (not separately reported).    Each patient to whom he or she provides medical services by telemedicine is:  (1) informed of the relationship between the physician and patient and the respective role of any other health care provider with respect to management of the patient; and (2) notified that he or she may decline to receive medical services by telemedicine and may withdraw from such care at any time.      History of Present Illness  Diagnosed with macroadenoma, possible prolactinoma, ~ in 2006 and started on cabergoline at that time due to elevated prolactin (highest value I can find is 67). Suspect stalk effect from macroadenoma   Monitored with MRI with increase in size over time so she was referred to  and she had surgery in 7/2014.       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     Secondary adrenal insufficiency   HC 10-0-5-0  She denies any dizziness, nausea, vomiting, lightheadedness.   Sluggishness midday improved with adding afternoon  dose     Secondary hypothyroidism  On levothyroxine 75 mcg daily. Takes on empty stomach separate from other meds.    Some constipation and cold intolerance  Weight stable    Component      Latest Ref Rng 3/20/2025   Prolactin      5.2 - 26.5 ng/mL 19.0    Free T4      0.71 - 1.51 ng/dL 0.93         No cycles since day of surgery  Occasional hot flashes  DXA 5/2020 with normal BMD     Optometry visit 11/2018 - no retinopathy  Denies headache or vision change  Seen by [facility] 3/2021. To go back in 2023 with MRI but cost has been prohibitive    Last MRI 3/2021:   MR sella: No significant change from prior continued heterogeneous signal and expansion posterior sella wall with thin marginated enhancement.  While nonspecific remains concerning for residual lesion which may represent intraosseous meningioma with pituitary adenoma to be included in differential.  No evidence for new signal abnormality or increased sized lesion.    Denies headache, vision change     HbA1c improved and metformin d/c by PCP in summer 2017. Last A1c normal  Lab Results   Component Value Date    HGBA1C 5.6 03/20/2025        Has hypertension on dyazide and verapamil with stable blood pressure     Vit d insufficiency -D3 1000 units daily     Vit D, 25-Hydroxy   Date Value Ref Range Status   07/16/2024 25 (L) 30 - 96 ng/mL Final     Comment:     Vitamin D deficiency.........<10 ng/mL                              Vitamin D insufficiency......10-29 ng/mL       Vitamin D sufficiency........> or equal to 30 ng/mL  Vitamin D toxicity............>100 ng/mL             Current Outpatient Medications:     hydrocortisone (CORTEF) 10 MG Tab, TAKE 1 TABLET BY MOUTH ONCE DAILY WITH BREAKFAST AND  ONE-HALF  TABLET  ONCE  DAILY  WITH  DINNER, Disp: 45 tablet, Rfl: 11    levothyroxine (SYNTHROID) 88 MCG tablet, Take 1 tablet (88 mcg total) by mouth before breakfast., Disp: 30 tablet, Rfl: 11    LORazepam (ATIVAN) 0.5 MG tablet, TAKE 1 TABLET BY MOUTH EVERY 12 HOURS  AS NEEDED FOR ANXIETY, Disp: 45 tablet, Rfl: 0    multivitamin (THERAGRAN) per tablet, Take 1 tablet by mouth every morning. , Disp: , Rfl:     pravastatin (PRAVACHOL) 20 MG tablet, Take 1 tablet (20 mg total) by mouth once daily., Disp: 90 tablet, Rfl: 0    sertraline (ZOLOFT) 25 MG tablet, Take 1 tablet (25 mg total) by mouth once daily., Disp: 90 tablet, Rfl: 1    sodium chloride (SALINE NASAL) 0.65 % nasal spray, 2 sprays by Nasal route as needed for Congestion., Disp: 60 mL, Rfl: 12    triamterene-hydrochlorothiazide 37.5-25 mg (DYAZIDE) 37.5-25 mg per capsule, Take 1 capsule by mouth once daily., Disp: 90 capsule, Rfl: 0    verapamiL (CALAN-SR) 240 MG CR tablet, Take 1 tablet (240 mg total) by mouth once daily., Disp: 90 tablet, Rfl: 0    ROS as above    Objective:     There were no vitals filed for this visit.    Wt Readings from Last 3 Encounters:   03/20/25 1011 94.1 kg (207 lb 7.3 oz)   01/27/25 1230 95.3 kg (210 lb)   12/22/24 1335 95.4 kg (210 lb 5.1 oz)       There is no height or weight on file to calculate BMI.    LABS    Chemistry        Component Value Date/Time     03/20/2025 1101    K 4.7 03/20/2025 1101     03/20/2025 1101    CO2 27 03/20/2025 1101    BUN 15 03/20/2025 1101    CREATININE 1.0 03/20/2025 1101    GLU 92 03/20/2025 1101        Component Value Date/Time    CALCIUM 9.9 03/20/2025 1101    ALKPHOS 85 03/20/2025 1101    AST 20 03/20/2025 1101    ALT 13 03/20/2025 1101    BILITOT 0.5 03/20/2025 1101    ESTGFRAFRICA >60.0 03/31/2022 1121    EGFRNONAA >60.0 03/31/2022 1121        Lab Results   Component Value Date    HGBA1C 5.6 03/20/2025     Vit D, 25-Hydroxy   Date Value Ref Range Status   07/16/2024 25 (L) 30 - 96 ng/mL Final     Comment:     Vitamin D deficiency.........<10 ng/mL                              Vitamin D insufficiency......10-29 ng/mL       Vitamin D sufficiency........> or equal to 30 ng/mL  Vitamin D toxicity............>100 ng/mL         Assessment and Plan      Pituitary adenoma  MRI stable 2021, MRI ordered  Due to see Dr. Willis with imaging    Secondary adrenal insufficiency  On adequate replacement dose.  Cont HC 10/5 mg  aware of sick day rules        Secondary hypothyroidism  Increase levothyroxine to 88 mcg daily  We will repeat a lab in 4 weeks      H/O secondary hypogonadism  DXA normal 2020  Repeat age 65 or sooner if additional risk factors or clinical change      Referred to behavioral health for grief counseling      RTC 1 year    Trudy Qiu MD      Visit today included increased complexity associated with the care of the problems addressed and managing the longitudinal care of the patient due to the serious and/or complex managed problems

## 2025-04-02 NOTE — PATIENT INSTRUCTIONS
Increase levothyroxine to 88 mcg daily  We will repeat a lab in 4 weeks    It is best to take the levothyroxine in the morning on an empty stomach, and not eat, drink or take medications for at least ~30 minutes after taking it to maximize its absorption.  Please avoid taking any multi-vitamins (anything with iron) or calcium supplements for at least 4 hours after taking the medication.  If you miss the dose on one day, take two doses the next morning to make up for the missed dose      We will schedule MRI. Let me know if expensive and we can see about more affordable imaging somewhere else like [facility]    I would like to see you for a follow-up visit in one year which will be in March of 2026. My schedule for then will open at the beginning of November 2025 so please set yourself a reminder to schedule a visit when it opens. The spots fill quickly so please do this at the beginning of the month. You can reach out to us for help with scheduling or book yourself on the portal.

## 2025-04-07 ENCOUNTER — OFFICE VISIT (OUTPATIENT)
Dept: URGENT CARE | Facility: CLINIC | Age: 60
End: 2025-04-07
Payer: COMMERCIAL

## 2025-04-07 VITALS
RESPIRATION RATE: 18 BRPM | BODY MASS INDEX: 40.64 KG/M2 | OXYGEN SATURATION: 95 % | WEIGHT: 207 LBS | TEMPERATURE: 100 F | SYSTOLIC BLOOD PRESSURE: 116 MMHG | DIASTOLIC BLOOD PRESSURE: 79 MMHG | HEART RATE: 78 BPM | HEIGHT: 60 IN

## 2025-04-07 DIAGNOSIS — R05.9 COUGH, UNSPECIFIED TYPE: ICD-10-CM

## 2025-04-07 DIAGNOSIS — U07.1 COVID-19 VIRUS INFECTION: Primary | ICD-10-CM

## 2025-04-07 DIAGNOSIS — R09.81 SINUS CONGESTION: ICD-10-CM

## 2025-04-07 DIAGNOSIS — U07.1 COVID-19 VIRUS DETECTED: ICD-10-CM

## 2025-04-07 LAB
CTP QC/QA: YES
SARS CORONAVIRUS 2 ANTIGEN: POSITIVE

## 2025-04-07 PROCEDURE — 99214 OFFICE O/P EST MOD 30 MIN: CPT | Mod: S$GLB,,, | Performed by: NURSE PRACTITIONER

## 2025-04-07 PROCEDURE — 87811 SARS-COV-2 COVID19 W/OPTIC: CPT | Mod: QW,S$GLB,, | Performed by: NURSE PRACTITIONER

## 2025-04-07 RX ORDER — BENZONATATE 200 MG/1
200 CAPSULE ORAL 3 TIMES DAILY PRN
Qty: 30 CAPSULE | Refills: 0 | Status: SHIPPED | OUTPATIENT
Start: 2025-04-07 | End: 2025-04-17

## 2025-04-07 RX ORDER — PROMETHAZINE HYDROCHLORIDE AND DEXTROMETHORPHAN HYDROBROMIDE 6.25; 15 MG/5ML; MG/5ML
5 SYRUP ORAL NIGHTLY PRN
Qty: 120 ML | Refills: 0 | Status: SHIPPED | OUTPATIENT
Start: 2025-04-07 | End: 2025-04-17

## 2025-04-07 NOTE — PROGRESS NOTES
Subjective:      Patient ID: Matt Lomax is a 59 y.o. female.    Vitals:  height is 5' (1.524 m) and weight is 93.9 kg (207 lb). Her oral temperature is 99.9 °F (37.7 °C). Her blood pressure is 116/79 and her pulse is 78. Her respiration is 18 and oxygen saturation is 95%.     Chief Complaint: Sinus Problem    This is a 59 y.o. female who presents today with a chief complaint of   Sinus congestion, cough with mucus, back aches, and very fatigue. Symptoms started yesterday. Pt stayed in bed most of the day     Sinus Problem  This is a new problem. The current episode started yesterday. The problem has been gradually worsening since onset. There has been no fever. Her pain is at a severity of 6/10. The pain is moderate. Associated symptoms include congestion, coughing and sinus pressure. Pertinent negatives include no chills, diaphoresis, ear pain, headaches, hoarse voice, neck pain, shortness of breath, sneezing, sore throat or swollen glands. Past treatments include nothing. The treatment provided no relief.       Constitution: Positive for fatigue. Negative for chills, sweating and fever.   HENT:  Positive for congestion, sinus pressure and voice change. Negative for ear pain, sore throat and trouble swallowing.    Neck: Negative for neck pain.   Cardiovascular:  Negative for chest pain.   Respiratory:  Positive for cough and sputum production. Negative for chest tightness, shortness of breath and wheezing.    Allergic/Immunologic: Negative for sneezing.   Neurological:  Negative for headaches.      Objective:     Physical Exam   Constitutional: She is oriented to person, place, and time. She appears well-developed. She is cooperative.  Non-toxic appearance. She appears ill. No distress.   HENT:   Head: Normocephalic and atraumatic.   Ears:   Right Ear: Hearing, tympanic membrane, external ear and ear canal normal.   Left Ear: Hearing, tympanic membrane, external ear and ear canal normal.   Nose:  Nose normal. No mucosal edema, rhinorrhea or nasal deformity. No epistaxis. Right sinus exhibits no maxillary sinus tenderness and no frontal sinus tenderness. Left sinus exhibits no maxillary sinus tenderness and no frontal sinus tenderness.   Mouth/Throat: Uvula is midline, oropharynx is clear and moist and mucous membranes are normal. No trismus in the jaw. Normal dentition. No uvula swelling. No oropharyngeal exudate, posterior oropharyngeal edema or posterior oropharyngeal erythema.   Eyes: Conjunctivae and lids are normal. No scleral icterus.   Neck: Trachea normal and phonation normal. Neck supple. No edema present. No erythema present. No neck rigidity present.   Cardiovascular: Normal rate, regular rhythm, normal heart sounds and normal pulses.   Pulmonary/Chest: Effort normal and breath sounds normal. No respiratory distress. She has no decreased breath sounds. She has no wheezes. She has no rhonchi.   Abdominal: Normal appearance.   Musculoskeletal: Normal range of motion.         General: No deformity. Normal range of motion.   Neurological: She is alert and oriented to person, place, and time. She exhibits normal muscle tone. Coordination normal.   Skin: Skin is warm, dry, intact, not diaphoretic and not pale.   Psychiatric: Her speech is normal and behavior is normal. Judgment and thought content normal.   Nursing note and vitals reviewed.      Assessment:     1. COVID-19 virus infection    2. Sinus congestion    3. Cough, unspecified type        Plan:   Covid 19 risk score of 3  Paxlovid reviewed with patient.  Most recent labs reviewed.      COVID-19 virus infection  -     nirmatrelvir-ritonavir 300 mg (150 mg x 2)-100 mg copackaged tablets (EUA); Take 3 tablets by mouth 2 (two) times daily for 5 days. Each dose contains 2 nirmatrelvir (pink tablets) and 1 ritonavir (white tablet). Take all 3 tablets together  Dispense: 30 tablet; Refill: 0    Sinus congestion  -     SARS Coronavirus 2 Antigen, POCT  Manual Read    Cough, unspecified type  -     promethazine-dextromethorphan (PROMETHAZINE-DM) 6.25-15 mg/5 mL Syrp; Take 5 mLs by mouth nightly as needed (cough).  Dispense: 120 mL; Refill: 0  -     benzonatate (TESSALON) 200 MG capsule; Take 1 capsule (200 mg total) by mouth 3 (three) times daily as needed for Cough.  Dispense: 30 capsule; Refill: 0

## 2025-04-07 NOTE — LETTER
April 7, 2025      [facility]  [address]  [facility] [address]  Phone: [phone]  Fax: [phone]       Patient: Matt Lomax   YOB: 1965  Date of Visit: 04/07/2025    To Whom It May Concern:    Elroy Lomax  was at [facility] on 04/07/2025. The patient may return to work/school on 4/14/25 with no restrictions or sooner if 24 hours fever free and symptoms improved. If you have any questions or concerns, or if I can be of further assistance, please do not hesitate to contact me.    Sincerely,            Gertrude Rapp, NP

## 2025-04-16 DIAGNOSIS — E22.1 HYPERPROLACTINEMIA: ICD-10-CM

## 2025-04-16 RX ORDER — HYDROCORTISONE 10 MG/1
TABLET ORAL
Qty: 45 TABLET | Refills: 11 | Status: CANCELLED | OUTPATIENT
Start: 2025-04-16

## 2025-04-16 NOTE — TELEPHONE ENCOUNTER
Pt will get the rx from [facility] in [place] that it was sent to on 4/2. Please see additional question regarding missed days of cortef and advise

## 2025-04-21 ENCOUNTER — TELEPHONE (OUTPATIENT)
Dept: NEUROSURGERY | Facility: CLINIC | Age: 60
End: 2025-04-21
Payer: COMMERCIAL

## 2025-04-21 NOTE — TELEPHONE ENCOUNTER
----- Message from Jennifer sent at 4/17/2025  2:05 PM CDT -----  Regarding: appt  Contact: [phone]  Pt called in to schedule an appt; no available appts in Epic. Pt is asking for a call back soon to schedule. Thanks. Reason appt not schedule: no appt avail  Patient requesting call back or [facility] msg: Call Back

## 2025-04-23 ENCOUNTER — PATIENT MESSAGE (OUTPATIENT)
Dept: ENDOCRINOLOGY | Facility: CLINIC | Age: 60
End: 2025-04-23
Payer: COMMERCIAL

## 2025-05-01 ENCOUNTER — RESULTS FOLLOW-UP (OUTPATIENT)
Dept: ENDOCRINOLOGY | Facility: CLINIC | Age: 60
End: 2025-05-01

## 2025-05-16 NOTE — TELEPHONE ENCOUNTER
Patient called for results of Urine culture. Called in prescription of cipro to [facility] in [place]. Patient notified by staff   To get better and follow your care plan as instructed.

## 2025-05-28 ENCOUNTER — OFFICE VISIT (OUTPATIENT)
Dept: NEUROSURGERY | Facility: CLINIC | Age: 60
End: 2025-05-28
Payer: COMMERCIAL

## 2025-05-28 DIAGNOSIS — D35.2 PITUITARY ADENOMA: Primary | ICD-10-CM

## 2025-05-28 DIAGNOSIS — E03.8 SECONDARY HYPOTHYROIDISM: ICD-10-CM

## 2025-05-28 PROCEDURE — 99999 PR PBB SHADOW E&M-EST. PATIENT-LVL II: CPT | Mod: PBBFAC,,, | Performed by: PHYSICIAN ASSISTANT

## 2025-05-28 PROCEDURE — 1159F MED LIST DOCD IN RCRD: CPT | Mod: CPTII,S$GLB,, | Performed by: PHYSICIAN ASSISTANT

## 2025-05-28 PROCEDURE — 99204 OFFICE O/P NEW MOD 45 MIN: CPT | Mod: S$GLB,,, | Performed by: PHYSICIAN ASSISTANT

## 2025-05-28 PROCEDURE — 3044F HG A1C LEVEL LT 7.0%: CPT | Mod: CPTII,S$GLB,, | Performed by: PHYSICIAN ASSISTANT

## 2025-05-28 PROCEDURE — 3061F NEG MICROALBUMINURIA REV: CPT | Mod: CPTII,S$GLB,, | Performed by: PHYSICIAN ASSISTANT

## 2025-05-28 PROCEDURE — 3066F NEPHROPATHY DOC TX: CPT | Mod: CPTII,S$GLB,, | Performed by: PHYSICIAN ASSISTANT

## 2025-05-28 NOTE — PROGRESS NOTES
Neurosurgery  History & Physical    SUBJECTIVE:     Chief Complaint: pituitary adenoma     History of Present Illness:  Matt Lomax is a 59 y.o. female with history of pituitary macroadenoma s/p TSR with LD placement with Dr. Willis (2014) who presents today to re-establish care. She was last seen by Dr. Willis on 3/3/2021 and was doing well. She was supposed to follow up in 2 years with repeat MRI brain. Unfortunately, she this did not happen. She was referred back to NSGY by Dr. Qiu with endocrinology for continued surveillance. Prior to surgery, patient had mildly elevated prolactin (likely stalk effect). Prolactin levels have been normal since surgery. Dr. Qiu manages patient's hypothyroidism. She denies any visual deficits. Has not had HVF since .     Review of patient's allergies indicates:   Allergen Reactions    Lisinopril Hives       Current Medications[1]    Past Medical History:   Diagnosis Date    HTN (hypertension)     Hyperprolactinemia     Morbid obesity     JUAN (obstructive sleep apnea)     Pituitary adenoma     Secondary hypothyroidism 2015    Type II or unspecified type diabetes mellitus without mention of complication, uncontrolled     Vitamin D deficiency disease      Past Surgical History:   Procedure Laterality Date    BRAIN SURGERY  2015    Breast Reduction      BREAST SURGERY  2010     SECTION      x 3    COLONOSCOPY N/A 2017    Procedure: COLONOSCOPY;  Surgeon: Ciro Rebolledo MD;  Location: [address]);  Service: Endoscopy;  Laterality: N/A;    COLONOSCOPY N/A 2024    Procedure: COLONOSCOPY;  Surgeon: Gisela Jackson MD;  Location: [facility]COPY;  Service: Endoscopy;  Laterality: N/A;  referral: Dr. Schneider / inst. to patient portal. peg prep. Tbou   - pt resched to  due to illness SW  24- lvm and portal msg for pc. DBM  24- pt returned call, pc complete. DBM    TOTAL REDUCTION MAMMOPLASTY Bilateral      TRANSPHENOIDAL PITUITARY RESECTION  07/21/2014    TUBAL LIGATION       Family History       Problem Relation (Age of Onset)    Diabetes Mother, Maternal Aunt    Heart disease Mother    Hypertension Mother, Father    No Known Problems Brother, Daughter, Son, Brother, Daughter, Sister, Maternal Uncle, Paternal Aunt, Paternal Uncle, Maternal Grandmother, Maternal Grandfather, Paternal Grandmother, Paternal Grandfather          Social History     Socioeconomic History    Marital status:    Occupational History    Occupation:      Employer: [employer]   Tobacco Use    Smoking status: Never     Passive exposure: Current    Smokeless tobacco: Never   Substance and Sexual Activity    Alcohol use: No    Drug use: No    Sexual activity: Not Currently     Partners: Male     Birth control/protection: Post-menopausal     Social Drivers of Health     Financial Resource Strain: Low Risk  (4/2/2025)    Overall Financial Resource Strain (CARDIA)     Difficulty of Paying Living Expenses: Not hard at all   Food Insecurity: No Food Insecurity (4/2/2025)    Hunger Vital Sign     Worried About Running Out of Food in the Last Year: Never true     Ran Out of Food in the Last Year: Never true   Transportation Needs: No Transportation Needs (4/2/2025)    PRAPARE - Transportation     Lack of Transportation (Medical): No     Lack of Transportation (Non-Medical): No   Physical Activity: Insufficiently Active (4/2/2025)    Exercise Vital Sign     Days of Exercise per Week: 1 day     Minutes of Exercise per Session: 30 min   Stress: Stress Concern Present (4/2/2025)    [employer]ational [employer] - Occupational Stress Questionnaire     Feeling of Stress : Rather much   Housing Stability: High Risk (4/2/2025)    Housing Stability Vital Sign     Unable to Pay for Housing in the Last Year: Yes     Number of Times Moved in the Last Year: 0     Homeless in the Last Year: No       Review of  "Systems    OBJECTIVE:     Vital Signs     There is no height or weight on file to calculate BMI.      Neurosurgery Physical Exam  General: well developed, well nourished, no distress.   Head: normocephalic, atraumatic  Neurologic: Alert and oriented. Thought content appropriate.  Language: No aphasia  Speech: No dysarthria  Face symmetric; EOMI; wears prescription lenses   Pulmonary: normal respirations, no signs of respiratory distress  Motor Strength: Moves all extremities spontaneously with good tone.  Full strength upper and lower extremities. No abnormal movements seen.   Gait stable, fluid.       Diagnostic Results:  Myself and Dr. Willis have personally reviewed the imaging and agree with the findings. MRI pituitary w/wo contrast (4/21/25) via disc was uploaded to PACS. "Heterogeneous signal and heterogeneous enhancement within the sellar region centered at the midline measuring 11 x 9 x 10 mm. A rim of enhancement is also seen along the superior and posterior margins of the sella. The infundibulum is slightly displaced superiorly which was also described on the previous MRI. There appears to be mild mass effect on the optic chiasm which was also described on previous reports..Findings may be associated with residual pituitary adenoma versus intraosseous meningioma as has been described on prior imaging reports."     ASSESSMENT/PLAN:     59 y.o. female with history of pituitary macroadenoma s/p TSR with LD placement with Dr. Willis (7/21/2014) who presents today to re-establish care. She was last seen by Dr. Willis on 3/3/2021 and was doing well. She continues to report that she is doing well. Denies visual changes. Follows with Dr. Qiu with endocrinology for hypothyroid. Prolactin WNL. MRI with stable residual adneoma.     -She has not had HVF since 2015, recommend non-emergent referral to neuro-ophthalmology for HVF given some mild mass effect of residual adenoma on optic chiasm (stable from previous imaging). "   -Continue to follow up as scheduled with Dr. Qiu  -I would like the patient to follow-up with repeat MRI pituitary w/wo contrast in 2 years, sooner with any issues. I have encouraged her to contact the clinic with any questions, concerns, or adverse clinical changes. She verbalized understanding.     Kavitha Spring PA-C  Neurosurgery   [facility]    Time spent on this encounter: 47 minutes. This includes face-to-face time and non-face to face time preparing to see the patient (eg, review of tests), obtaining and/or reviewing separately obtained history, documenting clinical information in the electronic or other health record, independently interpreting results and communicating results to the patient/family/caregiver, or care coordinator       Note dictated with voice recognition software, please excuse any grammatical errors.           [1]   Current Outpatient Medications   Medication Sig Dispense Refill    hydrocortisone (CORTEF) 10 MG Tab TAKE 1 TABLET BY MOUTH ONCE DAILY WITH BREAKFAST AND  ONE-HALF  TABLET  ONCE  DAILY  WITH  DINNER 45 tablet 11    levothyroxine (SYNTHROID) 88 MCG tablet Take 1 tablet (88 mcg total) by mouth before breakfast. 30 tablet 11    LORazepam (ATIVAN) 0.5 MG tablet TAKE 1 TABLET BY MOUTH EVERY 12 HOURS AS NEEDED FOR ANXIETY 45 tablet 0    multivitamin (THERAGRAN) per tablet Take 1 tablet by mouth every morning.       pravastatin (PRAVACHOL) 20 MG tablet Take 1 tablet (20 mg total) by mouth once daily. 90 tablet 0    sertraline (ZOLOFT) 25 MG tablet Take 1 tablet (25 mg total) by mouth once daily. 90 tablet 1    sodium chloride (SALINE NASAL) 0.65 % nasal spray 2 sprays by Nasal route as needed for Congestion. 60 mL 12    triamterene-hydrochlorothiazide 37.5-25 mg (DYAZIDE) 37.5-25 mg per capsule Take 1 capsule by mouth once daily. 90 capsule 0    verapamiL (CALAN-SR) 240 MG CR tablet Take 1 tablet (240 mg total) by mouth once daily. 90 tablet 0     No  current facility-administered medications for this visit.

## 2025-05-29 ENCOUNTER — OFFICE VISIT (OUTPATIENT)
Dept: OBSTETRICS AND GYNECOLOGY | Facility: CLINIC | Age: 60
End: 2025-05-29
Payer: COMMERCIAL

## 2025-05-29 VITALS
HEIGHT: 60 IN | DIASTOLIC BLOOD PRESSURE: 80 MMHG | SYSTOLIC BLOOD PRESSURE: 128 MMHG | BODY MASS INDEX: 40.69 KG/M2 | WEIGHT: 207.25 LBS

## 2025-05-29 DIAGNOSIS — G47.00 INSOMNIA, UNSPECIFIED TYPE: ICD-10-CM

## 2025-05-29 DIAGNOSIS — Z12.4 SCREENING FOR CERVICAL CANCER: ICD-10-CM

## 2025-05-29 DIAGNOSIS — Z12.31 SCREENING MAMMOGRAM FOR BREAST CANCER: ICD-10-CM

## 2025-05-29 DIAGNOSIS — L70.9 ACNE, UNSPECIFIED ACNE TYPE: ICD-10-CM

## 2025-05-29 DIAGNOSIS — N89.8 VAGINAL DRYNESS: ICD-10-CM

## 2025-05-29 DIAGNOSIS — Z01.419 ENCOUNTER FOR WELL WOMAN EXAM WITH ROUTINE GYNECOLOGICAL EXAM: Primary | ICD-10-CM

## 2025-05-29 PROCEDURE — 99999 PR PBB SHADOW E&M-EST. PATIENT-LVL IV: CPT | Mod: PBBFAC,,,

## 2025-05-29 RX ORDER — CLINDAMYCIN PHOSPHATE 10 MG/G
GEL TOPICAL 2 TIMES DAILY
Qty: 30 G | Refills: 0 | Status: SHIPPED | OUTPATIENT
Start: 2025-05-29

## 2025-05-29 RX ORDER — ESTRADIOL 0.1 MG/G
CREAM VAGINAL
Qty: 42.5 G | Refills: 3 | Status: SHIPPED | OUTPATIENT
Start: 2025-05-29

## 2025-05-29 NOTE — PROGRESS NOTES
Chief Complaint: Well Woman Exam     HPI:      Matt Lomax is a 59 y.o.  who presents today for well woman exam.  She is new to me. Previously seen by Dr. Dalton and Melly. LMP: Patient's last menstrual period was 2014.  She is postmenopasual. Denies PMB. Today w/ c/o insomnia, vaginal dryness, and fishy odor. Denies hot flashes. Has some night sweats but they are manageable. No other issues, problems, or complaints. Specifically, patient denies abnormal vaginal bleeding, discharge, pelvic pain, urinary problems, or changes in appetite. Ms. Lomax is not currently sexually active. Declines STD testing today.    Previous Pap: NILM (2022)  HRT hx:  None  Previous Mammogram: BiRads: 1 T-C Score: 3.99% (2024)   Most Recent Dexa: Never had  Most Recent Colonoscopy: Benign polyps (2024), Repeat in  (7 years)    FH:  Breast cancer: none  Colon cancer: none  Ovarian cancer: none  Endometrial cancer: none    Ms. Lomax confirms that she wears her seatbelt when riding in the car and does not text while driving.     OB History          6    Para   6    Term   3            AB        Living   3         SAB        IAB        Ectopic        Multiple        Live Births   3                 ROS:     GENERAL: Denies unintentional weight gain or weight loss. Feeling well overall.   SKIN: Denies rash or lesions.   HEENT: Denies headaches, or vision changes.   CARDIOVASCULAR: Denies palpitations or chest pain.   RESPIRATORY: Denies shortness of breath or dyspnea on exertion.  BREASTS: Denies lumps or nipple discharge.   ABDOMEN: Denies constipation, diarrhea, nausea, vomiting, change in appetite.  URINARY: Denies frequency, dysuria.  NEUROLOGIC: Denies syncope or weakness.   PSYCHIATRIC: Denies uncontrolled depression or anxiety.    Physical Exam:      PHYSICAL EXAM:  /80   Ht 5' (1.524 m)   Wt 94 kg (207 lb 3.7 oz)   LMP 2014   BMI 40.47 kg/m²   Body  mass index is 40.47 kg/m².     APPEARANCE: Well nourished, well developed, in no acute distress.  PSYCH: Appropriate mood and affect.  SKIN: No acne or hirsutism  NECK: Neck symmetric without masses  NODES: No inguinal, axillary, or supraclavicular lymph node enlargement  ABDOMEN: Soft.  No tenderness or masses.    CARDIOVASCULAR: No edema of peripheral extremities  BREASTS: Symmetrical, well healed surgical scars (hx bilateral breast reduction). No palpable masses. No nipple discharge bilaterally.  PELVIC: Normal external genitalia without lesions.  Normal hair distribution.  Adequate perineal body, normal urethral meatus.  Vagina atrophic. Without lesions. Vagina with scant amount of discharge.  Cervix pink, without lesions, discharge or tenderness.  No significant cystocele or rectocele.  Bimanual exam shows uterus to be normal size, regular, mobile and nontender.  Adnexa without masses or tenderness.    Gyn note:  Difficult exam - atrophic, cervix very anterior.    A female chaperone was present for the breast and pelvic exam.     Assessment/Plan:     Encounter for well woman exam with routine gynecological exam  -     Counseled patient regarding healthy diet and regular exercise, daily multivitamin, daily seat belt use.   -     BP normotensive  -     She denies abuse and feels safe at home.  -     Pap smear:  Performed  -     MMG:  UTD (next due July - orders placed, pt to schedule)  -     Colon Cancer Screening: UTD    -     DEXA screening:  no indication due to age (<64y/o) and no additional risk factors e.g. previous fragility facture, glucocorticoid use, parental history of hip fracture, low body weight (<127 lbs), current cigarette use, excessive alcohol consumption, rheumatoid arthritis, secondary osteoporosis (malabsorption, inflammatory bowel disease, chronic liver disease).    Screening for cervical cancer  -     Ambulatory referral/consult to Gynecology  -     Liquid-Based Pap Smear,  Screening    Screening mammogram for breast cancer  -     Mammo Digital Screening Bilat w/ Pato (XPD); Future; Expected date: 05/29/2025    Vaginal dryness  -     Recommend Lubrigyn cleanser and lotion.  -     estradioL (ESTRACE) 0.01 % (0.1 mg/gram) vaginal cream; Rub pea sized amount to vaginal area nightly for first week of use and then twice weekly at night thereafter (ex: Monday/Thursday or Tuesday/Friday).  Dispense: 42.5 g; Refill: 3    Insomnia, unspecified type  -     Recommend magnesium glycinate 250-500 mg nightly and extended release melatonin.    Acne, unspecified acne type   -     Pimple/cyst in nasal area. Will rx clindamycin gel. F/u with derm if no improvement.  -     clindamycin phosphate 1% 1 % gel; Apply topically 2 (two) times daily.  Dispense: 30 g; Refill: 0    Follow up in about 1 year for annual exam or sooner PRN.    Counseling:     Patient was counseled today on current ASCCP pap guidelines, the recommendation for yearly physical exams, healthy diet and exercise routines, safe driving habits, and breast self awareness and annual mammograms. She is to see her PCP for other health maintenance.     Use of the [employer] Patient Portal discussed and encouraged during today's visit.   Counseling time: 15 minutes    Jie Stapleton (Maggie), MARIBETH  Obstetrics and Gynecology  [facility]

## 2025-05-30 ENCOUNTER — TELEPHONE (OUTPATIENT)
Dept: OPHTHALMOLOGY | Facility: CLINIC | Age: 60
End: 2025-05-30
Payer: COMMERCIAL

## 2025-05-30 ENCOUNTER — OFFICE VISIT (OUTPATIENT)
Dept: URGENT CARE | Facility: CLINIC | Age: 60
End: 2025-05-30
Payer: COMMERCIAL

## 2025-05-30 VITALS
SYSTOLIC BLOOD PRESSURE: 150 MMHG | HEART RATE: 55 BPM | BODY MASS INDEX: 40.64 KG/M2 | DIASTOLIC BLOOD PRESSURE: 80 MMHG | HEIGHT: 60 IN | OXYGEN SATURATION: 99 % | WEIGHT: 207 LBS | RESPIRATION RATE: 18 BRPM | TEMPERATURE: 98 F

## 2025-05-30 DIAGNOSIS — J34.89 NASAL VESTIBULITIS: Primary | ICD-10-CM

## 2025-05-30 DIAGNOSIS — H92.01 OTALGIA OF RIGHT EAR: ICD-10-CM

## 2025-05-30 DIAGNOSIS — I10 PRIMARY HYPERTENSION: ICD-10-CM

## 2025-05-30 DIAGNOSIS — D35.2 PITUITARY ADENOMA: Primary | ICD-10-CM

## 2025-05-30 DIAGNOSIS — R09.81 SINUS CONGESTION: ICD-10-CM

## 2025-05-30 RX ORDER — FLUCONAZOLE 150 MG/1
150 TABLET ORAL ONCE
Qty: 1 TABLET | Refills: 0 | Status: SHIPPED | OUTPATIENT
Start: 2025-05-30 | End: 2025-05-30

## 2025-05-30 RX ORDER — CEPHALEXIN 500 MG/1
500 CAPSULE ORAL EVERY 8 HOURS
Qty: 21 CAPSULE | Refills: 0 | Status: SHIPPED | OUTPATIENT
Start: 2025-05-30 | End: 2025-06-06

## 2025-05-30 RX ORDER — MUPIROCIN 20 MG/G
OINTMENT TOPICAL
Qty: 22 G | Refills: 1 | Status: SHIPPED | OUTPATIENT
Start: 2025-05-30

## 2025-05-30 NOTE — PROGRESS NOTES
Subjective:      Patient ID: Matt Lomax is a 59 y.o. female.    Vitals:  height is 5' (1.524 m) and weight is 93.9 kg (207 lb). Her oral temperature is 98.4 °F (36.9 °C). Her blood pressure is 150/80 (abnormal) and her pulse is 55 (abnormal). Her respiration is 18 and oxygen saturation is 99%.     Chief Complaint: Mass    59 yr old female came in with complaints of a swelling in her right nostril and ear pressure.  Also reports pressure over right sinus and right ear.  Her symptoms started last Saturday.  Patient denies fever, chills,  dizziness, nausea/vomiting.  Blood pressure is noted to be moderately elevated.  Patient is compliant with her blood pressure medicines.  Denies any chest pain, SOB, headache, weakness.    Mass  This is a new problem. The current episode started in the past 7 days. The problem occurs constantly. The problem has been gradually worsening. Pertinent negatives include no abdominal pain, anorexia, arthralgias, change in bowel habit, chest pain, chills, congestion, coughing, diaphoresis, fatigue, fever, headaches, joint swelling, myalgias, nausea, neck pain, numbness, rash, sore throat, swollen glands, urinary symptoms, vertigo, visual change, vomiting or weakness. Nothing aggravates the symptoms.     Constitution: Negative for chills, sweating, fatigue and fever.   HENT:  Negative for congestion and sore throat.    Neck: Negative for neck pain.   Cardiovascular:  Negative for chest pain.   Respiratory:  Negative for cough.    Gastrointestinal:  Negative for abdominal pain, nausea and vomiting.   Musculoskeletal:  Negative for joint pain, joint swelling and muscle ache.   Skin:  Negative for rash.   Neurological:  Negative for history of vertigo, headaches and numbness.      Objective:     Physical Exam   Constitutional: She is oriented to person, place, and time. She appears well-developed. She is cooperative.  Non-toxic appearance. She does not appear ill. No distress.    HENT:   Head: Normocephalic and atraumatic.   Ears:   Right Ear: Hearing, tympanic membrane, external ear and ear canal normal. no impacted cerumen  Left Ear: Hearing, tympanic membrane, external ear and ear canal normal. no impacted cerumen  Nose: Congestion present. No mucosal edema, rhinorrhea or nasal deformity. No epistaxis. Right sinus exhibits no maxillary sinus tenderness and no frontal sinus tenderness. Left sinus exhibits no maxillary sinus tenderness and no frontal sinus tenderness.      Comments:   Positive redness and mild swelling on the opening of right nostril, lateral aspect.  Positive localized tenderness.  Mouth/Throat: Uvula is midline, oropharynx is clear and moist and mucous membranes are normal. No trismus in the jaw. Normal dentition. No uvula swelling. No oropharyngeal exudate, posterior oropharyngeal edema or posterior oropharyngeal erythema.   Eyes: Conjunctivae and lids are normal. No scleral icterus.   Neck: Trachea normal and phonation normal. Neck supple. No edema present. No erythema present. No neck rigidity present.   Cardiovascular: Normal rate, regular rhythm, normal heart sounds and normal pulses.   Pulmonary/Chest: Effort normal and breath sounds normal. No respiratory distress. She has no decreased breath sounds. She has no rhonchi.   Abdominal: Normal appearance.   Musculoskeletal: Normal range of motion.         General: No deformity. Normal range of motion.   Neurological: She is alert and oriented to person, place, and time. She exhibits normal muscle tone. Coordination normal.   Skin: Skin is warm, dry, intact, not diaphoretic and not pale.   Psychiatric: Her speech is normal and behavior is normal. Judgment and thought content normal.   Nursing note and vitals reviewed.      Assessment:     1. Nasal vestibulitis    2. Sinus congestion    3. Otalgia of right ear    4. Primary hypertension        Plan:   Discussed exam findings/diagnosis/plan with patient. Advised to f/u  with PCP within 2-5 days. ER precautions given if symptoms get any worse. All questions answered. Patient verbally understood and agreed with treatment plan.  Educational materials and instructions regarding the visit diagnosis and management provided.     Nasal vestibulitis    Sinus congestion    Otalgia of right ear    Primary hypertension    Other orders  -     mupirocin (BACTROBAN) 2 % ointment; Apply to affected area 3 times daily  Dispense: 22 g; Refill: 1  -     cephALEXin (KEFLEX) 500 MG capsule; Take 1 capsule (500 mg total) by mouth every 8 (eight) hours. for 7 days  Dispense: 21 capsule; Refill: 0  -     fluconazole (DIFLUCAN) 150 MG Tab; Take 1 tablet (150 mg total) by mouth once. for 1 dose  Dispense: 1 tablet; Refill: 0

## 2025-06-03 ENCOUNTER — CLINICAL SUPPORT (OUTPATIENT)
Dept: OPHTHALMOLOGY | Facility: CLINIC | Age: 60
End: 2025-06-03
Payer: COMMERCIAL

## 2025-06-03 DIAGNOSIS — D35.2 PITUITARY ADENOMA: ICD-10-CM

## 2025-06-03 NOTE — PROGRESS NOTES
oct/hvf ou done/ou/rel/fix/coop.good/patient chart checked for allergies/od.-1.75 +1.25 x177/os.-2.00 +1.50 x5/ej.        Assessment /Plan     For exam results, see Encounter Report.    Pituitary adenoma  -     [place] Visual Field - OU - Extended - Both Eyes  -     OCT - Optic Nerve  -     Ambulatory referral/consult to Ophthalmology

## 2025-06-04 ENCOUNTER — RESULTS FOLLOW-UP (OUTPATIENT)
Dept: OBSTETRICS AND GYNECOLOGY | Facility: CLINIC | Age: 60
End: 2025-06-04

## 2025-07-10 ENCOUNTER — PATIENT OUTREACH (OUTPATIENT)
Dept: ADMINISTRATIVE | Facility: HOSPITAL | Age: 60
End: 2025-07-10
Payer: COMMERCIAL

## 2025-07-10 NOTE — PROGRESS NOTES
**PLEASE RECHECK BLOOD PRESSURE OR SCHEDULE NURSE VISIT IF NOT IN RANGE DURING VISIT.    HM and immunization's reviewed and updated.  Patient is due for Mammo( patient is scheduled).   Please help schedule or if already done please get information to get records.

## 2025-07-11 ENCOUNTER — PATIENT OUTREACH (OUTPATIENT)
Dept: ADMINISTRATIVE | Facility: HOSPITAL | Age: 60
End: 2025-07-11
Payer: COMMERCIAL

## 2025-07-11 ENCOUNTER — HOSPITAL ENCOUNTER (OUTPATIENT)
Dept: RADIOLOGY | Facility: HOSPITAL | Age: 60
Discharge: HOME | End: 2025-07-11
Attending: NURSE PRACTITIONER
Payer: COMMERCIAL

## 2025-07-11 ENCOUNTER — OFFICE VISIT (OUTPATIENT)
Dept: PRIMARY CARE CLINIC | Facility: CLINIC | Age: 60
End: 2025-07-11
Payer: COMMERCIAL

## 2025-07-11 VITALS
SYSTOLIC BLOOD PRESSURE: 140 MMHG | OXYGEN SATURATION: 99 % | HEART RATE: 55 BPM | WEIGHT: 210.56 LBS | BODY MASS INDEX: 41.34 KG/M2 | HEIGHT: 60 IN | DIASTOLIC BLOOD PRESSURE: 78 MMHG | RESPIRATION RATE: 16 BRPM

## 2025-07-11 DIAGNOSIS — F41.1 GENERALIZED ANXIETY DISORDER: ICD-10-CM

## 2025-07-11 DIAGNOSIS — M79.671 RIGHT FOOT PAIN: Primary | ICD-10-CM

## 2025-07-11 DIAGNOSIS — M79.672 LEFT FOOT PAIN: ICD-10-CM

## 2025-07-11 DIAGNOSIS — M79.671 RIGHT FOOT PAIN: ICD-10-CM

## 2025-07-11 PROCEDURE — 3078F DIAST BP <80 MM HG: CPT | Mod: CPTII,S$GLB,, | Performed by: NURSE PRACTITIONER

## 2025-07-11 PROCEDURE — 3044F HG A1C LEVEL LT 7.0%: CPT | Mod: CPTII,S$GLB,, | Performed by: NURSE PRACTITIONER

## 2025-07-11 PROCEDURE — 3077F SYST BP >= 140 MM HG: CPT | Mod: CPTII,S$GLB,, | Performed by: NURSE PRACTITIONER

## 2025-07-11 PROCEDURE — 1159F MED LIST DOCD IN RCRD: CPT | Mod: CPTII,S$GLB,, | Performed by: NURSE PRACTITIONER

## 2025-07-11 PROCEDURE — 73620 X-RAY EXAM OF FOOT: CPT | Mod: 26,LT,, | Performed by: RADIOLOGY

## 2025-07-11 PROCEDURE — 99214 OFFICE O/P EST MOD 30 MIN: CPT | Mod: S$GLB,,, | Performed by: NURSE PRACTITIONER

## 2025-07-11 PROCEDURE — 3008F BODY MASS INDEX DOCD: CPT | Mod: CPTII,S$GLB,, | Performed by: NURSE PRACTITIONER

## 2025-07-11 PROCEDURE — 73630 X-RAY EXAM OF FOOT: CPT | Mod: 26,RT,, | Performed by: RADIOLOGY

## 2025-07-11 PROCEDURE — 1160F RVW MEDS BY RX/DR IN RCRD: CPT | Mod: CPTII,S$GLB,, | Performed by: NURSE PRACTITIONER

## 2025-07-11 PROCEDURE — 73630 X-RAY EXAM OF FOOT: CPT | Mod: TC,RT

## 2025-07-11 PROCEDURE — 99999 PR PBB SHADOW E&M-EST. PATIENT-LVL IV: CPT | Mod: PBBFAC,,, | Performed by: NURSE PRACTITIONER

## 2025-07-11 PROCEDURE — 73620 X-RAY EXAM OF FOOT: CPT | Mod: TC,LT

## 2025-07-11 PROCEDURE — 3066F NEPHROPATHY DOC TX: CPT | Mod: CPTII,S$GLB,, | Performed by: NURSE PRACTITIONER

## 2025-07-11 PROCEDURE — 3061F NEG MICROALBUMINURIA REV: CPT | Mod: CPTII,S$GLB,, | Performed by: NURSE PRACTITIONER

## 2025-07-11 RX ORDER — SERTRALINE HYDROCHLORIDE 25 MG/1
25 TABLET, FILM COATED ORAL DAILY
Qty: 90 TABLET | Refills: 1 | Status: SHIPPED | OUTPATIENT
Start: 2025-07-11 | End: 2026-07-11

## 2025-07-11 NOTE — PROGRESS NOTES
[facility] Note    Chief Complaint      Chief Complaint   Patient presents with    Anxiety     History of Present Illness      Matt Lomax is a 59 y.o. female patient who presents today for anxiety.    History of Present Illness    CHIEF COMPLAINT:  Matt presents today with left foot pain.    HISTORY OF PRESENT ILLNESS:  She reports left foot pain located at the top of the foot that began after starting a new job requiring prolonged standing. She experiences stiffness and discomfort when getting up after sitting. She is flat-footed and currently wears [employer] shoes with cushions and arch support socks. She denies severe pain or inability to work and denies similar symptoms previously. No history of trauma or specific injury to the affected foot.    SURGICAL HISTORY:  She had right foot arch reconstruction performed by Dr. Beltran. The right foot now has an arch, while the left foot remains flat.    MENTAL HEALTH:  She previously used sertraline during a period of significant emotional distress following the deaths of her ex-partner and son. She requests to restart sertraline, which she previously used as needed for anxiety management.      ROS:  Musculoskeletal: +limb pain, +difficulty standing up  Skin: +excessive sweating  Psychiatric: +anxiety         Health Maintenance   Topic Date Due    Shingles Vaccine (1 of 2) Never done    Pneumococcal Vaccines (Age 50+) (2 of 2 - PCV) 09/21/2017    COVID-19 Vaccine (7 - 2024-25 season) 09/01/2024    Mammogram  07/18/2025    Influenza Vaccine (1) 09/01/2025    Hemoglobin A1c  09/20/2025    Diabetes Urine Screening  03/20/2026    Lipid Panel  03/20/2026    TETANUS VACCINE  06/04/2026    Low Dose Statin  07/11/2026    Colorectal Cancer Screening  05/07/2029    Cervical Cancer Screening  05/29/2030    RSV Vaccine (Age 60+ and Pregnant patients) (1 - 1-dose 75+ series) 12/12/2041    Hepatitis C Screening  Completed    HIV Screening   Completed    Foot Exam  Discontinued    Diabetic Eye Exam  Discontinued       Past Medical History:   Diagnosis Date    HTN (hypertension)     Hyperprolactinemia     Morbid obesity     JUAN (obstructive sleep apnea)     Pituitary adenoma     Secondary hypothyroidism 2015    Type II or unspecified type diabetes mellitus without mention of complication, uncontrolled     Vitamin D deficiency disease        Past Surgical History:   Procedure Laterality Date    BRAIN SURGERY  2015    Breast Reduction      BREAST SURGERY  2010     SECTION      x 3    COLONOSCOPY N/A 2017    Procedure: COLONOSCOPY;  Surgeon: Ciro Rebolledo MD;  Location: [facility] ENDO [address]);  Service: Endoscopy;  Laterality: N/A;    COLONOSCOPY N/A 2024    Procedure: COLONOSCOPY;  Surgeon: Gisela Jackson MD;  Location: [facility] ENDOSCOPY;  Service: Endoscopy;  Laterality: N/A;  referral: Dr. Schneider / inst. to patient portal. peg prep. Tbou   - pt resched to  due to illness SW  24- lvm and portal msg for pc. DBM  24- pt returned call, pc complete. DBM    TOTAL REDUCTION MAMMOPLASTY Bilateral     TRANSPHENOIDAL PITUITARY RESECTION  2014    TUBAL LIGATION         family history includes Diabetes in her maternal aunt and mother; Heart disease in her mother; Hypertension in her father and mother; No Known Problems in her brother, brother, daughter, daughter, maternal grandfather, maternal grandmother, maternal uncle, paternal aunt, paternal grandfather, paternal grandmother, paternal uncle, sister, and son.    Social History[1]    Encounter Medications[2]     Review of patient's allergies indicates:   Allergen Reactions    Lisinopril Hives       Physical Exam      Vital Signs  Pulse: (!) 55  Resp: 16  SpO2: 99 %  BP: (!) 140/78  BP Location: Right arm  Patient Position: Sitting  Height and Weight  Height: 5' (152.4 cm)  Weight: 95.5 kg (210 lb 8.6 oz)  BSA (Calculated - sq m): 2.01 sq meters  BMI  (Calculated): 41.1  Weight in (lb) to have BMI = 25: 127.7    Physical Exam  Vitals and nursing note reviewed.   Constitutional:       General: She is not in acute distress.     Appearance: Normal appearance. She is well-developed. She is not ill-appearing.   HENT:      Head: Normocephalic and atraumatic.      Right Ear: External ear normal.      Left Ear: External ear normal.   Eyes:      Conjunctiva/sclera: Conjunctivae normal.   Neck:      Thyroid: No thyromegaly.      Vascular: No JVD.      Trachea: No tracheal deviation.   Cardiovascular:      Rate and Rhythm: Normal rate and regular rhythm.      Heart sounds: Normal heart sounds. No murmur heard.  Pulmonary:      Effort: Pulmonary effort is normal.      Breath sounds: Normal breath sounds.   Chest:      Chest wall: No tenderness.   Abdominal:      Palpations: Abdomen is soft.      Tenderness: There is no abdominal tenderness. There is no guarding.   Musculoskeletal:         General: Normal range of motion.      Cervical back: Normal range of motion.   Lymphadenopathy:      Cervical: No cervical adenopathy.   Skin:     General: Skin is warm and dry.   Neurological:      General: No focal deficit present.      Mental Status: She is alert and oriented to person, place, and time.   Psychiatric:         Mood and Affect: Mood normal.         Behavior: Behavior normal.         Thought Content: Thought content normal.         Judgment: Judgment normal.          Laboratory:  CBC:  Lab Results   Component Value Date    WBC 5.99 03/20/2025    RBC 4.92 03/20/2025    HGB 14.7 03/20/2025    HCT 45.7 03/20/2025     03/20/2025    MCV 93 03/20/2025    MCH 29.9 03/20/2025    MCHC 32.2 03/20/2025    MCHC 34.5 07/16/2024    MCHC 32.9 07/18/2023     CMP:  Lab Results   Component Value Date    GLU 92 03/20/2025    CALCIUM 9.9 03/20/2025    ALBUMIN 4.1 03/20/2025    PROT 8.1 03/20/2025     03/20/2025    K 4.7 03/20/2025    CO2 27 03/20/2025     03/20/2025    BUN  15 03/20/2025    ALKPHOS 85 03/20/2025    ALT 13 03/20/2025    AST 20 03/20/2025    BILITOT 0.5 03/20/2025    BILITOT 0.6 07/16/2024    BILITOT 0.5 05/31/2023     URINALYSIS:  Lab Results   Component Value Date    COLORU Albertina (A) 01/27/2025    CLARITYU Slight Cloudy (A) 01/27/2025    SPECGRAV >=1.030 01/27/2025    PHUR 7.0 01/27/2025    PROTEINUA Negative 09/14/2018    BACTERIA Occasional 07/06/2015    NITRITE Negative 01/27/2025    LEUKOCYTESUR Negative 09/14/2018    UROBILINOGEN 4.0 (A) 01/27/2025    HYALINECASTS 47 (A) 09/02/2014    HYALINECASTS 0 08/05/2014    HYALINECASTS 1 07/31/2014      LIPIDS:  Lab Results   Component Value Date    TSH <0.010 (L) 03/20/2025    TSH <0.010 (L) 03/31/2022    TSH <0.010 (L) 08/03/2020    HDL 41 03/20/2025    HDL 33 (L) 07/16/2024    HDL 34 (L) 07/18/2023    CHOL 224 (H) 03/20/2025    CHOL 153 07/16/2024    CHOL 160 07/18/2023    TRIG 180 (H) 03/20/2025    TRIG 136 07/16/2024    TRIG 178 (H) 07/18/2023    LDLCALC 147.0 03/20/2025    LDLCALC 92.8 07/16/2024    LDLCALC 90.4 07/18/2023    CHOLHDL 18.3 (L) 03/20/2025    CHOLHDL 21.6 07/16/2024    CHOLHDL 21.3 07/18/2023    NONHDLCHOL 183 03/20/2025    NONHDLCHOL 120 07/16/2024    NONHDLCHOL 126 07/18/2023    TOTALCHOLEST 5.5 (H) 03/20/2025    TOTALCHOLEST 4.6 07/16/2024    TOTALCHOLEST 4.7 07/18/2023     TSH:  Lab Results   Component Value Date    TSH <0.010 (L) 03/20/2025    TSH <0.010 (L) 03/31/2022    TSH <0.010 (L) 08/03/2020     A1C:  Lab Results   Component Value Date    HGBA1C 5.6 03/20/2025    HGBA1C 5.5 07/16/2024    HGBA1C 5.5 01/17/2024    HGBA1C 5.7 (H) 05/31/2023    HGBA1C 5.7 (H) 10/11/2022    HGBA1C 5.9 (H) 03/31/2022    HGBA1C 5.5 11/03/2020    HGBA1C 5.4 08/03/2020    HGBA1C 5.9 (H) 03/11/2020    HGBA1C 5.7 (H) 06/21/2019    HGBA1C 5.5 09/14/2018    HGBA1C 5.3 12/29/2017         Assessment/Plan     Matt Lomax is a 59 y.o.female with:    Assessment & Plan      LEFT FOOT PAIN:  - Matt reports pain  in the left foot, especially at the top, possibly related to standing at a new job.  - Physical exam shows no visible issues with toes, though patient is aware of discomfort.  - Discussed potential causes including bone movement or spur formation.  - Ordered XR Left Foot and XR Right Foot to rule out bone spurs or structural changes, considering patient's history of foot surgery.  - Will consider referral to Dr. Kaur for further evaluation if needed.    CONGENITAL PES PLANUS (FLAT FOOT):  - Matt has flat feet, which may be contributing to the current foot pain.    ANXIETY DISORDER:  - Matt was previously on sertraline for anxiety related to deaths of family members and is experiencing significant emotional distress.            Right foot pain  -     X-Ray Foot Complete Right; Future; Expected date: 07/11/2025    Left foot pain  -     X-Ray Foot 2 View Left; Future; Expected date: 07/11/2025    Generalized anxiety disorder  -     sertraline (ZOLOFT) 25 MG tablet; Take 1 tablet (25 mg total) by mouth once daily.  Dispense: 90 tablet; Refill: 1          Health Maintenance Due   Topic Date Due    Shingles Vaccine (1 of 2) Never done    Pneumococcal Vaccines (Age 50+) (2 of 2 - PCV) 09/21/2017    COVID-19 Vaccine (7 - 2024-25 season) 09/01/2024    Mammogram  07/18/2025        I spent 34 minutes on the day of this encounter for preparing for, evaluating, treating, and managing this patient.      -Continue current medications and maintain follow up with specialists.  Return to clinic in Follow up if symptoms worsen or fail to improve.     This note was generated with the assistance of ambient listening technology. Verbal consent was obtained by the patient and accompanying visitor(s) for the recording of patient appointment to facilitate this note. I attest to having reviewed and edited the generated note for accuracy, though some syntax or spelling errors may persist. Please contact the author of this note for  any clarification.        CLINTON Boles  [facility] location                       [1]   Social History  Tobacco Use    Smoking status: Never     Passive exposure: Current    Smokeless tobacco: Never   Substance Use Topics    Alcohol use: No    Drug use: No   [2]   Outpatient Encounter Medications as of 7/11/2025   Medication Sig Note Dispense Refill    clindamycin phosphate 1% 1 % gel Apply topically 2 (two) times daily.  30 g 0    estradioL (ESTRACE) 0.01 % (0.1 mg/gram) vaginal cream Rub pea sized amount to vaginal area nightly for first week of use and then twice weekly at night thereafter (ex: Monday/Thursday or Tuesday/Friday).  42.5 g 3    hydrocortisone (CORTEF) 10 MG Tab TAKE 1 TABLET BY MOUTH ONCE DAILY WITH BREAKFAST AND  ONE-HALF  TABLET  ONCE  DAILY  WITH  DINNER  45 tablet 11    levothyroxine (SYNTHROID) 88 MCG tablet Take 1 tablet (88 mcg total) by mouth before breakfast.  30 tablet 11    LORazepam (ATIVAN) 0.5 MG tablet TAKE 1 TABLET BY MOUTH EVERY 12 HOURS AS NEEDED FOR ANXIETY  45 tablet 0    mupirocin (BACTROBAN) 2 % ointment Apply to affected area 3 times daily  22 g 1    pravastatin (PRAVACHOL) 20 MG tablet Take 1 tablet (20 mg total) by mouth once daily.  90 tablet 0    sodium chloride (SALINE NASAL) 0.65 % nasal spray 2 sprays by Nasal route as needed for Congestion.  60 mL 12    triamterene-hydrochlorothiazide 37.5-25 mg (DYAZIDE) 37.5-25 mg per capsule Take 1 capsule by mouth once daily.  90 capsule 0    verapamiL (CALAN-SR) 240 MG CR tablet Take 1 tablet (240 mg total) by mouth once daily.  90 tablet 0    multivitamin (THERAGRAN) per tablet Take 1 tablet by mouth every morning.  (Patient not taking: Reported on 7/11/2025) 7/14/2014: Hold the morning of surgery.       sertraline (ZOLOFT) 25 MG tablet Take 1 tablet (25 mg total) by mouth once daily.  90 tablet 1    [DISCONTINUED] sertraline (ZOLOFT) 25 MG tablet Take 1 tablet (25 mg total) by mouth once daily.  90  tablet 1     No facility-administered encounter medications on file as of 7/11/2025.

## 2025-07-21 ENCOUNTER — HOSPITAL ENCOUNTER (OUTPATIENT)
Dept: RADIOLOGY | Facility: HOSPITAL | Age: 60
Discharge: HOME | End: 2025-07-21
Payer: COMMERCIAL

## 2025-07-21 VITALS — BODY MASS INDEX: 41.23 KG/M2 | WEIGHT: 210 LBS | HEIGHT: 60 IN

## 2025-07-21 DIAGNOSIS — Z12.31 SCREENING MAMMOGRAM FOR BREAST CANCER: ICD-10-CM

## 2025-07-21 PROCEDURE — 77067 SCR MAMMO BI INCL CAD: CPT | Mod: TC

## 2025-08-09 ENCOUNTER — TELEPHONE (OUTPATIENT)
Dept: PRIMARY CARE CLINIC | Facility: CLINIC | Age: 60
End: 2025-08-09
Payer: COMMERCIAL

## 2025-08-20 ENCOUNTER — OCCUPATIONAL HEALTH (OUTPATIENT)
Dept: URGENT CARE | Facility: CLINIC | Age: 60
End: 2025-08-20

## 2025-08-20 ENCOUNTER — PATIENT MESSAGE (OUTPATIENT)
Dept: PRIMARY CARE CLINIC | Facility: CLINIC | Age: 60
End: 2025-08-20
Payer: COMMERCIAL

## 2025-08-20 DIAGNOSIS — Z02.89 ENCOUNTER FOR EXAMINATION REQUIRED BY DEPARTMENT OF TRANSPORTATION (DOT): Primary | ICD-10-CM
